# Patient Record
Sex: MALE | Race: WHITE | NOT HISPANIC OR LATINO | Employment: FULL TIME | URBAN - METROPOLITAN AREA
[De-identification: names, ages, dates, MRNs, and addresses within clinical notes are randomized per-mention and may not be internally consistent; named-entity substitution may affect disease eponyms.]

---

## 2017-01-12 RX ORDER — SIMVASTATIN 40 MG/1
TABLET, FILM COATED ORAL
Qty: 30 TABLET | Refills: 11 | Status: ON HOLD | OUTPATIENT
Start: 2017-01-12 | End: 2017-07-31 | Stop reason: HOSPADM

## 2017-01-24 RX ORDER — INSULIN ASPART 100 [IU]/ML
INJECTION, SOLUTION INTRAVENOUS; SUBCUTANEOUS
Qty: 10 VIAL | Refills: 3 | Status: SHIPPED | OUTPATIENT
Start: 2017-01-24 | End: 2017-06-05 | Stop reason: SDUPTHER

## 2017-04-06 ENCOUNTER — LAB VISIT (OUTPATIENT)
Dept: LAB | Facility: HOSPITAL | Age: 57
End: 2017-04-06
Attending: INTERNAL MEDICINE
Payer: COMMERCIAL

## 2017-04-06 ENCOUNTER — OFFICE VISIT (OUTPATIENT)
Dept: ENDOCRINOLOGY | Facility: CLINIC | Age: 57
End: 2017-04-06
Payer: COMMERCIAL

## 2017-04-06 VITALS
DIASTOLIC BLOOD PRESSURE: 70 MMHG | BODY MASS INDEX: 30.08 KG/M2 | SYSTOLIC BLOOD PRESSURE: 126 MMHG | HEIGHT: 70 IN | WEIGHT: 210.13 LBS | HEART RATE: 66 BPM

## 2017-04-06 DIAGNOSIS — E11.59 HYPERTENSION ASSOCIATED WITH DIABETES: ICD-10-CM

## 2017-04-06 DIAGNOSIS — E11.649 HYPOGLYCEMIA ASSOCIATED WITH DIABETES: ICD-10-CM

## 2017-04-06 DIAGNOSIS — H35.00 RETINOPATHY: ICD-10-CM

## 2017-04-06 DIAGNOSIS — E03.9 HYPOTHYROIDISM, UNSPECIFIED TYPE: ICD-10-CM

## 2017-04-06 DIAGNOSIS — E78.5 DYSLIPIDEMIA: ICD-10-CM

## 2017-04-06 DIAGNOSIS — I15.2 HYPERTENSION ASSOCIATED WITH DIABETES: ICD-10-CM

## 2017-04-06 LAB
ALBUMIN SERPL BCP-MCNC: 3.8 G/DL
ALP SERPL-CCNC: 108 U/L
ALT SERPL W/O P-5'-P-CCNC: 17 U/L
ANION GAP SERPL CALC-SCNC: 7 MMOL/L
AST SERPL-CCNC: 18 U/L
BILIRUB SERPL-MCNC: 1.4 MG/DL
BUN SERPL-MCNC: 19 MG/DL
CALCIUM SERPL-MCNC: 9 MG/DL
CHLORIDE SERPL-SCNC: 104 MMOL/L
CO2 SERPL-SCNC: 28 MMOL/L
CREAT SERPL-MCNC: 1 MG/DL
EST. GFR  (AFRICAN AMERICAN): >60 ML/MIN/1.73 M^2
EST. GFR  (NON AFRICAN AMERICAN): >60 ML/MIN/1.73 M^2
ESTIMATED AVG GLUCOSE: 169 MG/DL
GLUCOSE SERPL-MCNC: 67 MG/DL
HBA1C MFR BLD HPLC: 7.5 %
POTASSIUM SERPL-SCNC: 4.4 MMOL/L
PROT SERPL-MCNC: 6.9 G/DL
SODIUM SERPL-SCNC: 139 MMOL/L
T4 FREE SERPL-MCNC: 1 NG/DL
TSH SERPL DL<=0.005 MIU/L-ACNC: 11.98 UIU/ML

## 2017-04-06 PROCEDURE — 99999 PR PBB SHADOW E&M-EST. PATIENT-LVL III: CPT | Mod: PBBFAC,,, | Performed by: NURSE PRACTITIONER

## 2017-04-06 PROCEDURE — 80053 COMPREHEN METABOLIC PANEL: CPT

## 2017-04-06 PROCEDURE — 3045F PR MOST RECENT HEMOGLOBIN A1C LEVEL 7.0-9.0%: CPT | Mod: S$GLB,,, | Performed by: NURSE PRACTITIONER

## 2017-04-06 PROCEDURE — 84439 ASSAY OF FREE THYROXINE: CPT

## 2017-04-06 PROCEDURE — 36415 COLL VENOUS BLD VENIPUNCTURE: CPT

## 2017-04-06 PROCEDURE — 4010F ACE/ARB THERAPY RXD/TAKEN: CPT | Mod: S$GLB,,, | Performed by: NURSE PRACTITIONER

## 2017-04-06 PROCEDURE — 99215 OFFICE O/P EST HI 40 MIN: CPT | Mod: S$GLB,,, | Performed by: NURSE PRACTITIONER

## 2017-04-06 PROCEDURE — 3074F SYST BP LT 130 MM HG: CPT | Mod: S$GLB,,, | Performed by: NURSE PRACTITIONER

## 2017-04-06 PROCEDURE — 84443 ASSAY THYROID STIM HORMONE: CPT

## 2017-04-06 PROCEDURE — 1160F RVW MEDS BY RX/DR IN RCRD: CPT | Mod: S$GLB,,, | Performed by: NURSE PRACTITIONER

## 2017-04-06 PROCEDURE — 83036 HEMOGLOBIN GLYCOSYLATED A1C: CPT

## 2017-04-06 PROCEDURE — 3078F DIAST BP <80 MM HG: CPT | Mod: S$GLB,,, | Performed by: NURSE PRACTITIONER

## 2017-04-06 NOTE — PROGRESS NOTES
"CC: Patient is here for management of Type 1 diabetes and review of medical conditions as listed in visit diagnosis.      HPI: Mr. Owen Radford is a 56 y.o. White male who was diagnosed with Type 1  DM x 25 years ago, last seen by Dr. Sheffield in December 2016.   Pt is being seen by me for the first time.  Has medtronic pump, has used pump x 10 years. Also, uses sensor.  Recently made adjustments to basal settings r/t hypoglycemia in the afternoon.    CURRENT DIABETIC MEDS: novolog w/ insulin pump     Last Podiatry Exam: > 1 year    REVIEW OF SYSTEMS  General: no weakness, fatigue, or weight changes.   Eyes: no double or blurred vision, eye pain, or redness; last eye exam= 2016.   Cardiovascular: no chest pain, palpitations, edema, or murmurs.   Respiratory: no cough or dyspnea.   GI: no heartburn, nausea, or changes in bowel patterns; good appetite.   Skin: no rashes, dryness, itching, or reactions at insulin injection sites.   Neuro: no numbness, tingling, tremors, or vertigo.   Endocrine: no polyuria, polydipsia, polyphagia, heat or cold intolerance.     Vital Signs  /70 (BP Location: Left arm, Patient Position: Sitting, BP Method: Manual)  Pulse 66  Ht 5' 10" (1.778 m)  Wt 95.3 kg (210 lb 1.6 oz)  BMI 30.15 kg/m2    Hemoglobin A1C   Date Value Ref Range Status   11/29/2016 7.8 (H) 4.5 - 6.2 % Final     Comment:     According to ADA guidelines, hemoglobin A1C <7.0% represents  optimal control in non-pregnant diabetic patients.  Different  metrics may apply to specific populations.   Standards of Medical Care in Diabetes - 2016.  For the purpose of screening for the presence of diabetes:  <5.7%     Consistent with the absence of diabetes  5.7-6.4%  Consistent with increasing risk for diabetes   (prediabetes)  >or=6.5%  Consistent with diabetes  Currently no consensus exists for use of hemoglobin A1C  for diagnosis of diabetes for children.     07/26/2016 7.6 (H) 4.5 - 6.2 % Final     Comment:     " According to ADA guidelines, hemoglobin A1C <7.0% represents  optimal control in non-pregnant diabetic patients.  Different  metrics may apply to specific populations.   Standards of Medical Care in Diabetes - 2016.  For the purpose of screening for the presence of diabetes:  <5.7%     Consistent with the absence of diabetes  5.7-6.4%  Consistent with increasing risk for diabetes   (prediabetes)  >or=6.5%  Consistent with diabetes  Currently no consensus exists for use of hemoglobin A1C  for diagnosis of diabetes for children.     05/15/2007 7.6 (H) 4.5 - 6.2 % Final      Lab Results   Component Value Date    CREATININE 1.0 11/29/2016      Lab Results   Component Value Date    TSH 3.120 11/29/2016      Lab Results   Component Value Date    LDLCALC 99.4 07/26/2016        PHYSICAL EXAMINATION  Constitutional: Appears well, no distress  Neck: Supple, trachea midline.   Respiratory: CTA without wheezes, even and unlabored.  Cardiovascular: RRR; no carotid bruits or murmurs.   Lymph: DP pulses  2+ bilaterally; no edema.   Skin: warm and dry; no injection site reactions, no acanthosis nigracans observed.  Neuro:patient alert and cooperative, normal affect.    Diabetes Foot Exam:   defer    Assessment/Plan    1. DM (diabetes mellitus), type 1, uncontrolled with complications -  DM uncontrolled  Awaiting a1c-in process  Reviewed tdd and download.  Continue basal: mn-0630 1 u/hr, 0630 2.45 u/hr, 1300 1.7 u/hr, 1900 1.4 u/hr  Change icr 1:9,  Continue isf 50  Target   iob 4 hours  Bg ac/hs  Counseling >35 mins/interpretation     2. Hypertension associated with diabetes - stable, continue med(s)   3. Hypoglycemia associated with diabetes - reviewed, basal settings recently adjusted (lows during the afternoon)-less now   4. Hypothyroidism, unspecified type -continue levothyroxine 200 mcg -repeat, tsh next time, may not take on empty stomach with water every morning.  Lab Results   Component Value Date    TSH 11.983 (H)  04/06/2017        5. Retinopathy -f/u with ophthalmology/retinal specialist   6. Dyslipidemia -  Lab Results   Component Value Date    LDLCALC 99.4 07/26/2016     At goal. Continue statin        FOLLOW UP  Return in about 3 months (around 7/6/2017).     Orders Placed This Encounter   Procedures    Hemoglobin A1c     Standing Status:   Future     Standing Expiration Date:   6/5/2018

## 2017-04-06 NOTE — PATIENT INSTRUCTIONS
Snacks can be an important part of a balanced, healthy meal plan. They allow you to eat more frequently, feeling full and satisfied throughout the day. Also, they allow you to spread carbohydrates evenly, which may stabilize blood sugars.  Plus, snacks are enjoyable!     The amount of carbohydrate needed at snacks varies. Generally, about 15-30 grams of carbohydrate per snack is recommended.  Below you will find some tasty treats.       0-5 gm carb   Crystal Light   Vitamin Water Zero   Herbal tea, unsweetened   2 tsp peanut butter on celery   1./2 cup sugar-free jell-o   1 sugar-free popsicle   ¼ cup blueberries   8oz Blue Swati unsweetened almond milk   5 baby carrots & celery sticks, cucumbers, bell peppers dipped in ¼ cup salsa, 2Tbsp light ranch dressing or 2Tbsp plain Greek yogurt   10 Goldfish crackers   ½ oz low-fat cheese or string cheese   1 closed handful of nuts, unsalted   1 Tbsp of sunflower seeds, unsalted   1 cup Smart Pop popcorn   1 whole grain brown rice cake        15 gm carb   1 small piece of fruit or ½ banana or 1/2 cup lite canned fruit   3 melodie cracker squares   3 cups Smart Pop popcorn, top spray butter, Rudd lite salt or cinnamon and Truvia   5 Vanilla Wafers   ½ cup low fat, no added sugar ice cream or frozen yogurt (Blue bell, Blue Bunny, Weight Watchers, Skinny Cow)   ½ turkey, ham, or chicken sandwich   ½ c fruit with ½ c Cottage cheese   4-6 unsalted wheat crackers with 1 oz low fat cheese or 1 tbsp peanut butter    30-45 goldfish crackers (depending on flavor)    7-8 Scientologist mini brown rice cakes (caramel, apple cinnamon, chocolate)    12 Scientologist mini brown rice cakes (cheddar, bbq, ranch)    1/3 cup hummus dip with raw veg   1/2 whole wheat divya, 1Tbsp hummus   Mini Pizza (1/2 whole wheat English muffin, low-fat  cheese, tomato sauce)   100 calorie snack pack (Oreo, Chips Ahoy, Ritz Mix, Baked Cheetos)   4-6 oz. light or Greek Style yogurt  (Rafael Solomon, [place], [facility])   ½ cup sugar-free pudding     6 in. wheat tortilla or divya oven toasted chips (topped with spray butter flavoring, cinnamon, Truvia OR spray butter, garlic powder, chili powder)    18 BBQ Popchips (available at Target, Whole Foods, Fresh Market)                   Diabetes Support Group Meetings    Date Topics   February 9 Health Promotion/Nutrition   March 9 Taking Care of Your Kidneys   April 13 Taking Care of Your Feet   May 11 Ease Your Mind with Diabetes   June 8 Hurricane and Emergency Preparedness   July 13 Family & Caregiver Support for Diabetes   *August 17  Taking Care of Your Eyes   September 14 Devices & Technology   October 12 Recipes & Treats   November 9 Getting Pumped Up for Diabetes   December 14 Year-End Close Out            Meetings are held in the [facility] (A) of the [facility] located at [address] [place] [address]. Please call [phone] for additional information.    Free service, offered every 2nd Thursday of every month, except in August! Family members and/or friends are welcome as well!  Support group is for patients with type 1 or type 2 diabetes.    From 3:30p to 4:30p

## 2017-05-05 ENCOUNTER — PATIENT MESSAGE (OUTPATIENT)
Dept: ENDOCRINOLOGY | Facility: CLINIC | Age: 57
End: 2017-05-05

## 2017-05-07 DIAGNOSIS — Z12.11 ENCOUNTER FOR SCREENING COLONOSCOPY: Primary | ICD-10-CM

## 2017-05-15 ENCOUNTER — PATIENT MESSAGE (OUTPATIENT)
Dept: ENDOCRINOLOGY | Facility: CLINIC | Age: 57
End: 2017-05-15

## 2017-06-05 RX ORDER — INSULIN ASPART 100 [IU]/ML
INJECTION, SOLUTION INTRAVENOUS; SUBCUTANEOUS
Qty: 3 VIAL | Refills: 6 | Status: SHIPPED | OUTPATIENT
Start: 2017-06-05 | End: 2018-05-08 | Stop reason: SDUPTHER

## 2017-06-22 ENCOUNTER — OFFICE VISIT (OUTPATIENT)
Dept: GASTROENTEROLOGY | Facility: CLINIC | Age: 57
End: 2017-06-22
Payer: COMMERCIAL

## 2017-06-22 ENCOUNTER — TELEPHONE (OUTPATIENT)
Dept: ENDOSCOPY | Facility: HOSPITAL | Age: 57
End: 2017-06-22

## 2017-06-22 VITALS
HEART RATE: 67 BPM | WEIGHT: 205.94 LBS | DIASTOLIC BLOOD PRESSURE: 70 MMHG | BODY MASS INDEX: 29.48 KG/M2 | HEIGHT: 70 IN | SYSTOLIC BLOOD PRESSURE: 121 MMHG

## 2017-06-22 DIAGNOSIS — Z12.11 COLON CANCER SCREENING: Primary | ICD-10-CM

## 2017-06-22 DIAGNOSIS — Z12.11 SPECIAL SCREENING FOR MALIGNANT NEOPLASMS, COLON: Primary | ICD-10-CM

## 2017-06-22 PROCEDURE — 99999 PR PBB SHADOW E&M-EST. PATIENT-LVL III: CPT | Mod: PBBFAC,,, | Performed by: PHYSICIAN ASSISTANT

## 2017-06-22 PROCEDURE — 99499 UNLISTED E&M SERVICE: CPT | Mod: S$GLB,,, | Performed by: PHYSICIAN ASSISTANT

## 2017-06-22 RX ORDER — POLYETHYLENE GLYCOL 3350, SODIUM SULFATE ANHYDROUS, SODIUM BICARBONATE, SODIUM CHLORIDE, POTASSIUM CHLORIDE 236; 22.74; 6.74; 5.86; 2.97 G/4L; G/4L; G/4L; G/4L; G/4L
4 POWDER, FOR SOLUTION ORAL ONCE
Qty: 4000 ML | Refills: 0 | Status: SHIPPED | OUTPATIENT
Start: 2017-06-22 | End: 2017-06-22

## 2017-06-22 NOTE — PROGRESS NOTES
[facility] Consultation Note    Reason for Consult:  The encounter diagnosis was Colon cancer screening.    PCP:   Ronald Segura   [address] / [place] [address]    Referring MD:  Nikki Sheffield Md  [address] [place] [address]    HPI:  This is a 56 y.o. male here to schedule a screening colonoscopy    Today he has no complaints. He denies abdominal pain, nausea, vomiting, GERD, dysphagia, constipation, diarrhea, BRBPR, or melena.    No prior colonoscopy    ROS:  Constitutional: No fevers, chills, No weight loss  ENT: No allergies  CV: No chest pain  Pulm: No cough, No shortness of breath  Ophtho: No vision changes  GI: see HPI  Derm: No rash  Heme: No lymphadenopathy, No bruising  MSK: No arthritis  : No dysuria, No hematuria  Endo: No hot or cold intolerance  Neuro: No syncope, No seizure  Psych: No anxiety, No depression    Medical History:  has a past medical history of Diabetes mellitus; Fatigue; and Sleep apnea.    Surgical History:  has a past surgical history that includes Knee arthroscopy w/ meniscal repair.    Family History: family history includes Cancer in his father..     Social History:  reports that he quit smoking about 8 years ago. He does not have any smokeless tobacco history on file. He reports that he drinks alcohol. He reports that he does not use drugs.    Review of patient's allergies indicates:  No Known Allergies    Current Outpatient Prescriptions on File Prior to Visit   Medication Sig Dispense Refill    atorvastatin (LIPITOR) 20 MG tablet Take 20 mg by mouth once daily.        insulin aspart (NOVOLOG) 100 unit/mL injection SUBCUTANEOUS 90u q day per insulin pump 3 vial 6    levothyroxine (SYNTHROID) 200 MCG tablet Take 200 mcg by mouth once daily.      lisinopril 10 MG tablet take 1 tablet by mouth once daily 30 tablet 11    simvastatin (ZOCOR) 40 MG tablet take 1 tablet by mouth once daily 30 tablet 11     No current  "facility-administered medications on file prior to visit.          Objective Findings:    Vital Signs:  /70   Pulse 67   Ht 5' 10" (1.778 m)   Wt 93.4 kg (205 lb 14.6 oz)   BMI 29.54 kg/m²   Body mass index is 29.54 kg/m².    Physical Exam:  General Appearance: Well appearing in no acute distress  Head:   Normocephalic, without obvious abnormality  Eyes:    No scleral icterus  ENT: Neck supple, Lips, mucosa, and tongue normal  Lungs: CTA bilaterally in anterior and posterior fields, no wheezes, no crackles.  Heart:  Regular rate and rhythm, S1, S2 normal, no murmurs heard  Abdomen: Soft, non tender, non distended with positive bowel sounds in all four quadrants.   Extremities: no edema  Skin: No rash  Neurologic: AAO x 3      Labs:  Lab Results   Component Value Date    WBC 10.40 08/24/2011    HGB 14.9 08/24/2011    HCT 43.6 08/24/2011     08/24/2011    CHOL 159 07/26/2016    TRIG 68 07/26/2016    HDL 46 07/26/2016    ALT 17 04/06/2017    AST 18 04/06/2017     04/06/2017    K 4.4 04/06/2017     04/06/2017    CREATININE 1.0 04/06/2017    BUN 19 04/06/2017    CO2 28 04/06/2017    TSH 11.983 (H) 04/06/2017    HGBA1C 7.5 (H) 04/06/2017       Imaging:    Endoscopy:    No prior colonoscopy  Assessment:  1. Colon cancer screening      Recommendations:  1. Schedule colonoscopy to rule out malignancies      No Follow-up on file.      Order summary:  Orders Placed This Encounter    Case request GI: COLONOSCOPY         Thank you so much for allowing me to participate in the care of Owen Bradly Prabhakar Bullock PA-C        "

## 2017-06-22 NOTE — PATIENT INSTRUCTIONS
HIGH FIBER KEY POINTS:  1. Goal of 20-25 grams of fiber each day for women, 30-35 grams each day for men. Slowly build up to this goal as you may experience gas and bloating at first.  2. Take a fiber supplement to help reach this goal: Metamucil, Citrucel, Fibercon, Konsyl, or Psyllium  3. For Constipation: Finely ground psyllium husk (with or without flavoring or                                              Additives)   - To start: 1 teaspoon once a day in the morning with 8 oz of liquid    followed by an additional 8 ounce glass of water   - After a week: add a second teaspoon in the middle of the day   - After two weeks: add a third teaspoon at bedtime   - Follow each dose with another glass of water. Can add a splash of juice   or lemonade for taste.  3. Drink 6-8 glasses of water a day.  4. Try to do plant fiber (fruits and vegetables) over processed fibers (cereals and breads)     HIGH FIBER DIET  Fiber is present in all fruits, vegetables, cereals and grains. Fiber passes through the body undigested. A high fiber diet helps food move through the intestinal tract. The added bulk is helpful in preventing constipation. In persons with diverticulosis it serves to clean out the pouches along the colon wall while preventing new ones from forming. A high fiber diet may reduce the risk of colon cancer, decreases blood cholesterol and prevents high blood sugar in diabetics.    The foods listed below are high in fiber and should be included in your diet. If you are not used to high fiber foods, start with 1 or 2 foods from this list. Every 3-4 days add a new one to your diet until you are eating 4 high fiber foods per day. This should give you 20-35 Gm of fiber/day. It is also important to drink a lot of water when you are on this diet (6-8 glasses a day). Water causes the fiber to swell and increases the benefit.  Add Fiber to Your Diet   Adding fiber to your diet can help relieve constipation by making stools softer  and easier to pass. To increase your fiber intake, your doctor may recommend a bulking agent, such as psyllium. This is a high-fiber supplement available at most grocery and drugstores. Eating more fiber-rich foods will also help. There are two types of fiber:   Insoluble fiber is the main ingredient in bulking agents. Its also found in foods such as wheat bran, whole-grain breads, fresh fruits, and vegetables.   Soluble fiber is found in foods such as oat bran. Although soluble fiber is good for you, it may not ease constipation as much as foods high in insoluble fiber.    FOODS HIGH IN DIETARY FIBER:  BREADS: Made with 100% whole wheat flour; Tristen, wheat or rye crackers; tortillas, bran muffins. Whole grains, such as wheat bran, corn bran, and brown rice.  CEREALS: Whole grain cereal with bran (Chex, Raisin Bran, Corn Bran), oatmeal, rolled oats, granola, wheat flakes, brown rice  NUTS: Any nuts  FRUITS: All fresh fruits along with edible skins, (bananas, citrus fruit, mangoes, pears, prunes, raisins, apples, pineapple, apricot, melon, jams and marmalades), fruit juices (especially prune juice)  VEGETABLES: All types, preferably raw or lightly cooked: especially, celery, eggplant, potatoes,spinach, broccoli, brussel sprouts, winter squash, carrots, cauliflower, soybeans, lentils, fresh and dried beans of all kinds  OTHER: Popcorn, any spices.   Nuts and legumes, especially peanuts, lentils, and kidney beans.  Easy Ways to Add Fiber   The tips below offer some simple ways to add more high-fiber foods to your meals.   Start your day with a high-fiber breakfast. Eat a wheat bran cereal along with a sliced banana. Or, try peanut butter on whole-wheat toast.   Eat carrot sticks for snacks. Theyre easy to prepare, taste great, and are low in calories.   Use whole-grain breads instead of white bread for sandwiches.   Eat fruits for treats. Try an apple and some raisins instead of a candy bar.  Vegetables  Artichoke,  cooked 10.3  Asparagus - 2.8  Beans - 2  Broccoli, boiled 1 cup - 5.1  Gordo sprouts, cooked 1 cup - 4.1  Carrots - boiled 2.5, raw 4  Celery - 1  Corn - 4  Corn on the Cob - 5.9  Lettuce - 1  Peas (canned) - 4  Peas (dried) - 7.9  Green peas (cooked) - 8.8  Potato, with skin, baked - 3.0  Spinach - 4  Sweet potato - 3.4  Turnip greens, boiled 1 cup - 5.0  Sweet corn, cooked  1 cup  4.0  Tomato paste -1/4 cup -2.7  Yam - 2.7  Legumes, Nuts, and Seeds  Split peas, cooked  1 cup  16.3  Lentils, cooked 1 cup 15.6  Black beans, cooked 1 cup 15.0  Black eyed peas (1/2 cup) 4.2  Chick peas (1/2 cup) 5  Lima beans, cooked 1 cup  13.2  Baked beans, vegetarian, canned, cooked 1 cup 10.4  Sunflower seed kernels 1/4 cup 3.9  Almonds 1 ounce (23 nuts)  3.5  Pistachio nuts 1 ounce (49 nuts) 2.9  Pecans 1 ounce (19 halves) 2.7  Beans (lima,kidney,baked) - 10 (1/2 cup)  Refried beans -12 (1cup)  Lentils - 8  Soybeans (1/2 cup) 5  Fruit  Raspberries  1 cup  8.0  Pear, with skin - 5.5  Apple, with skin 4.4 (Juice = 0)  Banana - 3.1  Orange - 3.1  Strawberries (halves) 1 cup - 3.0  Figs, dried 2 medium - 1.6  Raisins 1 ounce (60 raisins) -1.0  Grapefruit - 1.4  Peach - 2  Kiwi - 5  Aron - 3.7  Pineapple - 2  Cereal, Grains, and Pasta  Spaghetti, whole-wheat, cooked 1 cup 6.3  Barley, pearled, cooked 1 cup 6.0  Bran flakes 3/4 cup 5.3  Oat bran muffin 1 medium 5.2  Oatmeal, instant, cooked 1 cup 4.0  Popcorn, air-popped 3 cups 3.5  Brown rice, cooked  1 cup  3.5  Bread, rye 1 slice 1.9, pumpernickel - 2.1  Bagel - 1.6  Bread, whole-wheat or multigrain  1 slice 1.9  Fiber One - 14  100% Bran - 13  Raisin Bran - 3.5  All Bran Extra Fiber - 13

## 2017-06-22 NOTE — LETTER
June 22, 2017      Nikki Sheffield MD  [address]  [place] [address]           [facility]  [address]  [place] [address]  Phone: [phone]  Fax: [phone]          Patient: Owen Radford   MR Number: [id number]   YOB: 1960   Date of Visit: 6/22/2017       Dear Dr. Nikki Sheffield:    Thank you for referring Owen Radford to me for evaluation. Attached you will find relevant portions of my assessment and plan of care.    If you have questions, please do not hesitate to call me. I look forward to following Owen Radford along with you.    Sincerely,    Weston Bullock PA-C    Enclosure  CC:  No Recipients    If you would like to receive this communication electronically, please contact [email or web address] or [phone] to request more information on [facility] [employer] access.    For providers and/or their staff who would like to refer a patient to [facility], please contact us through our one-stop-shop provider referral line, [facility], at [phone].    If you feel you have received this communication in error or would no longer like to receive these types of communications, please e-mail [email or web address]

## 2017-07-31 ENCOUNTER — HOSPITAL ENCOUNTER (OUTPATIENT)
Facility: HOSPITAL | Age: 57
Discharge: HOME | End: 2017-07-31
Attending: INTERNAL MEDICINE | Admitting: INTERNAL MEDICINE
Payer: COMMERCIAL

## 2017-07-31 ENCOUNTER — SURGERY (OUTPATIENT)
Age: 57
End: 2017-07-31

## 2017-07-31 ENCOUNTER — ANESTHESIA (OUTPATIENT)
Dept: ENDOSCOPY | Facility: HOSPITAL | Age: 57
End: 2017-07-31
Payer: COMMERCIAL

## 2017-07-31 ENCOUNTER — ANESTHESIA EVENT (OUTPATIENT)
Dept: ENDOSCOPY | Facility: HOSPITAL | Age: 57
End: 2017-07-31
Payer: COMMERCIAL

## 2017-07-31 VITALS
SYSTOLIC BLOOD PRESSURE: 145 MMHG | BODY MASS INDEX: 27.92 KG/M2 | HEIGHT: 70 IN | DIASTOLIC BLOOD PRESSURE: 85 MMHG | RESPIRATION RATE: 18 BRPM | WEIGHT: 195 LBS | TEMPERATURE: 98 F | HEART RATE: 63 BPM | OXYGEN SATURATION: 95 %

## 2017-07-31 DIAGNOSIS — E11.649 HYPOGLYCEMIA ASSOCIATED WITH DIABETES: Primary | ICD-10-CM

## 2017-07-31 DIAGNOSIS — Z12.11 SPECIAL SCREENING FOR MALIGNANT NEOPLASMS, COLON: ICD-10-CM

## 2017-07-31 LAB — POCT GLUCOSE: 169 MG/DL (ref 70–110)

## 2017-07-31 PROCEDURE — 63600175 PHARM REV CODE 636 W HCPCS: Performed by: NURSE ANESTHETIST, CERTIFIED REGISTERED

## 2017-07-31 PROCEDURE — G0121 COLON CA SCRN NOT HI RSK IND: HCPCS | Performed by: INTERNAL MEDICINE

## 2017-07-31 PROCEDURE — D9220A PRA ANESTHESIA: Mod: 33,,, | Performed by: ANESTHESIOLOGY

## 2017-07-31 PROCEDURE — 82962 GLUCOSE BLOOD TEST: CPT | Performed by: INTERNAL MEDICINE

## 2017-07-31 PROCEDURE — 25000003 PHARM REV CODE 250: Performed by: INTERNAL MEDICINE

## 2017-07-31 PROCEDURE — G0121 COLON CA SCRN NOT HI RSK IND: HCPCS | Mod: ,,, | Performed by: INTERNAL MEDICINE

## 2017-07-31 PROCEDURE — 37000008 HC ANESTHESIA 1ST 15 MINUTES: Performed by: INTERNAL MEDICINE

## 2017-07-31 PROCEDURE — 37000009 HC ANESTHESIA EA ADD 15 MINS: Performed by: INTERNAL MEDICINE

## 2017-07-31 RX ORDER — PROPOFOL 10 MG/ML
VIAL (ML) INTRAVENOUS
Status: DISCONTINUED | OUTPATIENT
Start: 2017-07-31 | End: 2017-07-31

## 2017-07-31 RX ORDER — PROPOFOL 10 MG/ML
VIAL (ML) INTRAVENOUS CONTINUOUS PRN
Status: DISCONTINUED | OUTPATIENT
Start: 2017-07-31 | End: 2017-07-31

## 2017-07-31 RX ORDER — LIDOCAINE HCL/PF 100 MG/5ML
SYRINGE (ML) INTRAVENOUS
Status: DISCONTINUED | OUTPATIENT
Start: 2017-07-31 | End: 2017-07-31

## 2017-07-31 RX ORDER — SODIUM CHLORIDE 9 MG/ML
INJECTION, SOLUTION INTRAVENOUS CONTINUOUS
Status: DISCONTINUED | OUTPATIENT
Start: 2017-07-31 | End: 2017-07-31 | Stop reason: HOSPADM

## 2017-07-31 RX ADMIN — PROPOFOL 80 MG: 10 INJECTION, EMULSION INTRAVENOUS at 07:07

## 2017-07-31 RX ADMIN — PROPOFOL 200 MCG/KG/MIN: 10 INJECTION, EMULSION INTRAVENOUS at 07:07

## 2017-07-31 RX ADMIN — LIDOCAINE HYDROCHLORIDE 100 MG: 20 INJECTION, SOLUTION INTRAVENOUS at 07:07

## 2017-07-31 RX ADMIN — SODIUM CHLORIDE: 0.9 INJECTION, SOLUTION INTRAVENOUS at 07:07

## 2017-07-31 NOTE — PATIENT INSTRUCTIONS
Discharge Summary/Instructions after an Endoscopic Procedure  Patient Name: Owen Radford  Patient MRN: [id number]  Patient YOB: 1960 Monday, July 31, 2017  Bacilio Thakkar MD  RESTRICTIONS ON ACTIVITY:  - DO NOT drive a car, operate machinery, make legal/financial decisions, or   drink alcohol until the day after the procedure.    - The following day: return to full activity including work, except no heavy   lifting, straining or running for 3 days if polyps were removed.  - Diet: Eat and drink normally unless instructed otherwise.  TREATMENT FOR COMMON SIDE EFFECTS:  - Mild abdominal pain, bloating or excessive gas: rest, eat lightly and use   a heating pad.  - Sore Throat - treat with throat lozenges. Gargle with warm salt water.  SYMPTOMS TO WATCH FOR AND REPORT TO YOUR PHYSICIAN:  1. Severe abdominal pain or bloating.  2. Pain in chest.  3. Chills or fever occurring within 24 hours after a procedure.  4. A large amount of rectal bleeding, which would show as bright red,   maroon, or black stools. (A small amount of blood from the rectum is not   serious, especially if hemorrhoids are present.)  5. Because air was used during the procedure, expelling large amounts of air   from your rectum or belching is normal.  6. If a bowel prep was taken, you may not have a bowel movement for 1-3   days.  This is normal.  7. Go directly to the emergency room if you notice any of the following:   Chills and/or fever over 101 F   Persistent vomiting   Severe abdominal pain, other than gas cramps   Severe chest pain   Black, tarry stools   Any bleeding - exceeding one tablespoon  Your doctor recommends these additional instructions:  If any biopsies were performed, my office will call you in 5 to 6 business   days with any results.  You have a contact number available for emergencies.  The signs and symptoms   of potential delayed complications were discussed with you.  You may return   to normal activities tomorrow.   Written discharge instructions were   provided to you.   You are being discharged to home.   Your physician has recommended a repeat colonoscopy in 10 years for   screening purposes.   The findings and recommendations were discussed with your designated   responsible adult.  For questions, problems or results please call your physician - Bacilio Thakkar MD at Work:  [phone].  [facility], EMERGENCY ROOM PHONE NUMBER: [phone]  IF A COMPLICATION OR EMERGENCY SITUATION ARISES AND YOU ARE UNABLE TO REACH   YOUR PHYSICIAN - GO TO THE EMERGENCY ROOM.  Bacilio Thakkar MD  7/31/2017 7:47:17 AM  This report has been verified and signed electronically.

## 2017-07-31 NOTE — H&P
Short Stay Endoscopy History and Physical    PCP - Ronald Segura MD    Procedure - Colonoscopy  ASA - per anesthesia  Mallampati - per anesthesia  History of Anesthesia problems - no  Family history Anesthesia problems - no   Plan of anesthesia - General    HPI:  This is a 57 y.o. male here for evaluation of : asymptomatic screening exam      ROS:  Constitutional: No fevers, chills, No weight loss  CV: No chest pain  Pulm: No cough, No shortness of breath  GI: see HPI  Derm: No rash    Medical History:  has a past medical history of Diabetes mellitus; Fatigue; Hypertension; and Sleep apnea.    Surgical History:  has a past surgical history that includes Knee arthroscopy w/ meniscal repair.    Family History: family history includes Cancer in his father.. Otherwise no colon cancer, inflammatory bowel disease, or GI malignancies.    Social History:  reports that he quit smoking about 8 years ago. He has never used smokeless tobacco. He reports that he drinks alcohol. He reports that he does not use drugs.    Review of patient's allergies indicates:  No Known Allergies    Medications:   Prescriptions Prior to Admission   Medication Sig Dispense Refill Last Dose    atorvastatin (LIPITOR) 20 MG tablet Take 20 mg by mouth once daily.     7/30/2017 at Unknown time    insulin aspart (NOVOLOG) 100 unit/mL injection SUBCUTANEOUS 90u q day per insulin pump 3 vial 6 7/31/2017 at Unknown time    levothyroxine (SYNTHROID) 200 MCG tablet Take 200 mcg by mouth once daily.   7/30/2017 at Unknown time    lisinopril 10 MG tablet take 1 tablet by mouth once daily 30 tablet 11 7/30/2017 at Unknown time    simvastatin (ZOCOR) 40 MG tablet take 1 tablet by mouth once daily 30 tablet 11 Taking         Physical Exam:    Vital Signs:   Vitals:    07/31/17 0718   BP: (!) 141/73   Pulse: (!) 58   Resp: 18   Temp: 97.9 °F (36.6 °C)       General Appearance: Well appearing in no acute distress  Eyes:    No scleral icterus  ENT: Neck  supple, Lips, mucosa, and tongue normal; teeth and gums normal  Lungs: CTA bilaterally  Heart:  S1, S2 normal, no murmurs heard  Abdomen: Soft, non tender, non distended with positive bowel sounds. No hepatosplenomegaly, ascites, or mass.  Extremities: 2+ pulses, no clubbing, cyanosis or edema  Skin: No rash      Labs:  Lab Results   Component Value Date    WBC 10.40 08/24/2011    HGB 14.9 08/24/2011    HCT 43.6 08/24/2011     08/24/2011    CHOL 159 07/26/2016    TRIG 68 07/26/2016    HDL 46 07/26/2016    ALT 17 04/06/2017    AST 18 04/06/2017     04/06/2017    K 4.4 04/06/2017     04/06/2017    CREATININE 1.0 04/06/2017    BUN 19 04/06/2017    CO2 28 04/06/2017    TSH 11.983 (H) 04/06/2017    HGBA1C 7.5 (H) 04/06/2017       I have explained the risks and benefits of endoscopy procedures to the patient including but not limited to bleeding, perforation, infection, and death.      Bacilio Thakkar MD

## 2017-07-31 NOTE — ANESTHESIA POSTPROCEDURE EVALUATION
"Anesthesia Post Evaluation    Patient: Owen Radford    Procedure(s) Performed: Procedure(s) (LRB):  COLONOSCOPY (N/A)    Final Anesthesia Type: general  Patient location during evaluation: GI PACU  Patient participation: Yes- Able to Participate  Level of consciousness: awake and alert, oriented and awake  Post-procedure vital signs: reviewed and stable  Pain management: adequate  Airway patency: patent  PONV status at discharge: No PONV  Anesthetic complications: no      Cardiovascular status: blood pressure returned to baseline and hemodynamically stable  Respiratory status: unassisted, spontaneous ventilation and room air  Hydration status: euvolemic  Follow-up not needed.        Visit Vitals  BP (!) 145/85 (BP Location: Left arm, Patient Position: Lying, BP Method: Automatic)   Pulse 63   Temp 36.7 °C (98 °F) (Oral)   Resp 18   Ht 5' 10" (1.778 m)   Wt 88.5 kg (195 lb)   SpO2 95%   BMI 27.98 kg/m²       Pain/Alejandra Score: Pain Assessment Performed: Yes (7/31/2017  8:09 AM)  Presence of Pain: denies (7/31/2017  8:09 AM)  Alejandra Score: 10 (7/31/2017  7:52 AM)      "

## 2017-07-31 NOTE — TRANSFER OF CARE
"Anesthesia Transfer of Care Note    Patient: Owen Radford    Procedure(s) Performed: Procedure(s) (LRB):  COLONOSCOPY (N/A)    Patient location: PACU    Anesthesia Type: general    Transport from OR: Transported from OR on room air with adequate spontaneous ventilation    Post pain: adequate analgesia    Post assessment: no apparent anesthetic complications    Post vital signs: stable    Level of consciousness: sedated    Nausea/Vomiting: no nausea/vomiting    Complications: none    Transfer of care protocol was followed      Last vitals:   Visit Vitals  BP (!) 108/57 (BP Location: Left arm, Patient Position: Lying, BP Method: Automatic)   Pulse 70   Temp 36.7 °C (98 °F) (Oral)   Resp 16   Ht 5' 10" (1.778 m)   Wt 88.5 kg (195 lb)   SpO2 (!) 94%   BMI 27.98 kg/m²     "

## 2017-07-31 NOTE — ANESTHESIA PREPROCEDURE EVALUATION
07/31/2017  Owen Radford is a 57 y.o., male.    Anesthesia Evaluation    I have reviewed the Patient Summary Reports.    I have reviewed the Nursing Notes.      Review of Systems  Anesthesia Hx:  No problems with previous Anesthesia    Cardiovascular:   Hypertension    Pulmonary:   Sleep Apnea    Endocrine:   Diabetes Hypothyroidism        Physical Exam  General:  Well nourished    Airway/Jaw/Neck:  Airway Findings: Mouth Opening: Normal Tongue: Normal  Mallampati: II  TM Distance: Normal, at least 6 cm  Jaw/Neck Findings:  Neck ROM: Normal ROM     Eyes/Ears/Nose:  Eyes/Ears/Nose Findings:    Dental:  Dental Findings:   Chest/Lungs:  Chest/Lungs Findings: Normal Respiratory Rate     Heart/Vascular:  Heart Findings: Rate: Normal  Rhythm: Regular Rhythm        Mental Status:  Mental Status Findings:  Cooperative, Alert and Oriented         Anesthesia Plan  Type of Anesthesia, risks & benefits discussed:  Anesthesia Type:  general  Patient's Preference: general  Intra-op Monitoring Plan: standard ASA monitors  Intra-op Monitoring Plan Comments:   Post Op Pain Control Plan:   Post Op Pain Control Plan Comments:   Induction:   IV  Beta Blocker:  Patient is not currently on a Beta-Blocker (No further documentation required).       Informed Consent: Patient understands risks and agrees with Anesthesia plan.  Questions answered. Anesthesia consent signed with patient.  ASA Score: 2     Day of Surgery Review of History & Physical:    H&P update referred to the surgeon.         Ready For Surgery From Anesthesia Perspective.

## 2017-08-02 ENCOUNTER — TELEPHONE (OUTPATIENT)
Dept: ENDOCRINOLOGY | Facility: CLINIC | Age: 57
End: 2017-08-02

## 2017-08-02 NOTE — TELEPHONE ENCOUNTER
----- Message from Ann Solomon sent at 8/2/2017 12:46 PM CDT -----  Contact: Bringg   [phone]    Dipp   -   Fax was sent on July 13 in regards to  Medical necessity for insulin pump , requesting a call back for updates   Fax number [phone]  Thanks,

## 2017-08-03 ENCOUNTER — TELEPHONE (OUTPATIENT)
Dept: DIABETES | Facility: CLINIC | Age: 57
End: 2017-08-03

## 2017-08-07 ENCOUNTER — TELEPHONE (OUTPATIENT)
Dept: ENDOSCOPY | Facility: HOSPITAL | Age: 57
End: 2017-08-07

## 2017-08-10 ENCOUNTER — TELEPHONE (OUTPATIENT)
Dept: DIABETES | Facility: CLINIC | Age: 57
End: 2017-08-10

## 2017-08-15 ENCOUNTER — TELEPHONE (OUTPATIENT)
Dept: ENDOCRINOLOGY | Facility: CLINIC | Age: 57
End: 2017-08-15

## 2017-08-15 NOTE — TELEPHONE ENCOUNTER
Called [employer] and spoke with indira.  Indira explains that, all they are waiting for is a signed and dated form from JOHN Singh NP for Insulin pump supplies that patient needs.  Original fax was sent July 13 th, but they never received anything back.  She is re faxing a new form with all information prefilled and, all that is needed is JOHN Singh NP's signature and date on it and faxed back to them- so the patient can be taken cared of.

## 2017-08-17 ENCOUNTER — TELEPHONE (OUTPATIENT)
Dept: ENDOCRINOLOGY | Facility: CLINIC | Age: 57
End: 2017-08-17

## 2017-08-17 NOTE — TELEPHONE ENCOUNTER
----- Message from Radha Dinero sent at 8/17/2017 11:09 AM CDT -----  Contact: Cassandra rizo/ [employer] [phone]  or fax no: [phone]  Needs you to fax the recent office notes to :   [phone]. For this pt.. As per caller.

## 2017-11-09 ENCOUNTER — CLINICAL SUPPORT (OUTPATIENT)
Dept: DIABETES | Facility: CLINIC | Age: 57
End: 2017-11-09
Payer: COMMERCIAL

## 2017-11-09 PROCEDURE — G0108 DIAB MANAGE TRN  PER INDIV: HCPCS | Mod: S$GLB,,, | Performed by: DIETITIAN, REGISTERED

## 2017-11-09 NOTE — PROGRESS NOTES
Diabetes Education  Author: Vicenta Aguilar RD  Date: 11/9/2017    Diabetes Education Visit  Diabetes Education Record Assessment/Progress: Comprehensive/Group    Diabetes Type  Diabetes Type : Type I         Nutrition  Meal Planning: 3 meals per day    Monitoring   Self Monitoring : wears Enlite sensor and calibrating 2-3 times daily  Blood Glucose Logs: Yes (see pump download)         Current Diabetes Treatment   Current Treatment: Insulin pump (Self-upgraded from 530G to 630G pump about 1 week ago. See download for current pump settings. He also reports self-adjusting basal settings. Has not been using bolus wizard - noted all manual boluses and often has back-to-back boluses (risk of stacking))    Social History  Preferred Learning Method: Face to Face, Reading Materials, Hands On  Primary Support: Self    PHQ-2 Total Score: 0       DDS-2 Score  ( > 3 = SIGNIFICANT DISTRESS): 1                   Barriers to Change  Barriers to Change: None  Learning Challenges : None    Readiness to Learn   Readiness to Learn : Acceptance    Cultural Influences  Cultural Influences: No    Diabetes Education Assessment/Progress    Nutrition (Incorporating nutritional management into one's lifestyle): Discussion, Individual Session, Needs Review, Instructed, Comprehends Key Points (Not counting carbohydrates - shows basic understanding of sources of CHO and label reading but does not know portion size of foods for ~15g CHO without label. Provided review of portion sizes for 15g CHO and estimating portions with hand size. )    Medications (states correct name, dose, onset, peak, duration, side effects & timing of meds): Discussion, Demonstration, Return Demonstration, Needs Review, Individual Session, Instructed, Written Materials Provided, Comprehends Key Points (He arrived with new 630G pump already set up and with infusion set connected to right side. Downloaded both 530G pump and 630G pump and compared download. All settings  in 630G pump have been entered properly. However, he has self-adjusted basal settings including a large jump from 1 u/hr mn-6:30 up to 2.4 u/hr at 6:30am-1:30pm. Download reveals pump is suspending on low most days around 12:30pm. Advised to decrease 6:30-13:30 basal by at least 10% to 2.1u/hr to prevent. Reviewed download with Ninfa Singh NP in person and she agreed with change.     He is also not CHO counting or using bolus wizard. He is manually bolusing, seems to be guessing at dosages, and often bolusing back to back to get BG down after meals. Risk for stacking insulin. Discussed with pt benefits of using bolus wizard, especially to have active insulin on board tracked by pump. Advised to return to CHO counting, entering carbs and using bolus wizard before each meal. Explained if accurately CHO counting and BG remaining uncontrolled after meals, adjustment to ICR would be needed.     Pt appears to go 6 months between endocrine visits and self-adjusting settings often. Encouraged setting up personal [employer] account at home and sending his username and password to link our clinic account - explained this will enable NP to review his download and rec'd adjustments if between visits.)    Monitoring (monitoring blood glucose/other parameters & using results): Discussion, Needs Review, Individual Session, Instructed, Written Materials Provided, Comprehends Key Points (Reviewed must calibrate at least Q12 hrs but recommended 3-4 times daily for improved accuracy with Enlite sensor. Reviewed proper calibration timing, avoid times when directional arrows are present, icons on pump screen, and sensor placement. He does no)    Acute Complications (preventing, detecting, and treating acute complications): Discussion, Individual Session, Instructed, Demonstrates Understanding/Competency (verbalizes/demonstrates) (Reviewed troubleshooting hypoglycemia and hyperglycemia with insulin pump therapy. Reports sometimes drops  low with increased activity - advised to set temp basals 30min-1hr prior to activity to help prevent hypos. )    Goals  Patient has selected/evaluated goals during today's session: Yes, selected  Medications: Set (Will use bolus wizard on insulin pump, enter grams CHO and BG before eating each meal. )  Start Date: 11/09/17  Target Date: 02/09/18         Diabetes Care Plan/Intervention  Education Plan/Intervention: Endocrine Provider Visit Set Up (Declined additional DE follow-up at this time. Provided contact info. Last endocrine NP visit in April 2017 - set up endo NP appt for Dec. )         Education Units of Time   Time Spent: 90 min      Health Maintenance Topics with due status: Not Due       Topic Last Completion Date    Foot Exam 04/06/2017    Colonoscopy 07/31/2017     Health Maintenance Due   Topic Date Due    Hepatitis C Screening  1960    TETANUS VACCINE  07/06/1977    Pneumococcal PPSV23 (Medium Risk) (1) 07/06/1977    Eye Exam  03/01/2017    Lipid Panel  07/26/2017    Influenza Vaccine  08/01/2017    Hemoglobin A1c  10/06/2017

## 2017-11-30 ENCOUNTER — LAB VISIT (OUTPATIENT)
Dept: LAB | Facility: HOSPITAL | Age: 57
End: 2017-11-30
Attending: INTERNAL MEDICINE
Payer: COMMERCIAL

## 2017-11-30 DIAGNOSIS — E03.9 HYPOTHYROIDISM, UNSPECIFIED TYPE: ICD-10-CM

## 2017-11-30 LAB
ALBUMIN SERPL BCP-MCNC: 3.6 G/DL
ALP SERPL-CCNC: 94 U/L
ALT SERPL W/O P-5'-P-CCNC: 17 U/L
ANION GAP SERPL CALC-SCNC: 7 MMOL/L
AST SERPL-CCNC: 16 U/L
BILIRUB SERPL-MCNC: 0.9 MG/DL
BUN SERPL-MCNC: 16 MG/DL
CALCIUM SERPL-MCNC: 8.9 MG/DL
CHLORIDE SERPL-SCNC: 102 MMOL/L
CHOLEST SERPL-MCNC: 165 MG/DL
CHOLEST/HDLC SERPL: 4.2 {RATIO}
CO2 SERPL-SCNC: 28 MMOL/L
CREAT SERPL-MCNC: 1.1 MG/DL
EST. GFR  (AFRICAN AMERICAN): >60 ML/MIN/1.73 M^2
EST. GFR  (NON AFRICAN AMERICAN): >60 ML/MIN/1.73 M^2
ESTIMATED AVG GLUCOSE: 160 MG/DL
GLUCOSE SERPL-MCNC: 221 MG/DL
HBA1C MFR BLD HPLC: 7.2 %
HDLC SERPL-MCNC: 39 MG/DL
HDLC SERPL: 23.6 %
LDLC SERPL CALC-MCNC: 105 MG/DL
NONHDLC SERPL-MCNC: 126 MG/DL
POTASSIUM SERPL-SCNC: 4.3 MMOL/L
PROT SERPL-MCNC: 6.6 G/DL
SODIUM SERPL-SCNC: 137 MMOL/L
T4 FREE SERPL-MCNC: 1.15 NG/DL
TRIGL SERPL-MCNC: 105 MG/DL
TSH SERPL DL<=0.005 MIU/L-ACNC: 8.33 UIU/ML

## 2017-11-30 PROCEDURE — 84443 ASSAY THYROID STIM HORMONE: CPT

## 2017-11-30 PROCEDURE — 80061 LIPID PANEL: CPT

## 2017-11-30 PROCEDURE — 80053 COMPREHEN METABOLIC PANEL: CPT

## 2017-11-30 PROCEDURE — 36415 COLL VENOUS BLD VENIPUNCTURE: CPT | Mod: PO

## 2017-11-30 PROCEDURE — 84439 ASSAY OF FREE THYROXINE: CPT

## 2017-11-30 PROCEDURE — 83036 HEMOGLOBIN GLYCOSYLATED A1C: CPT

## 2017-12-06 ENCOUNTER — OFFICE VISIT (OUTPATIENT)
Dept: ENDOCRINOLOGY | Facility: CLINIC | Age: 57
End: 2017-12-06
Payer: COMMERCIAL

## 2017-12-06 VITALS
DIASTOLIC BLOOD PRESSURE: 71 MMHG | WEIGHT: 207.63 LBS | SYSTOLIC BLOOD PRESSURE: 120 MMHG | HEIGHT: 70 IN | HEART RATE: 69 BPM | BODY MASS INDEX: 29.72 KG/M2

## 2017-12-06 DIAGNOSIS — E78.5 DYSLIPIDEMIA: ICD-10-CM

## 2017-12-06 DIAGNOSIS — E11.59 HYPERTENSION ASSOCIATED WITH DIABETES: ICD-10-CM

## 2017-12-06 DIAGNOSIS — E11.649 HYPOGLYCEMIA ASSOCIATED WITH DIABETES: ICD-10-CM

## 2017-12-06 DIAGNOSIS — G47.33 OBSTRUCTIVE SLEEP APNEA: ICD-10-CM

## 2017-12-06 DIAGNOSIS — H35.00 RETINOPATHY: ICD-10-CM

## 2017-12-06 DIAGNOSIS — I15.2 HYPERTENSION ASSOCIATED WITH DIABETES: ICD-10-CM

## 2017-12-06 DIAGNOSIS — E03.9 HYPOTHYROIDISM, UNSPECIFIED TYPE: ICD-10-CM

## 2017-12-06 PROCEDURE — 99999 PR PBB SHADOW E&M-EST. PATIENT-LVL III: CPT | Mod: PBBFAC,,, | Performed by: NURSE PRACTITIONER

## 2017-12-06 PROCEDURE — 99215 OFFICE O/P EST HI 40 MIN: CPT | Mod: S$GLB,,, | Performed by: NURSE PRACTITIONER

## 2017-12-06 RX ORDER — LEVOTHYROXINE SODIUM 25 UG/1
25 TABLET ORAL DAILY
Qty: 30 TABLET | Refills: 2 | Status: SHIPPED | OUTPATIENT
Start: 2017-12-06 | End: 2018-04-18 | Stop reason: SDUPTHER

## 2017-12-06 NOTE — PATIENT INSTRUCTIONS
shaun    Diabetes Support Group Meetings    Date Topics   February 9 Health Promotion/Nutrition   March 9 Taking Care of Your Kidneys   April 13 Taking Care of Your Feet   May 11 Ease Your Mind with Diabetes   June 8 Hurricane and Emergency Preparedness   July 13 Family & Caregiver Support for Diabetes   *August 17  Taking Care of Your Eyes   September 14 Devices & Technology   October 12 Recipes & Treats   November 9 Getting Pumped Up for Diabetes   December 14 Year-End Close Out            Meetings are held in the [facility] (A) of the [facility] located at [address] [place] [address]. Please call [phone] for additional information.    Free service, offered every 2nd Thursday of every month, except in August! Family members and/or friends are welcome as well!  Support group is for patients with type 1 or type 2 diabetes.    From 3:30p to 4:30p

## 2018-01-06 RX ORDER — LISINOPRIL 10 MG/1
TABLET ORAL
Qty: 30 TABLET | Refills: 11 | Status: SHIPPED | OUTPATIENT
Start: 2018-01-06 | End: 2019-04-11 | Stop reason: SDUPTHER

## 2018-02-04 RX ORDER — SIMVASTATIN 40 MG/1
TABLET, FILM COATED ORAL
Qty: 30 TABLET | Refills: 11 | Status: SHIPPED | OUTPATIENT
Start: 2018-02-04 | End: 2018-08-15

## 2018-04-18 RX ORDER — LEVOTHYROXINE SODIUM 25 UG/1
TABLET ORAL
Qty: 30 TABLET | Refills: 3 | Status: SHIPPED | OUTPATIENT
Start: 2018-04-18 | End: 2018-09-03 | Stop reason: SDUPTHER

## 2018-05-08 RX ORDER — INSULIN ASPART 100 [IU]/ML
INJECTION, SOLUTION INTRAVENOUS; SUBCUTANEOUS
Qty: 30 ML | Refills: 6 | Status: SHIPPED | OUTPATIENT
Start: 2018-05-08 | End: 2019-04-29 | Stop reason: SDUPTHER

## 2018-06-14 ENCOUNTER — PATIENT MESSAGE (OUTPATIENT)
Dept: ENDOCRINOLOGY | Facility: CLINIC | Age: 58
End: 2018-06-14

## 2018-07-02 ENCOUNTER — PATIENT MESSAGE (OUTPATIENT)
Dept: ENDOCRINOLOGY | Facility: CLINIC | Age: 58
End: 2018-07-02

## 2018-08-08 ENCOUNTER — LAB VISIT (OUTPATIENT)
Dept: LAB | Facility: HOSPITAL | Age: 58
End: 2018-08-08
Attending: INTERNAL MEDICINE
Payer: COMMERCIAL

## 2018-08-08 DIAGNOSIS — E03.9 HYPOTHYROIDISM, UNSPECIFIED TYPE: ICD-10-CM

## 2018-08-08 LAB
ESTIMATED AVG GLUCOSE: 148 MG/DL
HBA1C MFR BLD HPLC: 6.8 %
T4 FREE SERPL-MCNC: 1.01 NG/DL
TSH SERPL DL<=0.005 MIU/L-ACNC: 7.28 UIU/ML

## 2018-08-08 PROCEDURE — 84439 ASSAY OF FREE THYROXINE: CPT

## 2018-08-08 PROCEDURE — 84443 ASSAY THYROID STIM HORMONE: CPT

## 2018-08-08 PROCEDURE — 83036 HEMOGLOBIN GLYCOSYLATED A1C: CPT

## 2018-08-08 PROCEDURE — 36415 COLL VENOUS BLD VENIPUNCTURE: CPT | Mod: PO

## 2018-08-14 PROBLEM — E10.65 TYPE 1 DIABETES MELLITUS WITH HYPERGLYCEMIA: Status: ACTIVE | Noted: 2018-08-14

## 2018-08-14 NOTE — PROGRESS NOTES
"CC: Patient is here for management of Type 1 diabetes and review of medical conditions as listed in visit diagnosis.      HPI: Mr. Owen Radford is a 58 y.o. White male who was diagnosed with Type 1  DM x 25 years ago, seen by Dr. Sheffield in 2016.  Seen by me in Dec 2017 and is now being seen by me again today.  Pt switched to 630 G medtronic insulin pump in 2017, currently interested in upgrading to 670 g.  a1c has improved; however, a lot of variability noted on download.  Pt reports using -basal day off feature, overriding > 16% of the time, states that he feels that his #s have stayed elevated-recently adjusted basal and changed iob to 2 hours.    Lab Results   Component Value Date    HGBA1C 6.8 (H) 08/08/2018     CURRENT DIABETIC MEDS: novolog w/ insulin pump     Last Podiatry Exam: > 1 year    REVIEW OF SYSTEMS  General: no weakness, fatigue, +weight changes #6-7 gain.   Eyes: no double or blurred vision, eye pain, or redness; last eye exam= 2016. Wears glasses-overdue   Cardiovascular: no chest pain, palpitations, edema, or murmurs.   Respiratory: no cough or dyspnea.   GI: no heartburn, nausea, or changes in bowel patterns; good appetite.   Skin: no rashes, dryness, itching, or reactions at insulin injection sites.   Neuro: no numbness, tingling, tremors, or vertigo.   Endocrine: no polyuria, polydipsia, polyphagia, heat or cold intolerance.     Vital Signs  /73 (BP Location: Right arm, Patient Position: Sitting, BP Method: Medium (Manual))   Pulse 80   Ht 5' 10" (1.778 m)   Wt 97 kg (213 lb 13.5 oz)   BMI 30.68 kg/m²     Hemoglobin A1C   Date Value Ref Range Status   08/08/2018 6.8 (H) 4.0 - 5.6 % Final     Comment:     ADA Screening Guidelines:  5.7-6.4%  Consistent with prediabetes  >or=6.5%  Consistent with diabetes  High levels of fetal hemoglobin interfere with the HbA1C  assay. Heterozygous hemoglobin variants (HbS, HgC, etc)do  not significantly interfere with this assay.   However, " presence of multiple variants may affect accuracy.     11/30/2017 7.2 (H) 4.0 - 5.6 % Final     Comment:     According to ADA guidelines, hemoglobin A1c <7.0% represents  optimal control in non-pregnant diabetic patients. Different  metrics may apply to specific patient populations.   Standards of Medical Care in Diabetes-2016.  For the purpose of screening for the presence of diabetes:  <5.7%     Consistent with the absence of diabetes  5.7-6.4%  Consistent with increasing risk for diabetes   (prediabetes)  >or=6.5%  Consistent with diabetes  Currently, no consensus exists for use of hemoglobin A1c  for diagnosis of diabetes for children.  This Hemoglobin A1c assay has significant interference with fetal   hemoglobin   (HbF). The results are invalid for patients with abnormal amounts of   HbF,   including those with known Hereditary Persistence   of Fetal Hemoglobin. Heterozygous hemoglobin variants (HbAS, HbAC,   HbAD, HbAE, HbA2) do not significantly interfere with this assay;   however, presence of multiple variants in a sample may impact the %   interference.     04/06/2017 7.5 (H) 4.5 - 6.2 % Final     Comment:     According to ADA guidelines, hemoglobin A1C <7.0% represents  optimal control in non-pregnant diabetic patients.  Different  metrics may apply to specific populations.   Standards of Medical Care in Diabetes - 2016.  For the purpose of screening for the presence of diabetes:  <5.7%     Consistent with the absence of diabetes  5.7-6.4%  Consistent with increasing risk for diabetes   (prediabetes)  >or=6.5%  Consistent with diabetes  Currently no consensus exists for use of hemoglobin A1C  for diagnosis of diabetes for children.        Lab Results   Component Value Date    CREATININE 1.1 11/30/2017      Lab Results   Component Value Date    TSH 7.284 (H) 08/08/2018      Lab Results   Component Value Date    LDLCALC 105.0 11/30/2017        PHYSICAL EXAMINATION  Constitutional: Appears well, no  distress  Neck: Supple, trachea midline.   Respiratory: No wheezes, even and unlabored.  Cardiovascular: RRR; no carotid bruits or murmurs.   Lymph: DP pulses  2+ bilaterally; no edema.   Skin: warm and dry; no injection site reactions, no acanthosis nigracans observed.  Neuro:patient alert and cooperative, normal affect.    Diabetes Foot Exam:   Protective Sensation (w/ 10 gram monofilament):  Right: Intact  Left: Intact    Visual Inspection:  Dry Skin -  Bilateral    Pedal Pulses:   Right: Present  Left: Present    Posterior tibialis:   Right:Present  Left: Present      Assessment/Plan  1. Type 1 diabetes mellitus with hyperglycemia  Hemoglobin A1c next time  F/u in 3 mos  a1c goal less than 7%  With less variability, more even distribution of basal/bolus  Discussed extensively about input of carbs, bolus feature-change icr to 1:8, change iob to 2.5 hours, change isf from 40 to 30.  No changes with basal at this time  Reviewed tdd ~55-56  Pt may convert to 670 g soon- discussed features of pump, benefits.    Reviewed download with pt.    Counseling > 35 mins       Ambulatory referral to Podiatry > 1 year   2. Hypothyroidism, unspecified type  TSH in 4 weeks    TSH in 3 mos  Increase to 200 mcg, 50 mcg  Empty stomach, water only, 1 hour before food or other medications.   3. Hypoglycemia associated with diabetes  See above   4. Retinopathy  F/u with ophthalmology    5. Hypertension associated with diabetes  Controlled, continue acei   6. Dyslipidemia  Lab Results   Component Value Date    LDLCALC 105.0 11/30/2017     Near goal,continue statin   7. Obstructive sleep apnea  If uncontrolled, may increase insulin resistance.         FOLLOW UP  Follow-up in about 3 months (around 11/15/2018).     Orders Placed This Encounter   Procedures    Hemoglobin A1c     Standing Status:   Future     Standing Expiration Date:   10/13/2019    TSH     Standing Status:   Future     Standing Expiration Date:   10/14/2019    TSH      Standing Status:   Future     Standing Expiration Date:   10/14/2019    Ambulatory referral to Podiatry     Referral Priority:   Routine     Referral Type:   Consultation     Referral Reason:   Specialty Services Required     Requested Specialty:   Podiatry     Number of Visits Requested:   1

## 2018-08-15 ENCOUNTER — OFFICE VISIT (OUTPATIENT)
Dept: ENDOCRINOLOGY | Facility: CLINIC | Age: 58
End: 2018-08-15
Payer: COMMERCIAL

## 2018-08-15 VITALS
HEIGHT: 70 IN | HEART RATE: 80 BPM | BODY MASS INDEX: 30.62 KG/M2 | WEIGHT: 213.88 LBS | DIASTOLIC BLOOD PRESSURE: 73 MMHG | SYSTOLIC BLOOD PRESSURE: 110 MMHG

## 2018-08-15 DIAGNOSIS — E11.649 HYPOGLYCEMIA ASSOCIATED WITH DIABETES: ICD-10-CM

## 2018-08-15 DIAGNOSIS — E10.65 TYPE 1 DIABETES MELLITUS WITH HYPERGLYCEMIA: Primary | ICD-10-CM

## 2018-08-15 DIAGNOSIS — H35.00 RETINOPATHY: ICD-10-CM

## 2018-08-15 DIAGNOSIS — G47.33 OBSTRUCTIVE SLEEP APNEA: ICD-10-CM

## 2018-08-15 DIAGNOSIS — E78.5 DYSLIPIDEMIA: ICD-10-CM

## 2018-08-15 DIAGNOSIS — E03.9 HYPOTHYROIDISM, UNSPECIFIED TYPE: ICD-10-CM

## 2018-08-15 DIAGNOSIS — E11.59 HYPERTENSION ASSOCIATED WITH DIABETES: ICD-10-CM

## 2018-08-15 DIAGNOSIS — I15.2 HYPERTENSION ASSOCIATED WITH DIABETES: ICD-10-CM

## 2018-08-15 PROCEDURE — 99215 OFFICE O/P EST HI 40 MIN: CPT | Mod: S$GLB,,, | Performed by: NURSE PRACTITIONER

## 2018-08-15 PROCEDURE — 99999 PR PBB SHADOW E&M-EST. PATIENT-LVL IV: CPT | Mod: PBBFAC,,, | Performed by: NURSE PRACTITIONER

## 2018-08-15 RX ORDER — LEVOTHYROXINE SODIUM 50 UG/1
50 TABLET ORAL
Qty: 30 TABLET | Refills: 2 | Status: SHIPPED | OUTPATIENT
Start: 2018-08-15 | End: 2018-11-13 | Stop reason: SDUPTHER

## 2018-08-15 NOTE — PATIENT INSTRUCTIONS
Snacks can be an important part of a balanced, healthy meal plan. They allow you to eat more frequently, feeling full and satisfied throughout the day. Also, they allow you to spread carbohydrates evenly, which may stabilize blood sugars.  Plus, snacks are enjoyable!     The amount of carbohydrate needed at snacks varies. Generally, about 15-30 grams of carbohydrate per snack is recommended.  Below you will find some tasty treats.       0-5 gm carb   Crystal Light   Vitamin Water Zero   Herbal tea, unsweetened   2 tsp peanut butter on celery   1./2 cup sugar-free jell-o   1 sugar-free popsicle   ¼ cup blueberries   8oz Blue Swati unsweetened almond milk   5 baby carrots & celery sticks, cucumbers, bell peppers dipped in ¼ cup salsa, 2Tbsp light ranch dressing or 2Tbsp plain Greek yogurt   10 Goldfish crackers   ½ oz low-fat cheese or string cheese   1 closed handful of nuts, unsalted   1 Tbsp of sunflower seeds, unsalted   1 cup Smart Pop popcorn   1 whole grain brown rice cake        15 gm carb   1 small piece of fruit or ½ banana or 1/2 cup lite canned fruit   3 melodie cracker squares   3 cups Smart Pop popcorn, top spray butter, Rudd lite salt or cinnamon and Truvia   5 Vanilla Wafers   ½ cup low fat, no added sugar ice cream or frozen yogurt (Blue bell, Blue Bunny, Weight Watchers, Skinny Cow)   ½ turkey, ham, or chicken sandwich   ½ c fruit with ½ c Cottage cheese   4-6 unsalted wheat crackers with 1 oz low fat cheese or 1 tbsp peanut butter    30-45 goldfish crackers (depending on flavor)    7-8 Sikh mini brown rice cakes (caramel, apple cinnamon, chocolate)    12 Sikh mini brown rice cakes (cheddar, bbq, ranch)    1/3 cup hummus dip with raw veg   1/2 whole wheat divya, 1Tbsp hummus   Mini Pizza (1/2 whole wheat English muffin, low-fat  cheese, tomato sauce)   100 calorie snack pack (Oreo, Chips Ahoy, Ritz Mix, Baked Cheetos)   4-6 oz. light or Greek Style yogurt  (Rafael Solomon, [place], [facility])   ½ cup sugar-free pudding     6 in. wheat tortilla or divya oven toasted chips (topped with spray butter flavoring, cinnamon, Truvia OR spray butter, garlic powder, chili powder)    18 BBQ Popchips (available at [employer], [employer], Fresh Market)                   Diabetes Support Group Meetings         Date: Topic:   February 8 Health Promotion/Cooking Demo   March 8 Taking Care of Your Kidneys   April 12 Taking Care of Your Feet   May 10 Ease Your Mind with Diabetes   Mikala 14 Summer Treats/Cooking Demo   July 12 Super Market Sweep   August 9 Taking Care of Your Eyes   Sept 13 Technology/ADA updates   October 11 Recipes & Treats/Cooking Demo   November 8 Heart Health/Pump it up!   December 13 Year-End Close Out        Meetings are held in the [facility] (A) of the [facility] located at [address] [place] [address]. Please call [phone] for additional information.    Free service, offered every 2nd Thursday of every month! Family members and/or friends are welcome as well!  Support group is for patients with type 1 or type 2 diabetes.    From 3:30p to 4:30p

## 2018-09-03 RX ORDER — LEVOTHYROXINE SODIUM 25 UG/1
TABLET ORAL
Qty: 30 TABLET | Refills: 3 | Status: SHIPPED | OUTPATIENT
Start: 2018-09-03 | End: 2019-03-12 | Stop reason: SDUPTHER

## 2018-09-13 ENCOUNTER — LAB VISIT (OUTPATIENT)
Dept: LAB | Facility: HOSPITAL | Age: 58
End: 2018-09-13
Attending: NURSE PRACTITIONER
Payer: COMMERCIAL

## 2018-09-13 DIAGNOSIS — E03.9 HYPOTHYROIDISM, UNSPECIFIED TYPE: ICD-10-CM

## 2018-09-13 LAB
T4 FREE SERPL-MCNC: 1.3 NG/DL
TSH SERPL DL<=0.005 MIU/L-ACNC: 0.11 UIU/ML

## 2018-09-13 PROCEDURE — 84439 ASSAY OF FREE THYROXINE: CPT

## 2018-09-13 PROCEDURE — 36415 COLL VENOUS BLD VENIPUNCTURE: CPT | Mod: PO

## 2018-09-13 PROCEDURE — 84443 ASSAY THYROID STIM HORMONE: CPT

## 2018-09-14 ENCOUNTER — TELEPHONE (OUTPATIENT)
Dept: ENDOCRINOLOGY | Facility: CLINIC | Age: 58
End: 2018-09-14

## 2018-09-14 DIAGNOSIS — E03.9 HYPOTHYROIDISM, UNSPECIFIED TYPE: Primary | ICD-10-CM

## 2018-09-14 NOTE — TELEPHONE ENCOUNTER
Called and left a detail message to patient about his changes of his medication. Also schedule patient lab in 4 weeks

## 2018-09-14 NOTE — TELEPHONE ENCOUNTER
----- Message from MAGDA Bradley, YOUSIF sent at 9/14/2018  1:15 PM CDT -----  Change synthroid to 225 mcg daily vs 250 mcg daily-  tsh went too low.  Will repeat tsh in 4 weeks  Please relay message  Order is in.   Thanks  Ninfa

## 2018-09-17 ENCOUNTER — CLINICAL SUPPORT (OUTPATIENT)
Dept: DIABETES | Facility: CLINIC | Age: 58
End: 2018-09-17
Payer: COMMERCIAL

## 2018-09-17 DIAGNOSIS — E10.65 TYPE 1 DIABETES MELLITUS WITH HYPERGLYCEMIA: ICD-10-CM

## 2018-09-17 PROCEDURE — G0108 DIAB MANAGE TRN  PER INDIV: HCPCS | Mod: S$GLB,,, | Performed by: DIETITIAN, REGISTERED

## 2018-09-17 NOTE — Clinical Note
After he saw you he changed IOB from 2.5 to 2 hours. We discussed today all the things he was doing wrong and then changed IOB back to 3 hours for safety. He was agreeable nd will be seen back in 1-2 weeks for automode training. We reviewed many basic principles of insulin pump therapy. He was using same reservoir of insulin for 6 days, incorrectly filling canula, using same infusion sites without proper rotation (noticeable scar tissue on rt buttocks). He is not accurately counting carbs and often missing meal/snack boluses and/or bolusing after eating.

## 2018-09-17 NOTE — PROGRESS NOTES
Diabetes Education    Patient was seen in clinic today for insulin pump upgrade training and will be starting a Medtronic 670G Minimed Insulin pump. Pump training was provided per Medtronic protocol using Getting Started Guide.  Details of pump therapy were covered with the patient to include basic features of pump programming, filling of reservoir and priming infusion set. These features were reviewed in detail. Patient arrived with infusion set already inserted to left upper buttocks. Instructed patient on use of new pump features and set change when appropriate. Reviewed site selection, rotation, storage of insulin, treatment of hypoglycemia, hyperglycemia and troubleshooting of pump. Patient instructed to change reservoir and all tubing every three days. All of the old info from the old pump was copied into the new pump. Discussed new terminology - manual mode, suspend before low, auto mode, and safe basal. Explained week 1 pump and CGM to remain in manual mode and importance of this data to optimize accuracy of algorithm.     Reviewing pump download from 9/4-9/17 patient avg  mg/dl, avg  mg/dl.   TDD is 61 units/day, 76% basal, 24% bolus. avg carb intake 27 gm/day.     We reviewed many basic principles of insulin pump therapy. He was using same reservoir of insulin for 6 days, incorrectly filling canula, using same infusion sites without proper rotation (noticeable scar tissue on rt buttocks). He is not accurately counting carbs and often missing meal/snack boluses and/or bolusing after eating. He will return in 1-2 weeks for download and automode training.     Pump Settings:   Basal rate:   12:00-6:30 1.0 u/hr  6:30-12:30 2.5  12: 2.75  17-12 am 2.40    CR 1:9, ISF 40  Target 120-120  Active insulin 3 hours    Auto off off  Low [place] 30 units  Max Bolus 10 units  Max Basal 3 u/hr     Also linked Guardian sensor with 670G pump system. Reviewed features of Guardian sensor - improved  accuracy, 7 day wear, calibration every 12 hours required, recommended calibration 3-4 times daily. Encouraged pt to avoid calibrating when BG is changing rapidly or directional arrows are present. Details of connecting the CGMS and pump were covered. Pt successfully connected transmitter to pump, placed sensor, and started CGM in office today. Following sensor settings were entered:     Low limit: 80mg/dl  Suspend before low: on  Alert on resume: off  High limit: 200 mg/dl  Alert before high: off  Alert on high: on     Written materials provided. Patient verbalized understanding of all instructions given.   Encouraged pt to call me for questions.

## 2018-09-28 ENCOUNTER — CLINICAL SUPPORT (OUTPATIENT)
Dept: DIABETES | Facility: CLINIC | Age: 58
End: 2018-09-28
Payer: COMMERCIAL

## 2018-09-28 DIAGNOSIS — E10.65 TYPE 1 DIABETES MELLITUS WITH HYPERGLYCEMIA: ICD-10-CM

## 2018-09-28 PROCEDURE — G0108 DIAB MANAGE TRN  PER INDIV: HCPCS | Mod: S$GLB,,, | Performed by: DIETITIAN, REGISTERED

## 2018-09-28 NOTE — PROGRESS NOTES
Patient was seen in clinic today for insulin pump upgrade training and start of Medtronic 670G AUTOMODE. Pump training was provided per Medtronic protocol using Getting Started Guide.    Pump downloaded and CGM reviewed with provider. Per provider, changed:  ICR 1:10  ISF 1:35  IOB 2.5 hr  Target -110  Max bolus 15 units  Max basal 4.0 un/hr    Basal  MN-630a 1.5 un/h  630a-1230p 2.5 un/hr  1230p-500p 2.6 un/hr  500p-MN 2.4 un/hr    CGM and changes reviewed with pt. Pt successfully entered changes into bolus settings. Pt then entered Smart Guard menu and turned Auto Mode feature to on.     Reviewed new choices available in menu: checking Auto Mode Readiness status screen any time grey shield appears reflecting in safe basal mode, will remain in safe basal up to 90 min or until required steps are addressed, and always check for blue shield reflecting auto mode before bed.     In office today, readiness status screen shows BG calibration is required to start auto mode. BG reading in office was 76. Pt successfully enter calibration and left clinic with pump in auto mode.       Time spent with pt: 60 minutes

## 2018-09-28 NOTE — Clinical Note
He started automode today!I increased ICR cause he drops really fast after eating; few other minor changes. I definitely thing he will benefit from automode!I instructed him to set up [facility] at home so he could download in a month for review.

## 2018-11-02 ENCOUNTER — PATIENT OUTREACH (OUTPATIENT)
Dept: DIABETES | Facility: CLINIC | Age: 58
End: 2018-11-02

## 2018-11-02 NOTE — PROGRESS NOTES
Returned patient's call regarding pump/automode concerns.   Pt was started on Medtronic 670G on 9/17; started automode on 9/28.   Reports all working well since starting automode - never has low BG's, but BG's hover around 180-200 most of the day. Pt reports larger increases in BG after eating. Discussed possible changes to try to prevent spiking; recommended lowering carb ratio by 1g to see if this will make a difference in overall BG.   Instructed pt to upload pump to [employer] and send me username and password for review and insulin setting change recommendations from NP.

## 2018-11-13 RX ORDER — LEVOTHYROXINE SODIUM 50 UG/1
50 TABLET ORAL
Qty: 30 TABLET | Refills: 2 | Status: SHIPPED | OUTPATIENT
Start: 2018-11-13 | End: 2019-09-23

## 2019-01-22 ENCOUNTER — OFFICE VISIT (OUTPATIENT)
Dept: SLEEP MEDICINE | Facility: CLINIC | Age: 59
End: 2019-01-22
Payer: COMMERCIAL

## 2019-01-22 VITALS
SYSTOLIC BLOOD PRESSURE: 102 MMHG | BODY MASS INDEX: 29.95 KG/M2 | HEART RATE: 76 BPM | WEIGHT: 209.19 LBS | DIASTOLIC BLOOD PRESSURE: 66 MMHG | HEIGHT: 70 IN

## 2019-01-22 DIAGNOSIS — G47.33 OBSTRUCTIVE SLEEP APNEA: Primary | ICD-10-CM

## 2019-01-22 PROCEDURE — 99213 PR OFFICE/OUTPT VISIT, EST, LEVL III, 20-29 MIN: ICD-10-PCS | Mod: S$GLB,,, | Performed by: NURSE PRACTITIONER

## 2019-01-22 PROCEDURE — 99999 PR PBB SHADOW E&M-EST. PATIENT-LVL III: ICD-10-PCS | Mod: PBBFAC,,, | Performed by: NURSE PRACTITIONER

## 2019-01-22 PROCEDURE — 99213 OFFICE O/P EST LOW 20 MIN: CPT | Mod: S$GLB,,, | Performed by: NURSE PRACTITIONER

## 2019-01-22 PROCEDURE — 99999 PR PBB SHADOW E&M-EST. PATIENT-LVL III: CPT | Mod: PBBFAC,,, | Performed by: NURSE PRACTITIONER

## 2019-01-22 NOTE — PROGRESS NOTES
"Owen Abdullahi Prabhakar a 56-year-old man returns for the management of NELIA. Last seen 2016    After last seen rec'd compliance report 30d fall 2016: avg 4:06h/n. ahi 15.5, cpap 14cm inc'd to CPAP 15cm. Continues to sleep well, occasional disrupted. Doing "Well". Needs new supplies, using nose mask with forehead piece and no chin strap. Occasional oraldrying.   Interrogation- avg 7:06h/night. AHI 15-20, 0% leak, 1-3% periodic    HgBA1c 6. 8 (8/18)      HISTORY  3/19/15:   He last saw Dr. London in 2012, this is my initial visit with him. He has been using CPAP since diagnosed in 2012. Never had adherence monitoring. Denies pressure intolerance. Denies mouth drying. Denies nasal drying. Has some mask air leaks, causing him to remove his mask at times. Does not use chin strap. His machine has stopped working 2-mos ago. Now having return of excessive daytime sleepiness, snoring. Denies sinus congestion. Using nasal mask. Using therapy, less sleep disruptions, no snoring or air gasping. Daytime sleepiness overall improved, may still put head back and sleep if prolonged sedentary time, this is with using therapy. He needs new supplies.     Interrogation: no machine  ESS= 13 w/CPAP (17 w/o the past 2mos) (18)      11/10/16: He continues to use cpap nightly. Got reburbished machine after last seen for $100 from [employer]. No machine today. Did not receive appt reminder call yesterday. He bring machine smartcard but is using [employer]. He is not happy with them. Family member recently downloaded the card at her work and she told him pressure needed to be adjusted or something fixed. He has gained wgt since study. Does not feel sleep is as refreshing. ESS still >10. No snoring or air gasping using mask. Could use new supplies too. May begin using chin strap more often. ESS=11          Split-PSG 2012:  AHI of 109.9/hour and SaO2 kong of 89%. Effective control of respiratory events was reached at 14 cm H2O CPAP. " "(194#)        REVIEW OF SYSTEMS: REVIEW OF SYSTEMS: Sleep related symptoms as per HPI; 4# gain, denies interval medical change. Otherwise, a balance of 10 systems reviewed is negative.    PHYSICAL EXAM:  /66   Pulse 76   Ht 5' 10" (1.778 m)   Wt 94.9 kg (209 lb 3.5 oz)   BMI 30.02 kg/m²   GENERAL: Normal development, well groomed      ASSESSMENT:    1. Obstructive sleep apnea (NELIA), severe. Remains cpap adherent with overall improvement of symptoms, except residual elevated ESS and needs new supplies and possibly pressure adjustment (recent outside review of data card may reveal elevated ahi?) 1/22/19: Remains adherent with emperic cpap 15cm but residual elevated AHI 15-20.  Eligible for new machine      PLAN:  CHANGE to autoPAP mode 15-20cm. Notify office in 3-7d of AHI/90% tile and if any pressure intolerance, will then order either new apap machine or bipap titration study then new machine.  [facility]    Education: During our discussion today, we talked about the inadequate/suboptimal control of his NELIA as well as the potential ramifications of untreated sleep apnea, which could include daytime sleepiness, hypertension, heart disease and/or stroke.     See PCP re: ANUJA mgt  "

## 2019-01-31 ENCOUNTER — PATIENT MESSAGE (OUTPATIENT)
Dept: SLEEP MEDICINE | Facility: CLINIC | Age: 59
End: 2019-01-31

## 2019-01-31 DIAGNOSIS — G47.33 OBSTRUCTIVE SLEEP APNEA: Primary | ICD-10-CM

## 2019-02-07 ENCOUNTER — TELEPHONE (OUTPATIENT)
Dept: SLEEP MEDICINE | Facility: OTHER | Age: 59
End: 2019-02-07

## 2019-02-08 ENCOUNTER — TELEPHONE (OUTPATIENT)
Dept: SLEEP MEDICINE | Facility: OTHER | Age: 59
End: 2019-02-08

## 2019-02-13 DIAGNOSIS — E10.8 TYPE 1 DIABETES MELLITUS WITH COMPLICATION: Primary | ICD-10-CM

## 2019-02-14 ENCOUNTER — HOSPITAL ENCOUNTER (OUTPATIENT)
Dept: SLEEP MEDICINE | Facility: OTHER | Age: 59
Discharge: HOME | End: 2019-02-14
Attending: NURSE PRACTITIONER
Payer: COMMERCIAL

## 2019-02-14 ENCOUNTER — TELEPHONE (OUTPATIENT)
Dept: SLEEP MEDICINE | Facility: OTHER | Age: 59
End: 2019-02-14

## 2019-02-14 DIAGNOSIS — G47.33 OBSTRUCTIVE SLEEP APNEA: ICD-10-CM

## 2019-02-14 PROCEDURE — 95811 PR POLYSOMNOGRAPHY W/CPAP: ICD-10-PCS | Mod: 26,,, | Performed by: PSYCHIATRY & NEUROLOGY

## 2019-02-14 PROCEDURE — 95811 POLYSOM 6/>YRS CPAP 4/> PARM: CPT | Mod: 26,,, | Performed by: PSYCHIATRY & NEUROLOGY

## 2019-02-14 PROCEDURE — 95811 POLYSOM 6/>YRS CPAP 4/> PARM: CPT

## 2019-02-15 NOTE — PROGRESS NOTES
"Owen Radford to [facility] for an overnight sleep study on 02/14/2019.  Pt. education, setup and Bipap explanation given prior to study.      Difficult titration.  Study started with heated humidity, biflex, chinstrap and medium Eson nasal mask. Frequent arousal observed with leg activity.  Mouthleak observed following arousals.1207 Mask changed to medium Simplus full mask.0305 to small Simplus to reduce leak.      Bipap pressures explored from 12/8cm to 21/17cm H2O.  Central type events observed . Back up rate added  12/min then to 14/ min in effort to maintain sleep.0430 Back up rate off for observation.  HOB and knees raised slightly for pt comfort. ? optimal setting.      In AM- Pt reports: "I don't usually feel my legs that much"   "I could wear that last mask if I needed to"(small Simplus) Pt denies difficulty with mask or pressure.      Post study information given in am.        "

## 2019-02-22 DIAGNOSIS — G47.33 OBSTRUCTIVE SLEEP APNEA: Primary | ICD-10-CM

## 2019-02-22 NOTE — PROCEDURES
"Dear Referring Provider,    The sleep study that you ordered is complete. You have ordered sleep LAB services to perform the sleep study for Owen Radford.     Please find Sleep Study result in "Chart Review" under the "Media tab."     As the ordering provider, you are responsible for reviewing the results and implementing a treatment plan with your patient. If you need a Sleep Medicine provider to explain the sleep study findings and arrange treatment for the patient, please refer patient for consultation to our Sleep Clinic via [facility] with Ambulatory Consult Sleep.    To do that please place an order for an "Ambulatory Consult Sleep" - it will go to our clinic work queue for our Medical Assistant to contact the patient for an appointment.     For any questions, please contact our clinic staff at [phone] to talk to clinical staff.      "

## 2019-03-08 ENCOUNTER — PATIENT MESSAGE (OUTPATIENT)
Dept: ENDOCRINOLOGY | Facility: CLINIC | Age: 59
End: 2019-03-08

## 2019-03-12 RX ORDER — LEVOTHYROXINE SODIUM 25 UG/1
TABLET ORAL
Qty: 30 TABLET | Refills: 3 | Status: SHIPPED | OUTPATIENT
Start: 2019-03-12 | End: 2019-07-03 | Stop reason: SDUPTHER

## 2019-03-13 ENCOUNTER — OFFICE VISIT (OUTPATIENT)
Dept: ENDOCRINOLOGY | Facility: CLINIC | Age: 59
End: 2019-03-13
Payer: COMMERCIAL

## 2019-03-13 ENCOUNTER — LAB VISIT (OUTPATIENT)
Dept: LAB | Facility: HOSPITAL | Age: 59
End: 2019-03-13
Payer: COMMERCIAL

## 2019-03-13 VITALS
RESPIRATION RATE: 18 BRPM | HEART RATE: 76 BPM | HEIGHT: 70 IN | DIASTOLIC BLOOD PRESSURE: 70 MMHG | BODY MASS INDEX: 29.12 KG/M2 | WEIGHT: 203.38 LBS | SYSTOLIC BLOOD PRESSURE: 134 MMHG

## 2019-03-13 DIAGNOSIS — E10.65 TYPE 1 DIABETES MELLITUS WITH HYPERGLYCEMIA: ICD-10-CM

## 2019-03-13 DIAGNOSIS — I15.2 HYPERTENSION ASSOCIATED WITH DIABETES: ICD-10-CM

## 2019-03-13 DIAGNOSIS — E03.9 HYPOTHYROIDISM, UNSPECIFIED TYPE: ICD-10-CM

## 2019-03-13 DIAGNOSIS — E10.65 TYPE 1 DIABETES MELLITUS WITH HYPERGLYCEMIA: Primary | ICD-10-CM

## 2019-03-13 DIAGNOSIS — E78.5 DYSLIPIDEMIA: ICD-10-CM

## 2019-03-13 DIAGNOSIS — E11.59 HYPERTENSION ASSOCIATED WITH DIABETES: ICD-10-CM

## 2019-03-13 LAB
ALBUMIN/CREAT UR: 5.6 UG/MG
ANION GAP SERPL CALC-SCNC: 5 MMOL/L
BUN SERPL-MCNC: 11 MG/DL
CALCIUM SERPL-MCNC: 9.1 MG/DL
CHLORIDE SERPL-SCNC: 103 MMOL/L
CHOLEST SERPL-MCNC: 147 MG/DL
CHOLEST/HDLC SERPL: 3.9 {RATIO}
CO2 SERPL-SCNC: 27 MMOL/L
CREAT SERPL-MCNC: 0.9 MG/DL
CREAT UR-MCNC: 108 MG/DL
EST. GFR  (AFRICAN AMERICAN): >60 ML/MIN/1.73 M^2
EST. GFR  (NON AFRICAN AMERICAN): >60 ML/MIN/1.73 M^2
ESTIMATED AVG GLUCOSE: 157 MG/DL
GLUCOSE SERPL-MCNC: 178 MG/DL
HBA1C MFR BLD HPLC: 7.1 %
HDLC SERPL-MCNC: 38 MG/DL
HDLC SERPL: 25.9 %
LDLC SERPL CALC-MCNC: 87.8 MG/DL
MICROALBUMIN UR DL<=1MG/L-MCNC: 6 UG/ML
NONHDLC SERPL-MCNC: 109 MG/DL
POTASSIUM SERPL-SCNC: 4.1 MMOL/L
SODIUM SERPL-SCNC: 135 MMOL/L
T4 FREE SERPL-MCNC: 1.11 NG/DL
TRIGL SERPL-MCNC: 106 MG/DL
TSH SERPL DL<=0.005 MIU/L-ACNC: 1.63 UIU/ML

## 2019-03-13 PROCEDURE — 95251 PR GLUCOSE MONITOR, 72 HOUR, PHYS INTERP: ICD-10-PCS | Mod: S$GLB,,, | Performed by: INTERNAL MEDICINE

## 2019-03-13 PROCEDURE — 84443 ASSAY THYROID STIM HORMONE: CPT

## 2019-03-13 PROCEDURE — 99999 PR PBB SHADOW E&M-EST. PATIENT-LVL IV: ICD-10-PCS | Mod: PBBFAC,,,

## 2019-03-13 PROCEDURE — 80048 BASIC METABOLIC PNL TOTAL CA: CPT

## 2019-03-13 PROCEDURE — 99214 PR OFFICE/OUTPT VISIT, EST, LEVL IV, 30-39 MIN: ICD-10-PCS | Mod: 25,S$GLB,, | Performed by: INTERNAL MEDICINE

## 2019-03-13 PROCEDURE — 84439 ASSAY OF FREE THYROXINE: CPT

## 2019-03-13 PROCEDURE — 80061 LIPID PANEL: CPT

## 2019-03-13 PROCEDURE — 82043 UR ALBUMIN QUANTITATIVE: CPT

## 2019-03-13 PROCEDURE — 99214 OFFICE O/P EST MOD 30 MIN: CPT | Mod: 25,S$GLB,, | Performed by: INTERNAL MEDICINE

## 2019-03-13 PROCEDURE — 99999 PR PBB SHADOW E&M-EST. PATIENT-LVL IV: CPT | Mod: PBBFAC,,,

## 2019-03-13 PROCEDURE — 36415 COLL VENOUS BLD VENIPUNCTURE: CPT

## 2019-03-13 PROCEDURE — 83036 HEMOGLOBIN GLYCOSYLATED A1C: CPT

## 2019-03-13 PROCEDURE — 95251 CONT GLUC MNTR ANALYSIS I&R: CPT | Mod: S$GLB,,, | Performed by: INTERNAL MEDICINE

## 2019-03-13 NOTE — ASSESSMENT & PLAN NOTE
Pt is clinically euthyroid.   Check TSH & FT4 today.   Will adjust dose of levothyroxine based on the TFT results.

## 2019-03-13 NOTE — ASSESSMENT & PLAN NOTE
Pt has well controlled type 1 DM.     -Based on the CGM results, pt has elevated BG levels during the day. Most likely will require higher dose of bolus with meals.   -Change ICR to 1:4 with breakfast, 1:5 with lunch and dinner.  -Change ISF to 1:25  -Recommend pt to give bolus when consuming coffee.  -Check HbA1c, urine microalbumin, BMP for renal function, and lipid panel today.  -Follow up in 6 months.

## 2019-03-13 NOTE — PROGRESS NOTES
CHIEF COMPLAINT: Type 1 Diabetes     HPI: Owen Radford is a 58 y.o. male who was diagnosed with type 1 DM about 26 years ago.    Current diabetes regimen:  Pump: Medtronic 670G    Pump Settings  ICR: 1:6  ISF: 1:20    Target: 110  IAT: 2.15 hours    TDD: 55 units  Basal 60%; Bolus 40%  Sensor wear 78% of the time. Reports that his sensor fell off one day prior to the switch, and thus he changed from auto mode to manual mode.    Hemoglobin A1C   Date Value Ref Range Status   08/08/2018 6.8 (H) 4.0 - 5.6 % Final     Comment:     ADA Screening Guidelines:  5.7-6.4%  Consistent with prediabetes  >or=6.5%  Consistent with diabetes  High levels of fetal hemoglobin interfere with the HbA1C  assay. Heterozygous hemoglobin variants (HbS, HgC, etc)do  not significantly interfere with this assay.   However, presence of multiple variants may affect accuracy.     11/30/2017 7.2 (H) 4.0 - 5.6 % Final     Comment:     According to ADA guidelines, hemoglobin A1c <7.0% represents  optimal control in non-pregnant diabetic patients. Different  metrics may apply to specific patient populations.   Standards of Medical Care in Diabetes-2016.  For the purpose of screening for the presence of diabetes:  <5.7%     Consistent with the absence of diabetes  5.7-6.4%  Consistent with increasing risk for diabetes   (prediabetes)  >or=6.5%  Consistent with diabetes  Currently, no consensus exists for use of hemoglobin A1c  for diagnosis of diabetes for children.  This Hemoglobin A1c assay has significant interference with fetal   hemoglobin   (HbF). The results are invalid for patients with abnormal amounts of   HbF,   including those with known Hereditary Persistence   of Fetal Hemoglobin. Heterozygous hemoglobin variants (HbAS, HbAC,   HbAD, HbAE, HbA2) do not significantly interfere with this assay;   however, presence of multiple variants in a sample may impact the %   interference.     04/06/2017 7.5 (H) 4.5 - 6.2 % Final     Comment:      According to ADA guidelines, hemoglobin A1C <7.0% represents  optimal control in non-pregnant diabetic patients.  Different  metrics may apply to specific populations.   Standards of Medical Care in Diabetes - 2016.  For the purpose of screening for the presence of diabetes:  <5.7%     Consistent with the absence of diabetes  5.7-6.4%  Consistent with increasing risk for diabetes   (prediabetes)  >or=6.5%  Consistent with diabetes  Currently no consensus exists for use of hemoglobin A1C  for diagnosis of diabetes for children.          Glucose Monitoring:  Meter/CGM: Guardian  Sensor and pump report downloaded and reviewed, see report in media tab    Pt is monitoring blood glucose readings 4 times a day.  Needs >100 strips per month related to fluctuations with blood glucose reading, A1c trends, and activity level.    Hypoglycemic Episodes:   Since last visit no severe hypoglycemic episodes requiring observer intervention    Screening / DM Complications:  Last Podiatry Exam: More than a couple of years ago  Neuropathy: No  Last eye exam: Nov, 2018   CVD/MI: No    Lab Results   Component Value Date    TSH 0.113 (L) 09/13/2018     No results found for: TTGIGA    Diet/Exercise:  Eats a low carb diet.   Usually eats a banana for breakfast.   Lunch: subway sandwich/ red beans  Dinner: Chicken with peas and brown rice      REVIEW OF SYSTEMS  General: no weakness, fatigue, or weight changes.   Eyes: no double or blurred vision, eye pain, or redness  Cardiovascular: no chest pain, palpitations, edema, or murmurs.   Respiratory: no cough or dyspnea.   GI: no heartburn, nausea, or changes in bowel patterns; good appetite.   Skin: no rashes, dryness, itching, or reactions at insulin injection sites.  Neuro: no numbness, tingling, tremors, or vertigo.   Endocrine: no polyuria, polydipsia, polyphagia, heat or cold intolerance.       PHYSICAL EXAMINATION  Constitutional: Appears well, no distress  Neck: Supple, trachea  midline.   Respiratory: CTA without wheezes, even and unlabored.  Cardiovascular: RRR; no carotid bruits or murmurs; (+) DP pulses; no edema.   Lymph: no lymphadenopathy palpated  Skin: warm and dry; no injection site reactions, no acanthosis nigracans observed.  Neuro:patient alert and cooperative, normal affect; steady gait.    Diabetes Foot Exam:   Protective Sensation (w/ 10 gram monofilament):  Right: Intact  Left: Intact    Visual Inspection:  Normal -  Bilateral    Pedal Pulses:   Right: Present  Left: Present          Chemistry        Component Value Date/Time     11/30/2017 1202    K 4.3 11/30/2017 1202     11/30/2017 1202    CO2 28 11/30/2017 1202    BUN 16 11/30/2017 1202    CREATININE 1.1 11/30/2017 1202     (H) 11/30/2017 1202        Component Value Date/Time    CALCIUM 8.9 11/30/2017 1202    ALKPHOS 94 11/30/2017 1202    AST 16 11/30/2017 1202    ALT 17 11/30/2017 1202    BILITOT 0.9 11/30/2017 1202    ESTGFRAFRICA >60.0 11/30/2017 1202    EGFRNONAA >60.0 11/30/2017 1202           Lab Results   Component Value Date    CHOL 165 11/30/2017    CHOL 159 07/26/2016    CHOL 177 03/07/2006     Lab Results   Component Value Date    HDL 39 (L) 11/30/2017    HDL 46 07/26/2016    HDL 48.0 03/07/2006     Lab Results   Component Value Date    LDLCALC 105.0 11/30/2017    LDLCALC 99.4 07/26/2016    LDLCALC 101.4 03/07/2006     Lab Results   Component Value Date    TRIG 105 11/30/2017    TRIG 68 07/26/2016    TRIG 138 03/07/2006     Lab Results   Component Value Date    CHOLHDL 23.6 11/30/2017    CHOLHDL 28.9 07/26/2016    CHOLHDL 27.1 03/07/2006         For hypothyroidism, he is currently taking Levothyroxine 225 mcg, daily.  Reports taking it in the morning on an empty stomach, and waits about 30 min prior to eating.    Assessment/Plan    Hypothyroidism  Pt is clinically euthyroid.   Check TSH & FT4 today.   Will adjust dose of levothyroxine based on the TFT results.     Type 1 diabetes mellitus  with hyperglycemia  Pt has well controlled type 1 DM.     -Based on the CGM results, pt has elevated BG levels during the day. Most likely will require higher dose of bolus with meals.   -Change ICR to 1:4 with breakfast, 1:5 with lunch and dinner.  -Change ISF to 1:25  -Recommend pt to give bolus when consuming coffee.  -Check HbA1c, urine microalbumin, BMP for renal function, and lipid panel today.  -Follow up in 6 months.      Dyslipidemia  On atorvastatin    Hypertension associated with diabetes  On lisinopril          Pump backup plan  If the insulin pump is non functional and discontinued for anticipated more than 20 hours, please give daily injections of:  Long acting insulin 22 units daily  Short acting insulin 10 for meals according to carb ratios and sensitivity factor in the pump.    When the insulin pump is restarted, do not restart basal rates until at least 22 hours after the last long acting insulin injection. You can set a 0% temporary basal setting that will last until this time and use your pump to bolus for meals and correction.    For any technical insulin pump issues, please contact the insulin pump company; the toll free number is printed on the label on the back of the insulin pump.

## 2019-03-13 NOTE — PATIENT INSTRUCTIONS
Change ICR to 1:4 with breakfast, 1:5 with lunch and dinner.  Change ISF to 1:25  Would recommend giving bolus when consuming coffee.

## 2019-04-11 RX ORDER — LISINOPRIL 10 MG/1
TABLET ORAL
Qty: 90 TABLET | Refills: 3 | Status: SHIPPED | OUTPATIENT
Start: 2019-04-11 | End: 2020-05-25

## 2019-04-11 RX ORDER — SIMVASTATIN 40 MG/1
TABLET, FILM COATED ORAL
Qty: 90 TABLET | Refills: 3 | Status: SHIPPED | OUTPATIENT
Start: 2019-04-11 | End: 2020-05-25

## 2019-04-29 RX ORDER — INSULIN ASPART 100 [IU]/ML
INJECTION, SOLUTION INTRAVENOUS; SUBCUTANEOUS
Qty: 20 ML | Refills: 6 | Status: SHIPPED | OUTPATIENT
Start: 2019-04-29 | End: 2019-10-03 | Stop reason: SDUPTHER

## 2019-04-29 NOTE — TELEPHONE ENCOUNTER
----- Message from Galina Bernal sent at 4/29/2019  3:41 PM CDT -----  Contact: Self [phone]  .Refill request.  NOVOLOG U-100 INSULIN ASPART 100 unit/mL injection    ..  [facility]/pharmacy #[id number] - [place] - [address]  [place] [address]  Phone: [phone] Fax: [phone]

## 2019-06-13 ENCOUNTER — TELEPHONE (OUTPATIENT)
Dept: SLEEP MEDICINE | Facility: CLINIC | Age: 59
End: 2019-06-13

## 2019-07-03 RX ORDER — LEVOTHYROXINE SODIUM 25 UG/1
TABLET ORAL
Qty: 90 TABLET | Refills: 1 | Status: SHIPPED | OUTPATIENT
Start: 2019-07-03 | End: 2020-03-24

## 2019-07-08 ENCOUNTER — TELEPHONE (OUTPATIENT)
Dept: ENDOCRINOLOGY | Facility: CLINIC | Age: 59
End: 2019-07-08

## 2019-07-17 ENCOUNTER — OFFICE VISIT (OUTPATIENT)
Dept: NEUROLOGY | Facility: CLINIC | Age: 59
End: 2019-07-17
Payer: COMMERCIAL

## 2019-07-17 VITALS
WEIGHT: 203.94 LBS | HEART RATE: 71 BPM | HEIGHT: 70 IN | DIASTOLIC BLOOD PRESSURE: 64 MMHG | BODY MASS INDEX: 29.2 KG/M2 | SYSTOLIC BLOOD PRESSURE: 105 MMHG

## 2019-07-17 DIAGNOSIS — G25.81 RLS (RESTLESS LEGS SYNDROME): ICD-10-CM

## 2019-07-17 DIAGNOSIS — G47.33 OBSTRUCTIVE SLEEP APNEA: Primary | ICD-10-CM

## 2019-07-17 PROCEDURE — 99213 PR OFFICE/OUTPT VISIT, EST, LEVL III, 20-29 MIN: ICD-10-PCS | Mod: S$GLB,,, | Performed by: NURSE PRACTITIONER

## 2019-07-17 PROCEDURE — 99999 PR PBB SHADOW E&M-EST. PATIENT-LVL III: CPT | Mod: PBBFAC,,, | Performed by: NURSE PRACTITIONER

## 2019-07-17 PROCEDURE — 99213 OFFICE O/P EST LOW 20 MIN: CPT | Mod: S$GLB,,, | Performed by: NURSE PRACTITIONER

## 2019-07-17 PROCEDURE — 99999 PR PBB SHADOW E&M-EST. PATIENT-LVL III: ICD-10-PCS | Mod: PBBFAC,,, | Performed by: NURSE PRACTITIONER

## 2019-07-17 RX ORDER — ROPINIROLE 0.5 MG/1
.5-1 TABLET, FILM COATED ORAL NIGHTLY
Qty: 60 TABLET | Refills: 2 | Status: SHIPPED | OUTPATIENT
Start: 2019-07-17 | End: 2019-09-23

## 2019-07-17 RX ORDER — ROPINIROLE 0.5 MG/1
.5-1 TABLET, FILM COATED ORAL NIGHTLY
Qty: 60 TABLET | Refills: 2 | Status: SHIPPED | OUTPATIENT
Start: 2019-07-17 | End: 2019-07-17 | Stop reason: SDUPTHER

## 2019-07-17 NOTE — PROGRESS NOTES
CC:  Demikaylyn Costa is here today for Medicare Wellness Visit (subsequent)     Medications: medications verified, no change  Refills needed today?  NO  denies Latex allergy or sensitivity  Patient would like communication of their results via:      Cell Phone:   Telephone Information:   Mobile [phone]     Okay to leave a message containing results? Yes  Tobacco history: verified  Patient's current [facility] status: Active.    Pharmacy Verified?  Yes    Health Maintenance Due   Topic Date Due   • Shingles Vaccine (1 of 2) 01/19/1994   • Colorectal Cancer Screening-Colonoscopy  05/08/2019   • Medicare Wellness 65+  06/21/2019   • Depression Screening  06/21/2019       Patient is due for the topics as listed above and wishes to proceed with them.    Patient has upcoming colonoscopy scheduled.      Unaddressed Risk Adjusted HCC Categories and Diagnoses  HCC 96 - Heart Arrhythmias   Unaddressed Dx:Atrial Fibrillation, Unspecified Type (Cms/Hcc)       Owen Abdullahi Prabhakar a 56-year-old man returns for the management of NELIA. Last seen 1/2019    He underwent bipap titration study and got new machine 19/15cm. Doing well. Not sure if still moving legs/limbs all night long as he was before, now using more effective therapy. Denies breakthrough snoring. Using FFM  Encore:  Date Range: 5/18/2019 - 7/16/2019     Hide 1.5% leak     Compliance Summary  Apnea Indices    Days with Device Usage:  56 days  Average AHI:  9.0    Percentage of Days >=4 Hours:  81.7%     Average Usage (Days Used):  6 hrs. 55 mins. 40 secs.     Average Usage (All Days):  6 hrs. 27 mins. 58 secs.           HISTORY  3/19/15:   He last saw Dr. London in 2012, this is my initial visit with him. He has been using CPAP since diagnosed in 2012. Never had adherence monitoring. Denies pressure intolerance. Denies mouth drying. Denies nasal drying. Has some mask air leaks, causing him to remove his mask at times. Does not use chin strap. His machine has stopped working 2-mos ago. Now having return of excessive daytime sleepiness, snoring. Denies sinus congestion. Using nasal mask. Using therapy, less sleep disruptions, no snoring or air gasping. Daytime sleepiness overall improved, may still put head back and sleep if prolonged sedentary time, this is with using therapy. He needs new supplies.     Interrogation: no machine  ESS= 13 w/CPAP (17 w/o the past 2mos) (18)      11/10/16: He continues to use cpap nightly. Got reburbished machine after last seen for $100 from [employer]. No machine today. Did not receive appt reminder call yesterday. He bring machine smartcard but is using [employer]. He is not happy with them. Family member recently downloaded the card at her work and she told him pressure needed to be adjusted or something fixed. He has gained wgt since study. Does not feel sleep is as refreshing. ESS still >10. No snoring or air gasping using mask. Could use new supplies too. May begin using chin  "strap more often. ESS=11      1/2019:  After last seen rec'd compliance report 30d fall 2016: avg 4:06h/n. ahi 15.5, cpap 14cm inc'd to CPAP 15cm. Continues to sleep well, occasional disrupted. Doing "Well". Needs new supplies, using nose mask with forehead piece and no chin strap. Occasional oraldrying.   Interrogation- avg 7:06h/night. AHI 15-20, 0% leak, 1-3% periodic    HgBA1c 6. 8 (8/18)  Split-PSG 2012:  AHI of 109.9/hour and SaO2 kong of 89%. Effective control of respiratory events was reached at 14 cm H2O CPAP. (194#)        REVIEW OF SYSTEMS: REVIEW OF SYSTEMS: Sleep related symptoms as per HPI; 6# loss. Otherwise, a balance of 10 systems reviewed is negative.    PHYSICAL EXAM:  /64   Pulse 71   Ht 5' 10" (1.778 m)   Wt 92.5 kg (203 lb 14.8 oz)   BMI 29.26 kg/m²   GENERAL: Normal development, well groomed      ASSESSMENT:    1. Obstructive sleep apnea (NELIA), severe. Remains cpap adherent with overall improvement of symptoms, except residual elevated ESS and needs new supplies and possibly pressure adjustment (recent outside review of data card may reveal elevated ahi?) 1/22/19: Remains adherent with emperic cpap 15cm but residual elevated AHI 15-20.  Eligible for new machine 7/17/19: Adherent with bipap, AHI<10, benefiting from therapy      PLAN:  Continue bipap 19/15cm. [facility] prnsupplies    See PCP re: DM mgt (insulin pump)  "

## 2019-07-23 NOTE — PROGRESS NOTES
"CC: Patient is here for management of Type 1 diabetes and review of medical conditions as listed in visit diagnosis.      HPI: Mr. Owen Radford is a 57 y.o. White male who was diagnosed with Type 1  DM x 25 years ago, seen by Dr. Sheffield in 2016.  Seen by me in April 2017 and is now being seen by me again today.  Pt recently switched to 630 G medtronic insulin pump.  a1c has improved   Lab Results   Component Value Date    HGBA1C 7.2 (H) 11/30/2017     Override per download ~40%, no severe lows noted.  CURRENT DIABETIC MEDS: novolog w/ insulin pump     Last Podiatry Exam: > 1 year    REVIEW OF SYSTEMS  General: no weakness, fatigue, or weight changes #12 gain.   Eyes: no double or blurred vision, eye pain, or redness; last eye exam= 2016. Wears glasses  Cardiovascular: no chest pain, palpitations, edema, or murmurs.   Respiratory: no cough or dyspnea.   GI: no heartburn, nausea, or changes in bowel patterns; good appetite.   Skin: no rashes, dryness, itching, or reactions at insulin injection sites.   Neuro: no numbness, tingling, tremors, or vertigo.   Endocrine: no polyuria, polydipsia, polyphagia, heat or cold intolerance.     Vital Signs  /71 (BP Location: Right arm, Patient Position: Sitting)   Pulse 69   Ht 5' 10" (1.778 m)   Wt 94.2 kg (207 lb 9.6 oz)   BMI 29.79 kg/m²     Hemoglobin A1C   Date Value Ref Range Status   11/30/2017 7.2 (H) 4.0 - 5.6 % Final     Comment:     According to ADA guidelines, hemoglobin A1c <7.0% represents  optimal control in non-pregnant diabetic patients. Different  metrics may apply to specific patient populations.   Standards of Medical Care in Diabetes-2016.  For the purpose of screening for the presence of diabetes:  <5.7%     Consistent with the absence of diabetes  5.7-6.4%  Consistent with increasing risk for diabetes   (prediabetes)  >or=6.5%  Consistent with diabetes  Currently, no consensus exists for use of hemoglobin A1c  for diagnosis of diabetes for " children.  This Hemoglobin A1c assay has significant interference with fetal   hemoglobin   (HbF). The results are invalid for patients with abnormal amounts of   HbF,   including those with known Hereditary Persistence   of Fetal Hemoglobin. Heterozygous hemoglobin variants (HbAS, HbAC,   HbAD, HbAE, HbA2) do not significantly interfere with this assay;   however, presence of multiple variants in a sample may impact the %   interference.     04/06/2017 7.5 (H) 4.5 - 6.2 % Final     Comment:     According to ADA guidelines, hemoglobin A1C <7.0% represents  optimal control in non-pregnant diabetic patients.  Different  metrics may apply to specific populations.   Standards of Medical Care in Diabetes - 2016.  For the purpose of screening for the presence of diabetes:  <5.7%     Consistent with the absence of diabetes  5.7-6.4%  Consistent with increasing risk for diabetes   (prediabetes)  >or=6.5%  Consistent with diabetes  Currently no consensus exists for use of hemoglobin A1C  for diagnosis of diabetes for children.     11/29/2016 7.8 (H) 4.5 - 6.2 % Final     Comment:     According to ADA guidelines, hemoglobin A1C <7.0% represents  optimal control in non-pregnant diabetic patients.  Different  metrics may apply to specific populations.   Standards of Medical Care in Diabetes - 2016.  For the purpose of screening for the presence of diabetes:  <5.7%     Consistent with the absence of diabetes  5.7-6.4%  Consistent with increasing risk for diabetes   (prediabetes)  >or=6.5%  Consistent with diabetes  Currently no consensus exists for use of hemoglobin A1C  for diagnosis of diabetes for children.        Lab Results   Component Value Date    CREATININE 1.1 11/30/2017      Lab Results   Component Value Date    TSH 8.332 (H) 11/30/2017      Lab Results   Component Value Date    LDLCALC 105.0 11/30/2017        PHYSICAL EXAMINATION  Constitutional: Appears well, no distress  Neck: Supple, trachea midline.   Respiratory:  No wheezes, even and unlabored.  Cardiovascular: RRR; no carotid bruits or murmurs.   Lymph: DP pulses  2+ bilaterally; no edema.   Skin: warm and dry; no injection site reactions, no acanthosis nigracans observed.  Neuro:patient alert and cooperative, normal affect.    Diabetes Foot Exam:   defer    Assessment/Plan    1. DM (diabetes mellitus), type 1, uncontrolled with complications -  DM near goal  Goal less than 7% w/ less variability.    Reviewed tdd and download.  Continue basal: mn-0630 1 u/hr, 0630 1.9  u/hr, 1330 1.8 u/hr, 1900 1.6 u/hr  continue icr 1:9,  Change isf to 40  Target   Change iob 3 hours  Bg ac/hs  Counseling >35 mins/interpretation  F/u in 3 mos  a1c next time  Counseling >35 mins       2. Hypertension associated with diabetes - stable, continue med(s)   3. Hypoglycemia associated with diabetes - reviewed, basal settings recently adjusted (lows during the afternoon)-less now   4. Hypothyroidism, unspecified type -continue levothyroxine 200 mcg add 25 mcg-send rx -repeat tsh in 8 weeks, may not take on empty stomach with water only every morning.  Lab Results   Component Value Date    TSH 8.332 (H) 11/30/2017        5. Retinopathy -f/u with ophthalmology/retinal specialist   6. Dyslipidemia -  Lab Results   Component Value Date    LDLCALC 105.0 11/30/2017     At goal. Continue statin        FOLLOW UP  Return in about 3 months (around 3/6/2018).     Orders Placed This Encounter   Procedures    Hemoglobin A1c     Standing Status:   Future     Standing Expiration Date:   2/4/2019    TSH     Standing Status:   Future     Standing Expiration Date:   2/4/2019          detailed exam

## 2019-09-03 ENCOUNTER — PATIENT MESSAGE (OUTPATIENT)
Dept: INTERNAL MEDICINE | Facility: CLINIC | Age: 59
End: 2019-09-03

## 2019-09-03 ENCOUNTER — TELEPHONE (OUTPATIENT)
Dept: INTERNAL MEDICINE | Facility: CLINIC | Age: 59
End: 2019-09-03

## 2019-09-03 DIAGNOSIS — E03.9 HYPOTHYROIDISM, UNSPECIFIED TYPE: ICD-10-CM

## 2019-09-03 DIAGNOSIS — E10.65 TYPE 1 DIABETES MELLITUS WITH HYPERGLYCEMIA: Primary | ICD-10-CM

## 2019-09-23 ENCOUNTER — OFFICE VISIT (OUTPATIENT)
Dept: INTERNAL MEDICINE | Facility: CLINIC | Age: 59
End: 2019-09-23
Payer: COMMERCIAL

## 2019-09-23 VITALS
DIASTOLIC BLOOD PRESSURE: 70 MMHG | WEIGHT: 209 LBS | SYSTOLIC BLOOD PRESSURE: 122 MMHG | HEIGHT: 70 IN | BODY MASS INDEX: 29.92 KG/M2

## 2019-09-23 DIAGNOSIS — E10.65 TYPE 1 DIABETES MELLITUS WITH HYPERGLYCEMIA: Primary | ICD-10-CM

## 2019-09-23 DIAGNOSIS — G47.33 OBSTRUCTIVE SLEEP APNEA: ICD-10-CM

## 2019-09-23 DIAGNOSIS — E78.5 DYSLIPIDEMIA: ICD-10-CM

## 2019-09-23 DIAGNOSIS — I15.2 HYPERTENSION ASSOCIATED WITH DIABETES: ICD-10-CM

## 2019-09-23 DIAGNOSIS — E11.59 HYPERTENSION ASSOCIATED WITH DIABETES: ICD-10-CM

## 2019-09-23 DIAGNOSIS — H35.00 RETINOPATHY: ICD-10-CM

## 2019-09-23 DIAGNOSIS — E11.649 HYPOGLYCEMIA ASSOCIATED WITH DIABETES: ICD-10-CM

## 2019-09-23 DIAGNOSIS — E03.9 HYPOTHYROIDISM, UNSPECIFIED TYPE: ICD-10-CM

## 2019-09-23 PROCEDURE — 99999 PR PBB SHADOW E&M-EST. PATIENT-LVL III: CPT | Mod: PBBFAC,,, | Performed by: NURSE PRACTITIONER

## 2019-09-23 PROCEDURE — 99999 PR PBB SHADOW E&M-EST. PATIENT-LVL III: ICD-10-PCS | Mod: PBBFAC,,, | Performed by: NURSE PRACTITIONER

## 2019-09-23 PROCEDURE — 99214 PR OFFICE/OUTPT VISIT, EST, LEVL IV, 30-39 MIN: ICD-10-PCS | Mod: S$GLB,,, | Performed by: NURSE PRACTITIONER

## 2019-09-23 PROCEDURE — 99214 OFFICE O/P EST MOD 30 MIN: CPT | Mod: S$GLB,,, | Performed by: NURSE PRACTITIONER

## 2019-09-23 RX ORDER — ROPINIROLE 0.5 MG/1
TABLET, FILM COATED ORAL
Refills: 2 | COMMUNITY
Start: 2019-08-18 | End: 2019-11-12 | Stop reason: SDUPTHER

## 2019-09-23 NOTE — PATIENT INSTRUCTIONS
Snacks can be an important part of a balanced, healthy meal plan. They allow you to eat more frequently, feeling full and satisfied throughout the day. Also, they allow you to spread carbohydrates evenly, which may stabilize blood sugars.  Plus, snacks are enjoyable!     The amount of carbohydrate needed at snacks varies. Generally, about 15-30 grams of carbohydrate per snack is recommended.  Below you will find some tasty treats.       0-5 gm carb   Crystal Light   Vitamin Water Zero   Herbal tea, unsweetened   2 tsp peanut butter on celery   1./2 cup sugar-free jell-o   1 sugar-free popsicle   ¼ cup blueberries   8oz Blue Swati unsweetened almond milk   5 baby carrots & celery sticks, cucumbers, bell peppers dipped in ¼ cup salsa, 2Tbsp light ranch dressing or 2Tbsp plain Greek yogurt   10 Goldfish crackers   ½ oz low-fat cheese or string cheese   1 closed handful of nuts, unsalted   1 Tbsp of sunflower seeds, unsalted   1 cup Smart Pop popcorn   1 whole grain brown rice cake        15 gm carb   1 small piece of fruit or ½ banana or 1/2 cup lite canned fruit   3 melodie cracker squares   3 cups Smart Pop popcorn, top spray butter, Rudd lite salt or cinnamon and Truvia   5 Vanilla Wafers   ½ cup low fat, no added sugar ice cream or frozen yogurt (Blue bell, Blue Bunny, Weight Watchers, Skinny Cow)   ½ turkey, ham, or chicken sandwich   ½ c fruit with ½ c Cottage cheese   4-6 unsalted wheat crackers with 1 oz low fat cheese or 1 tbsp peanut butter    30-45 goldfish crackers (depending on flavor)    7-8 Confucianist mini brown rice cakes (caramel, apple cinnamon, chocolate)    12 Confucianist mini brown rice cakes (cheddar, bbq, ranch)    1/3 cup hummus dip with raw veg   1/2 whole wheat divya, 1Tbsp hummus   Mini Pizza (1/2 whole wheat English muffin, low-fat  cheese, tomato sauce)   100 calorie snack pack (Oreo, Chips Ahoy, Ritz Mix, Baked Cheetos)   4-6 oz. light or Greek Style yogurt  (Juanito, Rafael, Concha, [facility])   ½ cup sugar-free pudding     6 in. wheat tortilla or divya oven toasted chips (topped with spray butter flavoring, cinnamon, Truvia OR spray butter, garlic powder, chili powder)    18 BBQ Popchips (available at Target, Whole Foods, Fresh Market)                   Diabetes Support Group Meetings         Date: Topic:   February 14 Eat Fit PAM/Health Promotion   March 14 Taking Care of Your Smile   April 11 Spring into Healthy Eating/Cooking Demo   May 9 Ease Your Mind with Diabetes   Mikala 13 Summer Treats/Cooking Demo   July 11 Eat Fit PAM/Super Market Sweep   August 8 Taking Care of Your Eyes and Feet   Sept 12 Technology/ADA updates   October 10 Recipes & Treats/Cooking Demo   November 14 Heart Health/Pump it up!   December 12 Year-End Close Out        Meetings are held in the [facility] (A) of the [facility] located at [address] [place] [address]. Please call [phone] for additional information.    Free service, offered every 2nd Thursday of every month! Family members and/or friends are welcome as well!  Support group is for patients with type 1 or type 2 diabetes.    From 3:30p to 4:30p

## 2019-09-23 NOTE — PROGRESS NOTES
CC: Patient is here for management of Type 1 diabetes and review of medical conditions as listed in visit diagnosis.      HPI: Mr. Owen Radford is a 59 y.o. White male who was diagnosed with SUSAN/Type 1  DM x 25 years ago, seen by Dr. Sheffield in 2016.    Last seen by me in August 2018 and is now being seen by me again today.  In the past on 630 g (medtronic) 2017--interested in 670g.     Lab Results   Component Value Date    HGBA1C 7.1 (H) 03/13/2019       Has h/o severe hypoglycemia, 40 -50 mg/dl  H/o nocturnal hypoglycemia  Has hypoglycemia unawareness     Has h/o hypothyroidism, last tsh, t4 free  Lab Results   Component Value Date    TSH 1.627 03/13/2019   needs repeat, on lt4 225 mcg daily       CURRENT DIABETIC MEDS: novolog w/ insulin pump     Diabetes Management Status    Statin: Taking  ACE/ARB: Taking    Screening or Prevention Patient's value Goal Complete/Controlled?   HgA1C Testing and Control   Lab Results   Component Value Date    HGBA1C 7.1 (H) 03/13/2019      Annually/Less than 8% Yes   Lipid profile : 03/13/2019 Annually Yes   LDL control Lab Results   Component Value Date    LDLCALC 87.8 03/13/2019    Annually/Less than 100 mg/dl  Yes   Nephropathy screening Lab Results   Component Value Date    LABMICR 6.0 03/13/2019     No results found for: PROTEINUA Annually Yes   Blood pressure BP Readings from Last 1 Encounters:   09/23/19 122/70    Less than 140/90 Yes   Dilated retinal exam : 03/01/2016 Annually No   Foot exam   : 03/13/2019 Annually Yes   DR-annually April 2019  [facility]     REVIEW OF SYSTEMS  General: no weakness, fatigue, +weight changes #4 (loss)  Eyes: no double or blurred vision, eye pain, or redness   Cardiovascular: no chest pain, palpitations, edema, or murmurs.   Respiratory: no cough or dyspnea.   GI: no heartburn, nausea, or changes in bowel patterns; good appetite.   Skin: no rashes, dryness, itching, or reactions at insulin injection sites.   Neuro: no  "numbness, tingling, tremors, or vertigo.   Endocrine: no polyuria, polydipsia, polyphagia, heat or cold intolerance.     Vital Signs  /70   Ht 5' 10" (1.778 m)   Wt 94.8 kg (209 lb)   BMI 29.99 kg/m²     Hemoglobin A1C   Date Value Ref Range Status   03/13/2019 7.1 (H) 4.0 - 5.6 % Final     Comment:     ADA Screening Guidelines:  5.7-6.4%  Consistent with prediabetes  >or=6.5%  Consistent with diabetes  High levels of fetal hemoglobin interfere with the HbA1C  assay. Heterozygous hemoglobin variants (HbS, HgC, etc)do  not significantly interfere with this assay.   However, presence of multiple variants may affect accuracy.     08/08/2018 6.8 (H) 4.0 - 5.6 % Final     Comment:     ADA Screening Guidelines:  5.7-6.4%  Consistent with prediabetes  >or=6.5%  Consistent with diabetes  High levels of fetal hemoglobin interfere with the HbA1C  assay. Heterozygous hemoglobin variants (HbS, HgC, etc)do  not significantly interfere with this assay.   However, presence of multiple variants may affect accuracy.     11/30/2017 7.2 (H) 4.0 - 5.6 % Final     Comment:     According to ADA guidelines, hemoglobin A1c <7.0% represents  optimal control in non-pregnant diabetic patients. Different  metrics may apply to specific patient populations.   Standards of Medical Care in Diabetes-2016.  For the purpose of screening for the presence of diabetes:  <5.7%     Consistent with the absence of diabetes  5.7-6.4%  Consistent with increasing risk for diabetes   (prediabetes)  >or=6.5%  Consistent with diabetes  Currently, no consensus exists for use of hemoglobin A1c  for diagnosis of diabetes for children.  This Hemoglobin A1c assay has significant interference with fetal   hemoglobin   (HbF). The results are invalid for patients with abnormal amounts of   HbF,   including those with known Hereditary Persistence   of Fetal Hemoglobin. Heterozygous hemoglobin variants (HbAS, HbAC,   HbAD, HbAE, HbA2) do not significantly " interfere with this assay;   however, presence of multiple variants in a sample may impact the %   interference.        Lab Results   Component Value Date    CREATININE 0.9 03/13/2019      Lab Results   Component Value Date    TSH 1.627 03/13/2019      Lab Results   Component Value Date    LDLCALC 87.8 03/13/2019        PHYSICAL EXAMINATION  Constitutional: Appears well, no distress  Neck: Supple, trachea midline.   Respiratory: No wheezes, even and unlabored.  Cardiovascular: RRR  Lymph: DP pulses, no edema.   Skin: warm and dry; no injection site reactions, no acanthosis nigracans observed.  Neuro:patient alert and cooperative, normal affect.    Diabetes Foot Exam:   deferred      Assessment/Plan  1. Type 1 diabetes mellitus with hyperglycemia  Hemoglobin A1c    Glucose, fasting    C-PEPTIDE    Comprehensive metabolic panel   2. Retinopathy     3. Obstructive sleep apnea     4. Hypothyroidism, unspecified type  TSH    T4, free   5. Hypoglycemia associated with diabetes     6. Hypertension associated with diabetes  Comprehensive metabolic panel   7. Dyslipidemia       1. F/u in 3-4 mos w/ me  New contact info given  On 670 g ([employer]) uses guardian sensor    Basal  mn-0630 1 u/hr  0630-10a 1.95 u/hr  10a-1p 1.6 u/hr  1p-7p 1.95 u/hr  7p-mn 1.6 u/hr  See media     Bolus  ICR 1:8.5 from 45  isf 40 from 50  Change target to  120 previously had   iob 2:45 (from 2:15)   Discussed calibrations at least 12 hours apart, avg 2-3 times a day      a1c goal less than 7% with minimal hypoglycemia  Time in range >70%-goal  Sensor wear >85% -goal  Less variability-reviewed SD    2. F/u with retinal specialist  q     3. Has done sleep studies  Cpap?  4.   Lab Results   Component Value Date    TSH 1.627 03/13/2019     Needs updated thyroid panel  Orders are in  On lt4 225 mcg daily  5. Has sensor (guardian) -medtronic  Auto mode > 80% -goal  AI ----basal/ISF/correction factor  Calibrations------q2-4 times a day?  6.  Controlled, continue med(s)-acei  7.   Lab Results   Component Value Date    LDLCALC 87.8 03/13/2019     At goal  On simvastatin 40 mg qhs     FOLLOW UP  Follow up in about 3 months (around 12/23/2019).     Orders Placed This Encounter   Procedures    Hemoglobin A1c     Standing Status:   Future     Standing Expiration Date:   11/21/2020    Glucose, fasting     Standing Status:   Future     Standing Expiration Date:   11/21/2020    C-PEPTIDE     Standing Status:   Future     Standing Expiration Date:   11/21/2020    TSH     Standing Status:   Future     Standing Expiration Date:   11/21/2020    T4, free     Standing Status:   Future     Standing Expiration Date:   11/21/2020    Comprehensive metabolic panel     Standing Status:   Future     Standing Expiration Date:   11/21/2020

## 2019-09-25 ENCOUNTER — LAB VISIT (OUTPATIENT)
Dept: LAB | Facility: HOSPITAL | Age: 59
End: 2019-09-25
Attending: NURSE PRACTITIONER
Payer: COMMERCIAL

## 2019-09-25 DIAGNOSIS — E10.65 TYPE 1 DIABETES MELLITUS WITH HYPERGLYCEMIA: ICD-10-CM

## 2019-09-25 DIAGNOSIS — E03.9 HYPOTHYROIDISM, UNSPECIFIED TYPE: ICD-10-CM

## 2019-09-25 LAB
C PEPTIDE SERPL-MCNC: <0.08 NG/ML (ref 0.78–5.19)
ESTIMATED AVG GLUCOSE: 151 MG/DL (ref 68–131)
GLUCOSE SERPL-MCNC: 158 MG/DL (ref 70–110)
HBA1C MFR BLD HPLC: 6.9 % (ref 4–5.6)
T4 FREE SERPL-MCNC: 1.16 NG/DL (ref 0.71–1.51)
TSH SERPL DL<=0.005 MIU/L-ACNC: 0.38 UIU/ML (ref 0.4–4)
TSH SERPL DL<=0.005 MIU/L-ACNC: 0.38 UIU/ML (ref 0.4–4)

## 2019-09-25 PROCEDURE — 83036 HEMOGLOBIN GLYCOSYLATED A1C: CPT

## 2019-09-25 PROCEDURE — 84439 ASSAY OF FREE THYROXINE: CPT

## 2019-09-25 PROCEDURE — 36415 COLL VENOUS BLD VENIPUNCTURE: CPT | Mod: PN

## 2019-09-25 PROCEDURE — 84681 ASSAY OF C-PEPTIDE: CPT

## 2019-09-25 PROCEDURE — 82947 ASSAY GLUCOSE BLOOD QUANT: CPT

## 2019-09-25 PROCEDURE — 84443 ASSAY THYROID STIM HORMONE: CPT

## 2019-09-26 ENCOUNTER — TELEPHONE (OUTPATIENT)
Dept: INTERNAL MEDICINE | Facility: CLINIC | Age: 59
End: 2019-09-26

## 2019-09-26 NOTE — TELEPHONE ENCOUNTER
----- Message from MAGDA Bradley, GERALDP sent at 9/26/2019  9:03 AM CDT -----  Tell pt to cut 25 mcg pill in 1/2-levothyroxine (synthroid)  Total 212.5 mcg daily  Level is still on lower end  Thanks  Ninfa    Notified pt. (Per Ninfa above) Pt verbalized understanding.

## 2019-10-03 RX ORDER — INSULIN ASPART 100 [IU]/ML
INJECTION, SOLUTION INTRAVENOUS; SUBCUTANEOUS
Qty: 20 ML | Refills: 11 | Status: SHIPPED | OUTPATIENT
Start: 2019-10-03 | End: 2020-09-28 | Stop reason: SDUPTHER

## 2019-11-12 RX ORDER — ROPINIROLE 0.5 MG/1
TABLET, FILM COATED ORAL
Qty: 180 TABLET | Refills: 0 | Status: SHIPPED | OUTPATIENT
Start: 2019-11-12 | End: 2020-01-27 | Stop reason: SDUPTHER

## 2020-01-27 ENCOUNTER — PATIENT MESSAGE (OUTPATIENT)
Dept: NEUROLOGY | Facility: CLINIC | Age: 60
End: 2020-01-27

## 2020-01-28 DIAGNOSIS — G25.81 RLS (RESTLESS LEGS SYNDROME): Primary | ICD-10-CM

## 2020-01-28 RX ORDER — ROPINIROLE 0.5 MG/1
TABLET, FILM COATED ORAL
Qty: 180 TABLET | Refills: 0 | Status: SHIPPED | OUTPATIENT
Start: 2020-01-28 | End: 2020-01-28 | Stop reason: SDUPTHER

## 2020-01-28 RX ORDER — ROPINIROLE 0.5 MG/1
TABLET, FILM COATED ORAL
Qty: 180 TABLET | Refills: 0 | Status: SHIPPED | OUTPATIENT
Start: 2020-01-28 | End: 2020-04-13 | Stop reason: SDUPTHER

## 2020-03-16 ENCOUNTER — OFFICE VISIT (OUTPATIENT)
Dept: URGENT CARE | Facility: CLINIC | Age: 60
End: 2020-03-16
Payer: COMMERCIAL

## 2020-03-16 VITALS
TEMPERATURE: 99 F | SYSTOLIC BLOOD PRESSURE: 115 MMHG | OXYGEN SATURATION: 96 % | DIASTOLIC BLOOD PRESSURE: 67 MMHG | HEIGHT: 70 IN | RESPIRATION RATE: 17 BRPM | HEART RATE: 74 BPM | BODY MASS INDEX: 29.92 KG/M2 | WEIGHT: 209 LBS

## 2020-03-16 DIAGNOSIS — J06.9 VIRAL URI WITH COUGH: Primary | ICD-10-CM

## 2020-03-16 PROCEDURE — 99214 OFFICE O/P EST MOD 30 MIN: CPT | Mod: S$GLB,,, | Performed by: FAMILY MEDICINE

## 2020-03-16 PROCEDURE — 99214 PR OFFICE/OUTPT VISIT, EST, LEVL IV, 30-39 MIN: ICD-10-PCS | Mod: S$GLB,,, | Performed by: FAMILY MEDICINE

## 2020-03-16 RX ORDER — ATORVASTATIN CALCIUM 10 MG/1
TABLET, FILM COATED ORAL
COMMUNITY
Start: 2014-06-16 | End: 2020-06-25

## 2020-03-16 RX ORDER — BENZONATATE 200 MG/1
200 CAPSULE ORAL 3 TIMES DAILY PRN
Qty: 30 CAPSULE | Refills: 0 | Status: SHIPPED | OUTPATIENT
Start: 2020-03-16 | End: 2022-02-09

## 2020-03-16 NOTE — PROGRESS NOTES
"Subjective:       Patient ID: Owen Radford is a 59 y.o. male.    Vitals:  height is 5' 10" (1.778 m) and weight is 94.8 kg (209 lb). His temperature is 98.5 °F (36.9 °C). His blood pressure is 115/67 and his pulse is 74. His respiration is 17 and oxygen saturation is 96%.     Chief Complaint: Sinus Problem    Pt states sinus congestion and pressure with runny nose and cough since yesterday.  Has been taking NyQuil and cough drops.  No fever chills body aches shortness of breath wheezing. No recent travel -contact with anyone who has traveled- or contact with anyone positive for covid 19.  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 The current episode started yesterday. The problem is unchanged. There has been no fever. Associated symptoms include coughing, sinus pressure and a sore throat (Mild). Pertinent negatives include no chills, congestion, headaches or shortness of breath. Treatments tried: nyquil, cough drops.       Constitution: Negative for chills, fatigue and fever.   HENT: Positive for sinus pressure and sore throat (Mild). Negative for congestion.    Neck: Negative for painful lymph nodes.   Cardiovascular: Negative for chest pain and leg swelling.   Eyes: Negative for double vision and blurred vision.   Respiratory: Positive for cough and sputum production. Negative for shortness of breath.    Gastrointestinal: Negative for nausea, vomiting and diarrhea.   Genitourinary: Negative for dysuria, frequency and urgency.   Musculoskeletal: Negative for joint pain, joint swelling, muscle cramps and muscle ache.   Skin: Negative for color change, pale and rash.   Allergic/Immunologic: Negative for seasonal allergies.   Neurological: Negative for dizziness, history of vertigo, light-headedness, passing out and headaches.   Hematologic/Lymphatic: Negative for swollen lymph nodes, easy bruising/bleeding and history of blood clots. Does not bruise/bleed easily. "   Psychiatric/Behavioral: Negative for nervous/anxious, sleep disturbance and depression. The patient is not nervous/anxious.        Objective:      Physical Exam   Constitutional: He is oriented to person, place, and time. He appears well-developed and well-nourished. He is cooperative.  Non-toxic appearance. He does not have a sickly appearance. He does not appear ill. No distress.   HENT:   Head: Normocephalic and atraumatic.   Right Ear: Hearing, external ear and ear canal normal. A middle ear effusion (Clear) is present.   Left Ear: Hearing, external ear and ear canal normal. A middle ear effusion ( Clear) is present.   Nose: Nose normal. No mucosal edema, rhinorrhea or nasal deformity. No epistaxis. Right sinus exhibits no maxillary sinus tenderness and no frontal sinus tenderness. Left sinus exhibits no maxillary sinus tenderness and no frontal sinus tenderness.   Mouth/Throat: Uvula is midline, oropharynx is clear and moist and mucous membranes are normal. No trismus in the jaw. Normal dentition. No uvula swelling. No oropharyngeal exudate, posterior oropharyngeal edema or posterior oropharyngeal erythema.   Pts Mask was not removed to decrease any transmission of viruses, he does not have a sore throat no muffled voice  Sore throat mild, no difficulty swallowing   Eyes: Conjunctivae and lids are normal. No scleral icterus.   Neck: Trachea normal, full passive range of motion without pain and phonation normal. Neck supple. No neck rigidity. No edema and no erythema present.   Cardiovascular: Normal rate, regular rhythm, normal heart sounds, intact distal pulses and normal pulses.   Pulmonary/Chest: Effort normal and breath sounds normal. No accessory muscle usage. No tachypnea. No respiratory distress. He has no decreased breath sounds. He has no wheezes. He has no rhonchi. He has no rales.   Abdominal: Normal appearance.   Musculoskeletal: Normal range of motion. He exhibits no edema or deformity.    Neurological: He is alert and oriented to person, place, and time. He exhibits normal muscle tone. Coordination normal.   Skin: Skin is warm, dry, intact, not diaphoretic and not pale.   Psychiatric: He has a normal mood and affect. His speech is normal and behavior is normal. Judgment and thought content normal. Cognition and memory are normal.   Nursing note and vitals reviewed.        Assessment:       1. Viral URI with cough        Plan:         Viral URI with cough  -     benzonatate (TESSALON) 200 MG capsule; Take 1 capsule (200 mg total) by mouth 3 (three) times daily as needed for Cough.  Dispense: 30 capsule; Refill: 0    advised f/u for fever, sob, worsening cough pt agreeable, may need covid testing at that time.   He is T1DM, cbg 120 today    Patient Instructions   PLEASE READ YOUR DISCHARGE INSTRUCTIONS ENTIRELY AS IT CONTAINS IMPORTANT INFORMATION.      Please drink plenty of fluids.    Please get plenty of rest.    Please return here or go to the Emergency Department for any concerns or worsening of condition.    Please take an over the counter antihistamine medication (allegra/Claritin/Zyrtec) of your choice as directed.    Mucinex    If not allergic, please take over the counter Tylenol (Acetaminophen) and/or Motrin (Ibuprofen) as directed for control of pain and/or fever.  Please follow up with your primary care doctor or specialist as needed.    Sore throat recommendations: Warm fluids, warm salt water gargles, throat lozenges, tea, honey, soup, rest, hydration.    Use over the counter flonase: one spray each nostril twice daily OR two sprays each nostril once daily.     Sinus rinses DO NOT USE TAP WATER, if you must, water must be a rolling boil for 1 minute, let it cool, then use.  May use distilled water, or over the counter nasal saline rinses.  Vics vapor rub in shower to help open nasal passages.  May use nasal gel to keep passages moisturized.  May use Nasal saline sprays during the day  for added relief of congestion.   For those who go to the gym, please do not use the sauna or steam room now to clear sinuses.    If you  smoke, please stop smoking.      Please return or see your primary care doctor if you develop new or worsening symptoms.     Please arrange follow up with your primary medical clinic as soon as possible. You must understand that you've received an Urgent Care treatment only and that you may be released before all of your medical problems are known or treated. You, the patient, will arrange for follow up as instructed. If your symptoms worsen or fail to improve you should go to the Emergency Room.    Viral Upper Respiratory Illness (Adult)  You have a viral upper respiratory illness (URI), which is another term for the common cold. This illness is contagious during the first few days. It is spread through the air by coughing and sneezing. It may also be spread by direct contact (touching the sick person and then touching your own eyes, nose, or mouth). Frequent handwashing will decrease risk of spread. Most viral illnesses go away within 7 to 10 days with rest and simple home remedies. Sometimes the illness may last for several weeks. Antibiotics will not kill a virus, and they are generally not prescribed for this condition.    Home care  · If symptoms are severe, rest at home for the first 2 to 3 days. When you resume activity, don't let yourself get too tired.  · Avoid being exposed to cigarette smoke (yours or others).  · You may use acetaminophen or ibuprofen to control pain and fever, unless another medicine was prescribed. (Note: If you have chronic liver or kidney disease, have ever had a stomach ulcer or gastrointestinal bleeding, or are taking blood-thinning medicines, talk with your healthcare provider before using these medicines.) Aspirin should never be given to anyone under 18 years of age who is ill with a viral infection or fever. It may cause severe liver or  brain damage.  · Your appetite may be poor, so a light diet is fine. Avoid dehydration by drinking 6 to 8 glasses of fluids per day (water, soft drinks, juices, tea, or soup). Extra fluids will help loosen secretions in the nose and lungs.  · Over-the-counter cold medicines will not shorten the length of time youre sick, but they may be helpful for the following symptoms: cough, sore throat, and nasal and sinus congestion. (Note: Do not use decongestants if you have high blood pressure.)  Follow-up care  Follow up with your healthcare provider, or as advised.  When to seek medical advice  Call your healthcare provider right away if any of these occur:  · Cough with lots of colored sputum (mucus)  · Severe headache; face, neck, or ear pain  · Difficulty swallowing due to throat pain  · Fever of 100.4°F (38°C)  Call 911, or get immediate medical care  Call emergency services right away if any of these occur:  · Chest pain, shortness of breath, wheezing, or difficulty breathing  · Coughing up blood  · Inability to swallow due to throat pain  Date Last Reviewed: 9/13/2015  © 4917-1692 [employer]. [address] [place] [address], [place] [address]. All rights reserved. This information is not intended as a substitute for professional medical care. Always follow your healthcare professional's instructions.

## 2020-03-24 RX ORDER — LEVOTHYROXINE SODIUM 25 UG/1
TABLET ORAL
Qty: 90 TABLET | Refills: 1 | Status: SHIPPED | OUTPATIENT
Start: 2020-03-24 | End: 2022-02-09

## 2020-04-13 ENCOUNTER — PATIENT MESSAGE (OUTPATIENT)
Dept: NEUROLOGY | Facility: CLINIC | Age: 60
End: 2020-04-13

## 2020-04-13 DIAGNOSIS — G25.81 RLS (RESTLESS LEGS SYNDROME): ICD-10-CM

## 2020-04-13 RX ORDER — ROPINIROLE 0.5 MG/1
TABLET, FILM COATED ORAL
Qty: 180 TABLET | Refills: 0 | Status: SHIPPED | OUTPATIENT
Start: 2020-04-13 | End: 2021-02-23

## 2020-04-13 RX ORDER — ROPINIROLE 0.5 MG/1
TABLET, FILM COATED ORAL
Qty: 180 TABLET | Refills: 0 | Status: SHIPPED | OUTPATIENT
Start: 2020-04-13 | End: 2020-04-13 | Stop reason: SDUPTHER

## 2020-04-14 ENCOUNTER — PATIENT MESSAGE (OUTPATIENT)
Dept: INTERNAL MEDICINE | Facility: CLINIC | Age: 60
End: 2020-04-14

## 2020-04-14 RX ORDER — LEVOTHYROXINE SODIUM 200 UG/1
200 TABLET ORAL
Qty: 90 TABLET | Refills: 2 | Status: SHIPPED | OUTPATIENT
Start: 2020-04-14 | End: 2020-11-24

## 2020-05-22 ENCOUNTER — PATIENT MESSAGE (OUTPATIENT)
Dept: INTERNAL MEDICINE | Facility: CLINIC | Age: 60
End: 2020-05-22

## 2020-05-22 ENCOUNTER — TELEPHONE (OUTPATIENT)
Dept: INTERNAL MEDICINE | Facility: CLINIC | Age: 60
End: 2020-05-22

## 2020-05-22 NOTE — TELEPHONE ENCOUNTER
Pt states he needs blood work prior to appointment. Can you order blood work and I will call to schedule both appointments.

## 2020-05-22 NOTE — TELEPHONE ENCOUNTER
"----- Message from Serena Tamayo sent at 5/22/2020  3:41 PM CDT -----  Contact: [employer] Patient Portal Request  Name of Caller: Patient Portal Owen Radford   Reason for Visit/Symptoms: 6 month follow up  Best Contact Number or Confirm if [employer] Preferred: [employer]  Preferred Date/Time of Appointment: Anytime 6/9/2020 - 6/11/2020   Interested in Virtual Visit (yes/no): no  Additional Information: 'I need to have my blood work as well as a full check "    "

## 2020-05-25 ENCOUNTER — TELEPHONE (OUTPATIENT)
Dept: INTERNAL MEDICINE | Facility: CLINIC | Age: 60
End: 2020-05-25

## 2020-05-25 DIAGNOSIS — E10.65 TYPE 1 DIABETES MELLITUS WITH HYPERGLYCEMIA: Primary | ICD-10-CM

## 2020-05-25 DIAGNOSIS — E03.9 HYPOTHYROIDISM, UNSPECIFIED TYPE: ICD-10-CM

## 2020-05-25 DIAGNOSIS — G47.33 OBSTRUCTIVE SLEEP APNEA: ICD-10-CM

## 2020-05-25 RX ORDER — SIMVASTATIN 40 MG/1
TABLET, FILM COATED ORAL
Qty: 90 TABLET | Refills: 3 | Status: SHIPPED | OUTPATIENT
Start: 2020-05-25 | End: 2020-05-25 | Stop reason: SDUPTHER

## 2020-05-25 RX ORDER — LISINOPRIL 10 MG/1
TABLET ORAL
Qty: 90 TABLET | Refills: 3 | Status: SHIPPED | OUTPATIENT
Start: 2020-05-25 | End: 2020-05-25 | Stop reason: SDUPTHER

## 2020-06-02 ENCOUNTER — LAB VISIT (OUTPATIENT)
Dept: LAB | Facility: HOSPITAL | Age: 60
End: 2020-06-02
Attending: NURSE PRACTITIONER
Payer: COMMERCIAL

## 2020-06-02 DIAGNOSIS — E10.65 TYPE 1 DIABETES MELLITUS WITH HYPERGLYCEMIA: ICD-10-CM

## 2020-06-02 DIAGNOSIS — E03.9 HYPOTHYROIDISM, UNSPECIFIED TYPE: ICD-10-CM

## 2020-06-02 LAB
ALBUMIN SERPL BCP-MCNC: 3.6 G/DL (ref 3.5–5.2)
ALP SERPL-CCNC: 93 U/L (ref 55–135)
ALT SERPL W/O P-5'-P-CCNC: 23 U/L (ref 10–44)
ANION GAP SERPL CALC-SCNC: 6 MMOL/L (ref 8–16)
AST SERPL-CCNC: 21 U/L (ref 10–40)
BILIRUB SERPL-MCNC: 1 MG/DL (ref 0.1–1)
BUN SERPL-MCNC: 14 MG/DL (ref 6–20)
CALCIUM SERPL-MCNC: 8.7 MG/DL (ref 8.7–10.5)
CHLORIDE SERPL-SCNC: 103 MMOL/L (ref 95–110)
CHOLEST SERPL-MCNC: 146 MG/DL (ref 120–199)
CHOLEST/HDLC SERPL: 3.7 {RATIO} (ref 2–5)
CO2 SERPL-SCNC: 26 MMOL/L (ref 23–29)
CREAT SERPL-MCNC: 1 MG/DL (ref 0.5–1.4)
EST. GFR  (AFRICAN AMERICAN): >60 ML/MIN/1.73 M^2
EST. GFR  (NON AFRICAN AMERICAN): >60 ML/MIN/1.73 M^2
ESTIMATED AVG GLUCOSE: 148 MG/DL (ref 68–131)
GLUCOSE SERPL-MCNC: 168 MG/DL (ref 70–110)
HBA1C MFR BLD HPLC: 6.8 % (ref 4–5.6)
HDLC SERPL-MCNC: 40 MG/DL (ref 40–75)
HDLC SERPL: 27.4 % (ref 20–50)
LDLC SERPL CALC-MCNC: 85.4 MG/DL (ref 63–159)
NONHDLC SERPL-MCNC: 106 MG/DL
POTASSIUM SERPL-SCNC: 4.1 MMOL/L (ref 3.5–5.1)
PROT SERPL-MCNC: 6.5 G/DL (ref 6–8.4)
SODIUM SERPL-SCNC: 135 MMOL/L (ref 136–145)
TRIGL SERPL-MCNC: 103 MG/DL (ref 30–150)
TSH SERPL DL<=0.005 MIU/L-ACNC: 1.06 UIU/ML (ref 0.4–4)

## 2020-06-02 PROCEDURE — 84443 ASSAY THYROID STIM HORMONE: CPT

## 2020-06-02 PROCEDURE — 36415 COLL VENOUS BLD VENIPUNCTURE: CPT | Mod: PN

## 2020-06-02 PROCEDURE — 80053 COMPREHEN METABOLIC PANEL: CPT

## 2020-06-02 PROCEDURE — 80061 LIPID PANEL: CPT

## 2020-06-02 PROCEDURE — 83036 HEMOGLOBIN GLYCOSYLATED A1C: CPT

## 2020-06-08 ENCOUNTER — OFFICE VISIT (OUTPATIENT)
Dept: INTERNAL MEDICINE | Facility: CLINIC | Age: 60
End: 2020-06-08
Payer: COMMERCIAL

## 2020-06-08 DIAGNOSIS — E11.649 HYPOGLYCEMIA ASSOCIATED WITH DIABETES: ICD-10-CM

## 2020-06-08 DIAGNOSIS — E10.65 TYPE 1 DIABETES MELLITUS WITH HYPERGLYCEMIA: Primary | ICD-10-CM

## 2020-06-08 DIAGNOSIS — E03.9 HYPOTHYROIDISM, UNSPECIFIED TYPE: ICD-10-CM

## 2020-06-08 DIAGNOSIS — G47.33 OBSTRUCTIVE SLEEP APNEA: ICD-10-CM

## 2020-06-08 DIAGNOSIS — E11.59 HYPERTENSION ASSOCIATED WITH DIABETES: ICD-10-CM

## 2020-06-08 DIAGNOSIS — N52.9 ERECTILE DYSFUNCTION, UNSPECIFIED ERECTILE DYSFUNCTION TYPE: ICD-10-CM

## 2020-06-08 DIAGNOSIS — E78.5 DYSLIPIDEMIA: ICD-10-CM

## 2020-06-08 DIAGNOSIS — I15.2 HYPERTENSION ASSOCIATED WITH DIABETES: ICD-10-CM

## 2020-06-08 PROCEDURE — 99442 PR PHYSICIAN TELEPHONE EVALUATION 11-20 MIN: ICD-10-PCS | Mod: 95,,, | Performed by: NURSE PRACTITIONER

## 2020-06-08 PROCEDURE — 99442 PR PHYSICIAN TELEPHONE EVALUATION 11-20 MIN: CPT | Mod: 95,,, | Performed by: NURSE PRACTITIONER

## 2020-06-08 RX ORDER — TADALAFIL 5 MG/1
5 TABLET ORAL DAILY PRN
Qty: 30 TABLET | Refills: 3 | Status: SHIPPED | OUTPATIENT
Start: 2020-06-08 | End: 2020-06-12

## 2020-06-08 NOTE — PATIENT INSTRUCTIONS
Tadalafil tablets (Cialis)  What is this medicine?  TADALAFIL (roland DA la elana) is used to treat erection problems in men. It is also used for enlargement of the prostate gland in men, a condition called benign prostatic hyperplasia or BPH. This medicine improves urine flow and reduces BPH symptoms. This medicine can also treat both erection problems and BPH when they occur together.  How should I use this medicine?  Take this medicine by mouth with a glass of water. Follow the directions on the prescription label. You may take this medicine with or without meals. When this medicine is used for erection problems, your doctor may prescribe it to be taken once daily or as needed. If you are taking the medicine as needed, you may be able to have sexual activity 30 minutes after taking it and for up to 36 hours after taking it. Whether you are taking the medicine as needed or once daily, you should not take more than one dose per day. If you are taking this medicine for symptoms of benign prostatic hyperplasia (BPH) or to treat both BPH and an erection problem, take the dose once daily at about the same time each day. Do not take your medicine more often than directed.  Talk to your pediatrician regarding the use of this medicine in children. Special care may be needed.  What side effects may I notice from receiving this medicine?  Side effects that you should report to your doctor or health care professional as soon as possible:  · allergic reactions like skin rash, itching or hives, swelling of the face, lips, or tongue  · breathing problems  · changes in hearing  · changes in vision  · chest pain  · fast, irregular heartbeat  · prolonged or painful erection  · seizures  Side effects that usually do not require medical attention (report to your doctor or health care professional if they continue or are bothersome):  · back pain  · dizziness  · flushing  · headache  · indigestion  · muscle aches  · nausea  · stuffy or  runny nose  What may interact with this medicine?  Do not take this medicine with any of the following medications:  · nitrates like amyl nitrite, isosorbide dinitrate, isosorbide mononitrate, nitroglycerin  · other medicines for erectile dysfunction like avanafil, sildenafil, vardenafil  · other tadalafil products (Adcirca)  · riociguat  This medicine may also interact with the following medications:  · certain drugs for high blood pressure  · certain drugs for the treatment of HIV infection or AIDS  · certain drugs used for fungal or yeast infections, like fluconazole, itraconazole, ketoconazole, and voriconazole  · certain drugs used for seizures like carbamazepine, phenytoin, and phenobarbital  · grapefruit juice  · macrolide antibiotics like clarithromycin, erythromycin, troleandomycin  · medicines for prostate problems  · rifabutin, rifampin or rifapentine  What if I miss a dose?  If you are taking this medicine as needed for erection problems, this does not apply. If you miss a dose while taking this medicine once daily for an erection problem, benign prostatic hyperplasia, or both, take it as soon as you remember, but do not take more than one dose per day.  Where should I keep my medicine?  Keep out of the reach of children.  Store at room temperature between 15 and 30 degrees C (59 and 86 degrees F). Throw away any unused medicine after the expiration date.  What should I tell my health care provider before I take this medicine?  They need to know if you have any of these conditions:  · bleeding disorders  · eye or vision problems, including a rare inherited eye disease called retinitis pigmentosa  · anatomical deformation of the penis, Peyronie's disease, or history of priapism (painful and prolonged erection)  · heart disease, angina, a history of heart attack, irregular heart beats, or other heart problems  · high or low blood pressure  · history of blood diseases, like sickle cell anemia or  leukemia  · history of stomach bleeding  · kidney disease  · liver disease  · stroke  · an unusual or allergic reaction to tadalafil, other medicines, foods, dyes, or preservatives  · pregnant or trying to get pregnant  · breast-feeding  What should I watch for while using this medicine?  If you notice any changes in your vision while taking this drug, call your doctor or health care professional as soon as possible. Stop using this medicine and call your health care provider right away if you have a loss of sight in one or both eyes.  Contact your doctor or health care professional right away if the erection lasts longer than 4 hours or if it becomes painful. This may be a sign of serious problem and must be treated right away to prevent permanent damage.  If you experience symptoms of nausea, dizziness, chest pain or arm pain upon initiation of sexual activity after taking this medicine, you should refrain from further activity and call your doctor or health care professional as soon as possible.  Do not drink alcohol to excess (examples, 5 glasses of wine or 5 shots of whiskey) when taking this medicine. When taken in excess, alcohol can increase your chances of getting a headache or getting dizzy, increasing your heart rate or lowering your blood pressure.  Using this medicine does not protect you or your partner against HIV infection (the virus that causes AIDS) or other sexually transmitted diseases.  NOTE:This sheet is a summary. It may not cover all possible information. If you have questions about this medicine, talk to your doctor, pharmacist, or health care provider. Copyright© 2017 [employer]        Hypoglycemia (Low Blood Sugar)     Fast-acting sugar includes a cup of nonfat milk.     Too little sugar (glucose) in your blood is called hypoglycemia or low blood sugar. Low blood sugar usually means anything lower than 70 mg/dL. Talk with your healthcare provider about your target range and what level  is too low for you. Diabetes itself doesnt cause low blood sugar. But some of the treatments for diabetes, such as pills or insulin, may raise your risk for it. Low blood sugar may cause you to pass out or have a seizure. So always treat low blood sugar right away, but don't overeat.  Special note: Always carry a source of fast-acting sugar and a snack in case of hypoglycemia.   What you may notice  If you have low blood sugar, you may have one or more of these symptoms:  · Shakiness or dizziness  · Cold, clammy skin or sweating  · Feelings of hunger  · Headache  · Nervousness  · A hard, fast heartbeat  · Weakness  · Confusion or irritability  · Blurred vision  · Having nightmares or waking up confused or sweating  · Numbness or tingling in the lips or tongue  What you should do  Here are tips to follow if you have hypoglycemia:   · First check your blood sugar. If it is too low (out of your target range), eat or drink 15 to 20 grams of fast-acting sugar. This may be 3 to 4 glucose tablets, 4 ounces (half a cup) of fruit juice or regular (nondiet) soda, 8 ounces (1 cup) of fat-free milk, or 1 tablespoon of honey. Dont take more than this, or your blood sugar may go too high.  · Wait 15 minutes. Then recheck your blood sugar if you can.  · If your blood sugar is still too low, repeat the steps above and check your blood sugar again. If your blood sugar still has not returned to your target range, contact your healthcare provider or seek emergency care.  · Once your blood sugar returns to target range, eat a snack or meal.  Preventing low blood sugar  Things you can do include the following:   · If your condition needs a strict treatment plan, eat your meals and snacks at the same times each day. Dont skip meals!  · If your treatment plan lets you change when you eat and what you eat, learn how to change the time and dose of your rapid-acting insulin to match this.   · Ask your healthcare provider if it is safe for  you to drink alcohol. Never drink on an empty stomach.  · Take your medicine at the prescribed times.  · Always carry a source of fast-acting sugar and a snack when youre away from home.  Other things to do  Additional tips include the following:  · Carry a medical ID card, a compact USB drive, or wear a medical alert bracelet or necklace. It should say that you have diabetes. It should also say what to do if you pass out or have a seizure.  · Make sure your family, friends, and coworkers know the signs of low blood sugar. Tell them what to do if your blood sugar falls very low and you cant treat yourself.  · Keep a glucagon emergency kit handy. Be sure your family, friends, and coworkers know how and when to use it. Check it regularly and replace the glucagon before it expires.  · Talk with your health care team about other things you can do to prevent low blood sugar.     If you have unexplained hypoglycemia or hypoglycemia several times, call your healthcare provider.   Date Last Reviewed: 5/1/2016 © 2000-2017 [employer]. [address] [place] [address], [place] [address]. All rights reserved. This information is not intended as a substitute for professional medical care. Always follow your healthcare professional's instructions.

## 2020-06-08 NOTE — PROGRESS NOTES
Established Patient - Audio Only Telehealth Visit     The patient location is: home   The chief complaint leading to consultation is: type 1 dm  Visit type: Virtual visit with audio only (telephone)  Total time spent with patient: 15 mins   Call wife's phone      Last visit--- 9/2019       The reason for the audio only service rather than synchronous audio and video virtual visit was related to technical difficulties or patient preference/necessity.     Each patient to whom I provide medical services by telemedicine is:  (1) informed of the relationship between the physician and patient and the respective role of any other health care provider with respect to management of the patient; and (2) notified that they may decline to receive medical services by telemedicine and may withdraw from such care at any time. Patient verbally consented to receive this service via voice-only telephone call.    Reviewed labs    Basal  mn-0800 1 u/hr  0800-3p 2.10 u/hr  3p-7p 1.95 u/hr  7p-mn 2 u/hr  Changes    Bolus  ICR 1:8.5  isf 40-45   target  120   iob 2:45        Lab Results   Component Value Date    TSH 1.059 06/02/2020     Lab Results   Component Value Date    HGBA1C 6.8 (H) 06/02/2020       HPI: Type 1 DM      Assessment and plan:      1. Type 1 diabetes mellitus with hyperglycemia  Hemoglobin A1C    Basic metabolic panel   2. Hypoglycemia associated with diabetes     3. Obstructive sleep apnea     4. Hypothyroidism, unspecified type  TSH   5. Hypertension associated with diabetes     6. Dyslipidemia     7. Erectile dysfunction, unspecified erectile dysfunction type        1.-2. F/u in 6 mos   a1c bmp tsh  Reviewed labs   Doing well!  Has all supplies   3. Stable  4.   Lab Results   Component Value Date    TSH 1.059 06/02/2020     Stable with lt4   5. Continue med(s)  6.   Lab Results   Component Value Date    LDLCALC 85.4 06/02/2020     At goal   7. cialis 5 mg daily prn   30#        This service was not  originating from a related E/M service provided within the previous 7 days nor will  to an E/M service or procedure within the next 24 hours or my soonest available appointment.  Prevailing standard of care was able to be met in this audio-only visit.

## 2020-06-12 RX ORDER — TADALAFIL 5 MG/1
5 TABLET ORAL DAILY
Qty: 30 TABLET | Refills: 6 | Status: SHIPPED | OUTPATIENT
Start: 2020-06-12 | End: 2020-10-22

## 2020-06-25 ENCOUNTER — OFFICE VISIT (OUTPATIENT)
Dept: CARDIOLOGY | Facility: CLINIC | Age: 60
End: 2020-06-25
Payer: COMMERCIAL

## 2020-06-25 VITALS
DIASTOLIC BLOOD PRESSURE: 60 MMHG | HEIGHT: 70 IN | BODY MASS INDEX: 29.73 KG/M2 | HEART RATE: 71 BPM | SYSTOLIC BLOOD PRESSURE: 140 MMHG | WEIGHT: 207.69 LBS | OXYGEN SATURATION: 96 %

## 2020-06-25 DIAGNOSIS — I10 ESSENTIAL HYPERTENSION: Primary | ICD-10-CM

## 2020-06-25 DIAGNOSIS — E78.2 MIXED HYPERLIPIDEMIA: ICD-10-CM

## 2020-06-25 DIAGNOSIS — E10.65 TYPE 1 DIABETES MELLITUS WITH HYPERGLYCEMIA: ICD-10-CM

## 2020-06-25 DIAGNOSIS — F17.200 SMOKER: ICD-10-CM

## 2020-06-25 DIAGNOSIS — G47.33 OSA (OBSTRUCTIVE SLEEP APNEA): ICD-10-CM

## 2020-06-25 DIAGNOSIS — G47.33 OBSTRUCTIVE SLEEP APNEA: ICD-10-CM

## 2020-06-25 PROCEDURE — 99204 OFFICE O/P NEW MOD 45 MIN: CPT | Mod: S$GLB,,, | Performed by: INTERNAL MEDICINE

## 2020-06-25 PROCEDURE — 93010 EKG 12-LEAD: ICD-10-PCS | Mod: S$GLB,,, | Performed by: INTERNAL MEDICINE

## 2020-06-25 PROCEDURE — 99999 PR PBB SHADOW E&M-EST. PATIENT-LVL V: CPT | Mod: PBBFAC,,, | Performed by: INTERNAL MEDICINE

## 2020-06-25 PROCEDURE — 93010 ELECTROCARDIOGRAM REPORT: CPT | Mod: S$GLB,,, | Performed by: INTERNAL MEDICINE

## 2020-06-25 PROCEDURE — 99999 PR PBB SHADOW E&M-EST. PATIENT-LVL V: ICD-10-PCS | Mod: PBBFAC,,, | Performed by: INTERNAL MEDICINE

## 2020-06-25 PROCEDURE — 93005 ELECTROCARDIOGRAM TRACING: CPT | Mod: S$GLB,,, | Performed by: INTERNAL MEDICINE

## 2020-06-25 PROCEDURE — 93005 EKG 12-LEAD: ICD-10-PCS | Mod: S$GLB,,, | Performed by: INTERNAL MEDICINE

## 2020-06-25 PROCEDURE — 99204 PR OFFICE/OUTPT VISIT, NEW, LEVL IV, 45-59 MIN: ICD-10-PCS | Mod: S$GLB,,, | Performed by: INTERNAL MEDICINE

## 2020-06-25 RX ORDER — ASPIRIN 81 MG/1
81 TABLET ORAL DAILY
Qty: 1 TABLET | Refills: 0 | Status: SHIPPED | OUTPATIENT
Start: 2020-06-25

## 2020-06-25 NOTE — LETTER
June 25, 2020      MAGDA Bradley, FNP  [address]  [facility] [address]           [facility] - Cardiology  [address] [facility] [address]  Phone: [phone]  Fax: [phone]          Patient: Owen Radford   MR Number: [id number]   YOB: 1960   Date of Visit: 6/25/2020       Dear Ninfa Shelby:    Thank you for referring Owen Radford to me for evaluation. Attached you will find relevant portions of my assessment and plan of care.    If you have questions, please do not hesitate to call me. I look forward to following Owen Radford along with you.    Sincerely,    Bradly Gutierres MD    Enclosure  CC:  No Recipients    If you would like to receive this communication electronically, please contact [email or web address] or [phone] to request more information on "[employer] Link access.    For providers and/or their staff who would like to refer a patient to [facility], please contact us through our one-stop-shop provider referral line, [facility], at [phone].    If you feel you have received this communication in error or would no longer like to receive these types of communications, please e-mail [email or web address]

## 2020-06-25 NOTE — PROGRESS NOTES
"Subjective:    Patient ID:  Owen Radford is a 59 y.o. male who presents for evaluation of Hypertension      HPI     The patient is a 59 year old male followed by Primary care with type 2 diabetes, NELIA, hypertension, hyperlipidemia and ED. He has been urged by family for for CV evaluation. He was evaluated 5 years ago at [facility] for a "blood clott" in his left neck . He apparently had a LHC at the time. Currently he denies exertional chest pain or SARAVIA. He is not exercising and smokes 1 cigar a day and social alcohol use.  Lab Results   Component Value Date     (L) 06/02/2020    K 4.1 06/02/2020     06/02/2020    CO2 26 06/02/2020    BUN 14 06/02/2020    CREATININE 1.0 06/02/2020     (H) 06/02/2020    HGBA1C 6.8 (H) 06/02/2020    MG 2.6 08/24/2011    AST 21 06/02/2020    ALT 23 06/02/2020    ALBUMIN 3.6 06/02/2020    PROT 6.5 06/02/2020    BILITOT 1.0 06/02/2020    WBC 10.40 08/24/2011    HGB 14.9 08/24/2011    HCT 43.6 08/24/2011    MCV 96.2 (H) 08/24/2011     08/24/2011    TSH 1.059 06/02/2020         Lab Results   Component Value Date    CHOL 146 06/02/2020    HDL 40 06/02/2020    TRIG 103 06/02/2020       Lab Results   Component Value Date    LDLCALC 85.4 06/02/2020       Past Medical History:   Diagnosis Date    Diabetes mellitus     DM (diabetes mellitus), type 1, uncontrolled with complications 7/26/2016    Fatigue     Hypertension     Hypothyroidism     Sleep apnea     Type 1 diabetes mellitus with hyperglycemia 8/14/2018       Current Outpatient Medications:     glucagon, human recombinant, (GLUCAGON EMERGENCY KIT, HUMAN,) 1 mg SolR, Inject 1 mg into the muscle as needed., Disp: 1 each, Rfl: 3    levothyroxine (SYNTHROID) 200 MCG tablet, Take 1 tablet (200 mcg total) by mouth before breakfast., Disp: 90 tablet, Rfl: 2    lisinopriL 10 MG tablet, Take 1 tablet (10 mg total) by mouth once daily., Disp: 90 tablet, Rfl: 3    NOVOLOG U-100 INSULIN ASPART 100 unit/mL " injection, INJECT 90 UNITS EVERY DAY VIA INSULIN PUMP, Disp: 20 mL, Rfl: 11    rOPINIRole (REQUIP) 0.5 MG tablet, TAKE 1 TO 2 TABLETS BY MOUTH DAILY IN THE EVENING, Disp: 180 tablet, Rfl: 0    simvastatin (ZOCOR) 40 MG tablet, Take 1 tablet (40 mg total) by mouth once daily., Disp: 90 tablet, Rfl: 3    tadalafiL (CIALIS) 5 MG tablet, Take 1 tablet (5 mg total) by mouth once daily., Disp: 30 tablet, Rfl: 6    AFLURIA QUAD 5321-6530, PF, 60 mcg (15 mcg x 4)/0.5 mL Syrg vaccine, TO BE ADMINISTERED BY PHARMACIST FOR IMMUNIZATION, Disp: , Rfl: 0    aspirin (ECOTRIN) 81 MG EC tablet, Take 1 tablet (81 mg total) by mouth once daily., Disp: 1 tablet, Rfl: 0    benzonatate (TESSALON) 200 MG capsule, Take 1 capsule (200 mg total) by mouth 3 (three) times daily as needed for Cough. (Patient not taking: Reported on 6/25/2020), Disp: 30 capsule, Rfl: 0    levothyroxine (SYNTHROID) 25 MCG tablet, TAKE 1 TABLET BY MOUTH EVERY DAY, Disp: 90 tablet, Rfl: 1          Review of Systems   Constitution: Negative for decreased appetite, diaphoresis, fever, malaise/fatigue, weight gain and weight loss.   HENT: Negative for congestion, ear discharge, ear pain and nosebleeds.    Eyes: Negative for blurred vision, double vision and visual disturbance.   Cardiovascular: Negative for chest pain, claudication, cyanosis, dyspnea on exertion, irregular heartbeat, leg swelling, near-syncope, orthopnea, palpitations, paroxysmal nocturnal dyspnea and syncope.   Respiratory: Negative for cough, hemoptysis, shortness of breath, sleep disturbances due to breathing, snoring, sputum production and wheezing.    Endocrine: Negative for polydipsia, polyphagia and polyuria.   Hematologic/Lymphatic: Negative for adenopathy and bleeding problem. Does not bruise/bleed easily.   Skin: Negative for color change, nail changes, poor wound healing and rash.   Musculoskeletal: Negative for muscle cramps and muscle weakness.   Gastrointestinal: Negative for  "abdominal pain, anorexia, change in bowel habit, hematochezia, nausea and vomiting.   Genitourinary: Negative for dysuria, frequency and hematuria.   Neurological: Negative for brief paralysis, difficulty with concentration, excessive daytime sleepiness, dizziness, focal weakness, headaches, light-headedness, seizures, vertigo and weakness.   Psychiatric/Behavioral: Negative for altered mental status and depression.   Allergic/Immunologic: Negative for persistent infections.        Objective:BP (!) 140/60   Pulse 71   Ht 5' 10" (1.778 m)   Wt 94.2 kg (207 lb 10.8 oz)   SpO2 96%   BMI 29.80 kg/m²             Physical Exam   Constitutional: He is oriented to person, place, and time. He appears well-developed and well-nourished.   HENT:   Head: Normocephalic.   Right Ear: External ear normal.   Left Ear: External ear normal.   Nose: Nose normal.   Inspection of lips, teeth and gums normal   Eyes: Pupils are equal, round, and reactive to light. EOM are normal. No scleral icterus.   Neck: Normal range of motion. Neck supple. No JVD present. No tracheal deviation present. No thyromegaly present.   Cardiovascular: Normal rate, regular rhythm and intact distal pulses. Exam reveals no gallop and no friction rub.   No murmur heard.  Pulses:       Carotid pulses are 2+ on the right side and 2+ on the left side.       Dorsalis pedis pulses are 2+ on the right side and 2+ on the left side.        Posterior tibial pulses are 2+ on the right side and 2+ on the left side.   Pulmonary/Chest: Effort normal and breath sounds normal.   Abdominal: Bowel sounds are normal. He exhibits no distension. There is no hepatosplenomegaly. There is no abdominal tenderness. There is no guarding.   Musculoskeletal: Normal range of motion.         General: No tenderness or edema.   Lymphadenopathy:   Palpation of neck and groin lymph nodes normal   Neurological: He is alert and oriented to person, place, and time. No cranial nerve deficit. He " exhibits normal muscle tone. Coordination normal.   Skin: Skin is dry.   Palpation of skin normal   Psychiatric: His behavior is normal. Judgment and thought content normal.         Assessment:       1. Essential hypertension    2. Type 1 diabetes mellitus with hyperglycemia    3. Obstructive sleep apnea    4. NELIA (obstructive sleep apnea)    5. Smoker    6. Mixed hyperlipidemia         Plan:       Owen was seen today for hypertension.    Diagnoses and all orders for this visit:    Essential hypertension  -     IN OFFICE EKG 12-LEAD (to Muse)    Type 1 diabetes mellitus with hyperglycemia  -     Ambulatory referral/consult to Cardiology    Obstructive sleep apnea  -     Ambulatory referral/consult to Cardiology    NELIA (obstructive sleep apnea)    Smoker    Mixed hyperlipidemia  -     Lipid Panel; Future; Expected date: 12/25/2020    Other orders  -     aspirin (ECOTRIN) 81 MG EC tablet; Take 1 tablet (81 mg total) by mouth once daily.

## 2020-06-30 ENCOUNTER — CLINICAL SUPPORT (OUTPATIENT)
Dept: URGENT CARE | Facility: CLINIC | Age: 60
End: 2020-06-30
Payer: COMMERCIAL

## 2020-06-30 VITALS — TEMPERATURE: 98 F

## 2020-06-30 DIAGNOSIS — Z11.59 ENCOUNTER FOR SCREENING FOR OTHER VIRAL DISEASES: Primary | ICD-10-CM

## 2020-06-30 PROCEDURE — U0003 INFECTIOUS AGENT DETECTION BY NUCLEIC ACID (DNA OR RNA); SEVERE ACUTE RESPIRATORY SYNDROME CORONAVIRUS 2 (SARS-COV-2) (CORONAVIRUS DISEASE [COVID-19]), AMPLIFIED PROBE TECHNIQUE, MAKING USE OF HIGH THROUGHPUT TECHNOLOGIES AS DESCRIBED BY CMS-2020-01-R: HCPCS

## 2020-06-30 PROCEDURE — 99211 PR OFFICE/OUTPT VISIT, EST, LEVL I: ICD-10-PCS | Mod: S$GLB,,, | Performed by: NURSE PRACTITIONER

## 2020-06-30 PROCEDURE — 99211 OFF/OP EST MAY X REQ PHY/QHP: CPT | Mod: S$GLB,,, | Performed by: NURSE PRACTITIONER

## 2020-07-05 LAB — SARS-COV-2 RNA RESP QL NAA+PROBE: NEGATIVE

## 2020-09-28 DIAGNOSIS — E10.65 TYPE 1 DIABETES MELLITUS WITH HYPERGLYCEMIA: Primary | ICD-10-CM

## 2020-09-28 RX ORDER — INSULIN ASPART 100 [IU]/ML
INJECTION, SOLUTION INTRAVENOUS; SUBCUTANEOUS
Qty: 20 ML | Refills: 11 | Status: SHIPPED | OUTPATIENT
Start: 2020-09-28 | End: 2021-02-01

## 2020-09-30 ENCOUNTER — PATIENT OUTREACH (OUTPATIENT)
Dept: ADMINISTRATIVE | Facility: HOSPITAL | Age: 60
End: 2020-09-30

## 2020-09-30 NOTE — PROGRESS NOTES
Sent patient a portal msg to see if he has a PCP.  Suzie Ha LPN  Lead Clinical Care Coordinator   [facility]

## 2020-11-11 ENCOUNTER — TELEPHONE (OUTPATIENT)
Dept: INTERNAL MEDICINE | Facility: CLINIC | Age: 60
End: 2020-11-11

## 2020-11-11 NOTE — TELEPHONE ENCOUNTER
----- Message from Hector Clancy sent at 11/10/2020  4:37 PM CST -----  Regarding: CMN  Mar with [employer] called requesting a call for pt CMN that was faxed over on 11-06-20       Mar [phone] ( option #2 )

## 2020-11-23 ENCOUNTER — OFFICE VISIT (OUTPATIENT)
Dept: URGENT CARE | Facility: CLINIC | Age: 60
End: 2020-11-23
Payer: COMMERCIAL

## 2020-11-23 VITALS
TEMPERATURE: 98 F | BODY MASS INDEX: 28.63 KG/M2 | OXYGEN SATURATION: 98 % | HEART RATE: 66 BPM | WEIGHT: 200 LBS | RESPIRATION RATE: 18 BRPM | SYSTOLIC BLOOD PRESSURE: 142 MMHG | DIASTOLIC BLOOD PRESSURE: 72 MMHG | HEIGHT: 70 IN

## 2020-11-23 DIAGNOSIS — L03.011 PARONYCHIA OF FINGER OF RIGHT HAND: Primary | ICD-10-CM

## 2020-11-23 PROCEDURE — 99214 PR OFFICE/OUTPT VISIT, EST, LEVL IV, 30-39 MIN: ICD-10-PCS | Mod: 25,S$GLB,, | Performed by: NURSE PRACTITIONER

## 2020-11-23 PROCEDURE — 26010 INCISION & DRAINAGE: ICD-10-PCS | Mod: S$GLB,,, | Performed by: NURSE PRACTITIONER

## 2020-11-23 PROCEDURE — 26010 DRAINAGE OF FINGER ABSCESS: CPT | Mod: S$GLB,,, | Performed by: NURSE PRACTITIONER

## 2020-11-23 PROCEDURE — 99214 OFFICE O/P EST MOD 30 MIN: CPT | Mod: 25,S$GLB,, | Performed by: NURSE PRACTITIONER

## 2020-11-23 RX ORDER — SULFAMETHOXAZOLE AND TRIMETHOPRIM 800; 160 MG/1; MG/1
1 TABLET ORAL 2 TIMES DAILY
Qty: 20 TABLET | Refills: 0 | Status: SHIPPED | OUTPATIENT
Start: 2020-11-23 | End: 2020-12-03

## 2020-11-23 RX ORDER — MUPIROCIN 20 MG/G
OINTMENT TOPICAL
Qty: 22 G | Refills: 1 | Status: SHIPPED | OUTPATIENT
Start: 2020-11-23 | End: 2022-11-29 | Stop reason: CLARIF

## 2020-11-23 RX ORDER — HYDROCODONE BITARTRATE AND ACETAMINOPHEN 5; 325 MG/1; MG/1
1 TABLET ORAL EVERY 6 HOURS PRN
Qty: 5 TABLET | Refills: 0 | Status: SHIPPED | OUTPATIENT
Start: 2020-11-23 | End: 2022-02-09

## 2020-11-23 NOTE — PROGRESS NOTES
"Subjective:       Patient ID: Owen Radford is a 60 y.o. male.    Vitals:  height is 5' 10" (1.778 m) and weight is 90.7 kg (200 lb). His temperature is 98.3 °F (36.8 °C). His blood pressure is 142/72 (abnormal) and his pulse is 66. His respiration is 18 and oxygen saturation is 98%.     Chief Complaint: Abscess (r 3rd finger)    Pt presents with an abscess on his right 3rd finger that has worsened over the past 2 weeks. Abscess is red, swollen, and warm to touch. Pt complains of pain upon palpation. Pt has used otc ointment with little relief.     Abscess  Chronicity:  NewProgression Since Onset: gradually worsening  Location:  Finger  Associated Symptoms: no fever, no chills, no sweats  Characteristics: painful, redness, swelling and blistering    Pain Scale:  4/10  Treatments Tried:  Topical antibiotics  Relieved by:  Nothing      Constitution: Negative for chills and fever.   HENT: Negative for facial swelling and sore throat.    Neck: Negative for painful lymph nodes.   Eyes: Negative for eye itching and eyelid swelling.   Respiratory: Negative for cough.    Musculoskeletal: Negative for joint pain and joint swelling.   Skin: Positive for rash and abscess. Negative for color change, pale, wound, abrasion, laceration, lesion, skin thickening/induration, puncture wound, erythema, avulsion and hives.   Allergic/Immunologic: Negative for environmental allergies, immunocompromised state and hives.   Hematologic/Lymphatic: Negative for swollen lymph nodes.       Objective:      Physical Exam   Constitutional: He is oriented to person, place, and time. He appears well-developed. He is cooperative.  Non-toxic appearance. He does not appear ill. No distress.   HENT:   Head: Normocephalic and atraumatic.   Ears:   Right Ear: Hearing, tympanic membrane, external ear and ear canal normal.   Left Ear: Hearing, tympanic membrane, external ear and ear canal normal.   Nose: Nose normal. No mucosal edema, rhinorrhea or " nasal deformity. No epistaxis. Right sinus exhibits no maxillary sinus tenderness and no frontal sinus tenderness. Left sinus exhibits no maxillary sinus tenderness and no frontal sinus tenderness.   Mouth/Throat: Uvula is midline, oropharynx is clear and moist and mucous membranes are normal. No trismus in the jaw. Normal dentition. No uvula swelling. No posterior oropharyngeal erythema.   Eyes: Conjunctivae and lids are normal. Right eye exhibits no discharge. Left eye exhibits no discharge. No scleral icterus.   Neck: Trachea normal, normal range of motion, full passive range of motion without pain and phonation normal. Neck supple.   Cardiovascular: Normal rate, regular rhythm, normal heart sounds and normal pulses.   Pulmonary/Chest: Effort normal and breath sounds normal. No respiratory distress.   Abdominal: Soft. Normal appearance and bowel sounds are normal. He exhibits no distension, no pulsatile midline mass and no mass. There is no abdominal tenderness.   Musculoskeletal: Normal range of motion.         General: No deformity.   Neurological: He is alert and oriented to person, place, and time. He exhibits normal muscle tone. Coordination normal.   Skin: Skin is warm, dry, intact, not diaphoretic and not pale. erythemaPsychiatric: His speech is normal and behavior is normal. Judgment and thought content normal.   Nursing note and vitals reviewed.        Assessment:       1. Paronychia of finger of right hand        Plan:         Paronychia of finger of right hand    Other orders  -     sulfamethoxazole-trimethoprim 800-160mg (BACTRIM DS) 800-160 mg Tab; Take 1 tablet by mouth 2 (two) times daily. for 10 days  Dispense: 20 tablet; Refill: 0  -     mupirocin (BACTROBAN) 2 % ointment; Apply to affected area 2 times daily  Dispense: 22 g; Refill: 1  -     HYDROcodone-acetaminophen (NORCO) 5-325 mg per tablet; Take 1 tablet by mouth every 6 (six) hours as needed for Pain.  Dispense: 5 tablet; Refill: 0

## 2020-11-23 NOTE — PROCEDURES
"Incision & Drainage    Date/Time: 11/23/2020 4:00 PM  Performed by: Celia Rodriguez NP  Authorized by: Celia Rodriguez NP     Time out: Immediately prior to procedure a "time out" was called to verify the correct patient, procedure, equipment, support staff and site/side marked as required.    Consent Done?:  Yes (Verbal)    Type:  Abscess  Body area:  Upper extremity  Location details:  Right long finger  Local anesthetic: topical anesthetic  Scalpel size: 18G.  Incision type:  Single straight  Complexity:  Simple  Drainage:  Pus  Drainage amount:  Moderate  Wound treatment:  Wound left open  Packing material:  None  Patient tolerance:  Patient tolerated the procedure well with no immediate complications      "

## 2020-11-23 NOTE — PATIENT INSTRUCTIONS
Urgent Care Management:  - Treatment plan discussed.  - PCP recommendations given.  - Return precautions advised.  - Patient agrees with and understands plan of care.    Patient Instructions, Education, Teaching and Summary of Visit:      RETURN TO CLINIC IF SYMPTOMS WORSEN OR CALL 911 IMMEDIATELY FOR SHORTNESS OF BREATH, CHEST PAIN, DIZZINESS, WORSENING PAIN, NAUSEA AND VOMITING, HEART PALPITATIONS, FEVER AND/OR NECK STIFFNESS. FOLLOW UP WITH PRIMARY CARE PROVIDER IN THE AM.    -Diagnosis and treatment plan discussed with patient.  -Patient agreed with my treatment plan.  -Patient will follow up with primary care provider or Specialty Provider, as discussed.     -If you were prescribed a narcotic or controlled medication, do not drive or operate heavy equipment or machinery while taking these medications.  -You must understand that you've received an Urgent Care treatment only and that you may be released before all your medical problems are known or treated.   -You, the patient, will arrange for follow up care as instructed.  -Follow up with your PCP or specialty clinic as directed in the next 1-2 weeks if not improved or as needed.    -You can call [phone] to schedule an appointment with the appropriate provider.  -If your condition worsens we recommend that you receive another evaluation at the emergency room immediately or contact your primary medical clinics after hours call service to discuss your concerns.  -Please return here or go to the Emergency Department for any concerns or worsening of condition.    Please arrange follow up with your primary medical clinic as soon as possible. You must understand that you've received an Urgent Care treatment only and that you may be released before all of your medical problems are known or treated. You, the patient, will arrange for follow up as instructed. If your symptoms worsen or fail to improve you should go to the Emergency Room.    WE CANNOT RULE OUT  ALL POSSIBLE CAUSES OF YOUR SYMPTOMS IN THE URGENT CARE SETTING PLEASE GO TO THE ER IF YOU FEELS YOUR CONDITION IS WORSENING OR YOU WOULD LIKE EMERGENT EVALUATION.     Please return here or go to the Emergency Department for any concerns or worsening of condition.  If you were prescribed antibiotics, please take them to completion.  If you were prescribed a narcotic medication, do not drive or operate heavy equipment or machinery while taking these medications.  Please follow up with your primary care doctor or specialist as needed.     If you  smoke, please stop smoking.  Paronychia of the Finger or Toe  Paronychia is an infection near a fingernail or toenail. It usually occurs when an opening in the cuticle or an ingrown toenail lets bacteria under the skin.  The infection will need to be drained if pus is present. If the infection has been caught early, you may need only antibiotic treatment. Healing will take about 1 to 2 weeks.  Home care  Follow these guidelines when caring for yourself at home:  · Clean and soak the toe or finger. Do this 2 times a day for the first 3 days. To do so:  ¨ Soak your foot or hand in a tub of warm water for 5 minutes. Or hold your toe or finger under a faucet of warm running water for 5 minutes.  ¨ Clean any crust away with soap and water using a cotton swab.  ¨ Put antibiotic ointment on the infected area.  · Change the dressing daily or any time it gets dirty.  · If you were given antibiotics, take them as directed until they are all gone.  · If your infection is on a toe, wear comfortable shoes with a lot of toe room. You can also wear open-toed sandals while your toe heals.  · You may use over-the-counter medicine (acetaminophen or ibuprofen to help with pain, unless another medicine was prescribed. If you have chronic liver or kidney disease, talk with your healthcare provider before using these medicines. Also talk with your provider if you've had a stomach ulcer or GI  (gastrointestinal) bleeding.  Prevention  The following can prevent paronychia:  · Avoid cutting or playing with your cuticles at home.  · Don't bite your nails.  · Don't suck on your thumbs or fingers.  Follow-up care  Follow up with your healthcare provider, or as advised.  When to seek medical advice  Call your healthcare provider right away if any of these occur:  · Redness, pain, or swelling of the finger or toe gets worse  · Red streaks in the skin leading away from the wound  · Pus or fluid draining from the nail area  · Fever of 100.4ºF (38ºC) or higher, or as directed by your provider  Date Last Reviewed: 8/1/2016  © 5491-7063 [email or web address]. [address] [place] [address], [place] [address]. All rights reserved. This information is not intended as a substitute for professional medical care. Always follow your healthcare professional's instructions.

## 2020-12-01 ENCOUNTER — LAB VISIT (OUTPATIENT)
Dept: LAB | Facility: HOSPITAL | Age: 60
End: 2020-12-01
Attending: NURSE PRACTITIONER
Payer: COMMERCIAL

## 2020-12-01 DIAGNOSIS — E03.9 HYPOTHYROIDISM, UNSPECIFIED TYPE: ICD-10-CM

## 2020-12-01 DIAGNOSIS — E10.65 TYPE 1 DIABETES MELLITUS WITH HYPERGLYCEMIA: ICD-10-CM

## 2020-12-01 LAB
ANION GAP SERPL CALC-SCNC: 6 MMOL/L (ref 8–16)
BUN SERPL-MCNC: 15 MG/DL (ref 8–23)
CALCIUM SERPL-MCNC: 8.6 MG/DL (ref 8.7–10.5)
CHLORIDE SERPL-SCNC: 102 MMOL/L (ref 95–110)
CO2 SERPL-SCNC: 28 MMOL/L (ref 23–29)
CREAT SERPL-MCNC: 1.2 MG/DL (ref 0.5–1.4)
EST. GFR  (AFRICAN AMERICAN): >60 ML/MIN/1.73 M^2
EST. GFR  (NON AFRICAN AMERICAN): >60 ML/MIN/1.73 M^2
ESTIMATED AVG GLUCOSE: 143 MG/DL (ref 68–131)
GLUCOSE SERPL-MCNC: 239 MG/DL (ref 70–110)
HBA1C MFR BLD HPLC: 6.6 % (ref 4–5.6)
POTASSIUM SERPL-SCNC: 4.5 MMOL/L (ref 3.5–5.1)
SODIUM SERPL-SCNC: 136 MMOL/L (ref 136–145)
T4 FREE SERPL-MCNC: 0.89 NG/DL (ref 0.71–1.51)
TSH SERPL DL<=0.005 MIU/L-ACNC: 6.85 UIU/ML (ref 0.4–4)

## 2020-12-01 PROCEDURE — 36415 COLL VENOUS BLD VENIPUNCTURE: CPT | Mod: PN

## 2020-12-01 PROCEDURE — 84443 ASSAY THYROID STIM HORMONE: CPT

## 2020-12-01 PROCEDURE — 84439 ASSAY OF FREE THYROXINE: CPT

## 2020-12-01 PROCEDURE — 80048 BASIC METABOLIC PNL TOTAL CA: CPT

## 2020-12-01 PROCEDURE — 83036 HEMOGLOBIN GLYCOSYLATED A1C: CPT

## 2021-01-07 ENCOUNTER — TELEPHONE (OUTPATIENT)
Dept: INTERNAL MEDICINE | Facility: CLINIC | Age: 61
End: 2021-01-07

## 2021-01-07 DIAGNOSIS — E10.65 TYPE 1 DIABETES MELLITUS WITH HYPERGLYCEMIA: Primary | ICD-10-CM

## 2021-01-15 ENCOUNTER — PATIENT OUTREACH (OUTPATIENT)
Dept: ADMINISTRATIVE | Facility: OTHER | Age: 61
End: 2021-01-15

## 2021-01-15 DIAGNOSIS — E10.65 TYPE 1 DIABETES MELLITUS WITH HYPERGLYCEMIA: Primary | ICD-10-CM

## 2021-01-19 ENCOUNTER — OFFICE VISIT (OUTPATIENT)
Dept: SLEEP MEDICINE | Facility: CLINIC | Age: 61
End: 2021-01-19
Payer: COMMERCIAL

## 2021-01-19 VITALS
DIASTOLIC BLOOD PRESSURE: 61 MMHG | SYSTOLIC BLOOD PRESSURE: 121 MMHG | HEART RATE: 71 BPM | HEIGHT: 70 IN | BODY MASS INDEX: 28.53 KG/M2 | WEIGHT: 199.31 LBS

## 2021-01-19 DIAGNOSIS — G47.33 OBSTRUCTIVE SLEEP APNEA: Primary | ICD-10-CM

## 2021-01-19 DIAGNOSIS — G25.81 RLS (RESTLESS LEGS SYNDROME): ICD-10-CM

## 2021-01-19 PROCEDURE — 99999 PR PBB SHADOW E&M-EST. PATIENT-LVL III: ICD-10-PCS | Mod: PBBFAC,,, | Performed by: NURSE PRACTITIONER

## 2021-01-19 PROCEDURE — 99214 OFFICE O/P EST MOD 30 MIN: CPT | Mod: S$GLB,,, | Performed by: NURSE PRACTITIONER

## 2021-01-19 PROCEDURE — 99999 PR PBB SHADOW E&M-EST. PATIENT-LVL III: CPT | Mod: PBBFAC,,, | Performed by: NURSE PRACTITIONER

## 2021-01-19 PROCEDURE — 99214 PR OFFICE/OUTPT VISIT, EST, LEVL IV, 30-39 MIN: ICD-10-PCS | Mod: S$GLB,,, | Performed by: NURSE PRACTITIONER

## 2021-01-21 ENCOUNTER — TELEPHONE (OUTPATIENT)
Dept: INTERNAL MEDICINE | Facility: CLINIC | Age: 61
End: 2021-01-21

## 2021-01-21 ENCOUNTER — PATIENT MESSAGE (OUTPATIENT)
Dept: INTERNAL MEDICINE | Facility: CLINIC | Age: 61
End: 2021-01-21

## 2021-01-22 ENCOUNTER — LAB VISIT (OUTPATIENT)
Dept: LAB | Facility: HOSPITAL | Age: 61
End: 2021-01-22
Attending: NURSE PRACTITIONER
Payer: COMMERCIAL

## 2021-01-22 DIAGNOSIS — E10.65 TYPE 1 DIABETES MELLITUS WITH HYPERGLYCEMIA: ICD-10-CM

## 2021-01-22 LAB
ESTIMATED AVG GLUCOSE: 154 MG/DL (ref 68–131)
HBA1C MFR BLD HPLC: 7 % (ref 4–5.6)

## 2021-01-22 PROCEDURE — 83036 HEMOGLOBIN GLYCOSYLATED A1C: CPT

## 2021-01-22 PROCEDURE — 36415 COLL VENOUS BLD VENIPUNCTURE: CPT | Mod: PN

## 2021-01-25 ENCOUNTER — OFFICE VISIT (OUTPATIENT)
Dept: INTERNAL MEDICINE | Facility: CLINIC | Age: 61
End: 2021-01-25
Payer: COMMERCIAL

## 2021-01-25 VITALS
BODY MASS INDEX: 28.31 KG/M2 | HEART RATE: 77 BPM | HEIGHT: 70 IN | OXYGEN SATURATION: 97 % | SYSTOLIC BLOOD PRESSURE: 120 MMHG | WEIGHT: 197.75 LBS | DIASTOLIC BLOOD PRESSURE: 76 MMHG

## 2021-01-25 DIAGNOSIS — H35.00 RETINOPATHY: ICD-10-CM

## 2021-01-25 DIAGNOSIS — E11.59 HYPERTENSION ASSOCIATED WITH DIABETES: ICD-10-CM

## 2021-01-25 DIAGNOSIS — E78.5 DYSLIPIDEMIA: ICD-10-CM

## 2021-01-25 DIAGNOSIS — E10.65 TYPE 1 DIABETES MELLITUS WITH HYPERGLYCEMIA: Primary | ICD-10-CM

## 2021-01-25 DIAGNOSIS — E03.9 HYPOTHYROIDISM, UNSPECIFIED TYPE: ICD-10-CM

## 2021-01-25 DIAGNOSIS — Z11.59 NEED FOR HEPATITIS C SCREENING TEST: ICD-10-CM

## 2021-01-25 DIAGNOSIS — G47.33 OBSTRUCTIVE SLEEP APNEA: ICD-10-CM

## 2021-01-25 DIAGNOSIS — I15.2 HYPERTENSION ASSOCIATED WITH DIABETES: ICD-10-CM

## 2021-01-25 DIAGNOSIS — Z11.4 ENCOUNTER FOR SCREENING FOR HIV: ICD-10-CM

## 2021-01-25 PROCEDURE — 99214 OFFICE O/P EST MOD 30 MIN: CPT | Mod: S$GLB,,, | Performed by: NURSE PRACTITIONER

## 2021-01-25 PROCEDURE — 99999 PR PBB SHADOW E&M-EST. PATIENT-LVL IV: CPT | Mod: PBBFAC,,, | Performed by: NURSE PRACTITIONER

## 2021-01-25 PROCEDURE — 99999 PR PBB SHADOW E&M-EST. PATIENT-LVL IV: ICD-10-PCS | Mod: PBBFAC,,, | Performed by: NURSE PRACTITIONER

## 2021-01-25 PROCEDURE — 99214 PR OFFICE/OUTPT VISIT, EST, LEVL IV, 30-39 MIN: ICD-10-PCS | Mod: S$GLB,,, | Performed by: NURSE PRACTITIONER

## 2021-02-01 ENCOUNTER — PATIENT MESSAGE (OUTPATIENT)
Dept: INTERNAL MEDICINE | Facility: CLINIC | Age: 61
End: 2021-02-01

## 2021-02-01 RX ORDER — INSULIN LISPRO 100 [IU]/ML
INJECTION, SOLUTION INTRAVENOUS; SUBCUTANEOUS
Qty: 3 VIAL | Refills: 6 | Status: SHIPPED | OUTPATIENT
Start: 2021-02-01 | End: 2021-02-11

## 2021-02-11 RX ORDER — INSULIN LISPRO 100 [IU]/ML
INJECTION, SOLUTION INTRAVENOUS; SUBCUTANEOUS
Qty: 3 VIAL | Refills: 8 | Status: SHIPPED | OUTPATIENT
Start: 2021-02-11 | End: 2022-03-01 | Stop reason: SDUPTHER

## 2021-03-08 ENCOUNTER — LAB VISIT (OUTPATIENT)
Dept: LAB | Facility: HOSPITAL | Age: 61
End: 2021-03-08
Attending: NURSE PRACTITIONER
Payer: COMMERCIAL

## 2021-03-08 DIAGNOSIS — E03.9 HYPOTHYROIDISM, UNSPECIFIED TYPE: ICD-10-CM

## 2021-03-08 LAB — TSH SERPL DL<=0.005 MIU/L-ACNC: 1.26 UIU/ML (ref 0.4–4)

## 2021-03-08 PROCEDURE — 36415 COLL VENOUS BLD VENIPUNCTURE: CPT | Mod: PN | Performed by: NURSE PRACTITIONER

## 2021-03-08 PROCEDURE — 84443 ASSAY THYROID STIM HORMONE: CPT | Performed by: NURSE PRACTITIONER

## 2021-07-21 ENCOUNTER — LAB VISIT (OUTPATIENT)
Dept: LAB | Facility: HOSPITAL | Age: 61
End: 2021-07-21
Payer: COMMERCIAL

## 2021-07-21 ENCOUNTER — PATIENT MESSAGE (OUTPATIENT)
Dept: INTERNAL MEDICINE | Facility: CLINIC | Age: 61
End: 2021-07-21

## 2021-07-21 DIAGNOSIS — Z11.59 NEED FOR HEPATITIS C SCREENING TEST: ICD-10-CM

## 2021-07-21 DIAGNOSIS — E03.9 HYPOTHYROIDISM, UNSPECIFIED TYPE: ICD-10-CM

## 2021-07-21 DIAGNOSIS — E78.5 DYSLIPIDEMIA: ICD-10-CM

## 2021-07-21 DIAGNOSIS — E10.65 TYPE 1 DIABETES MELLITUS WITH HYPERGLYCEMIA: ICD-10-CM

## 2021-07-21 DIAGNOSIS — Z11.4 ENCOUNTER FOR SCREENING FOR HIV: ICD-10-CM

## 2021-07-21 LAB
CHOLEST SERPL-MCNC: 151 MG/DL (ref 120–199)
CHOLEST/HDLC SERPL: 3.6 {RATIO} (ref 2–5)
ESTIMATED AVG GLUCOSE: 143 MG/DL (ref 68–131)
HBA1C MFR BLD: 6.6 % (ref 4–5.6)
HCV AB SERPL QL IA: NEGATIVE
HDLC SERPL-MCNC: 42 MG/DL (ref 40–75)
HDLC SERPL: 27.8 % (ref 20–50)
HIV 1+2 AB+HIV1 P24 AG SERPL QL IA: NEGATIVE
LDLC SERPL CALC-MCNC: 93.8 MG/DL (ref 63–159)
NONHDLC SERPL-MCNC: 109 MG/DL
T4 FREE SERPL-MCNC: 0.86 NG/DL (ref 0.71–1.51)
TRIGL SERPL-MCNC: 76 MG/DL (ref 30–150)
TSH SERPL DL<=0.005 MIU/L-ACNC: 4.97 UIU/ML (ref 0.4–4)

## 2021-07-21 PROCEDURE — 83036 HEMOGLOBIN GLYCOSYLATED A1C: CPT | Performed by: NURSE PRACTITIONER

## 2021-07-21 PROCEDURE — 84443 ASSAY THYROID STIM HORMONE: CPT | Performed by: NURSE PRACTITIONER

## 2021-07-21 PROCEDURE — 86803 HEPATITIS C AB TEST: CPT | Performed by: NURSE PRACTITIONER

## 2021-07-21 PROCEDURE — 87389 HIV-1 AG W/HIV-1&-2 AB AG IA: CPT | Performed by: NURSE PRACTITIONER

## 2021-07-21 PROCEDURE — 36415 COLL VENOUS BLD VENIPUNCTURE: CPT | Mod: PN | Performed by: NURSE PRACTITIONER

## 2021-07-21 PROCEDURE — 80061 LIPID PANEL: CPT | Performed by: NURSE PRACTITIONER

## 2021-07-21 PROCEDURE — 84439 ASSAY OF FREE THYROXINE: CPT | Performed by: NURSE PRACTITIONER

## 2021-07-30 ENCOUNTER — PATIENT MESSAGE (OUTPATIENT)
Dept: INTERNAL MEDICINE | Facility: CLINIC | Age: 61
End: 2021-07-30

## 2021-08-04 ENCOUNTER — OFFICE VISIT (OUTPATIENT)
Dept: INTERNAL MEDICINE | Facility: CLINIC | Age: 61
End: 2021-08-04
Payer: COMMERCIAL

## 2021-08-04 DIAGNOSIS — E78.5 DYSLIPIDEMIA: ICD-10-CM

## 2021-08-04 DIAGNOSIS — E03.9 HYPOTHYROIDISM, UNSPECIFIED TYPE: ICD-10-CM

## 2021-08-04 DIAGNOSIS — I15.2 HYPERTENSION ASSOCIATED WITH DIABETES: ICD-10-CM

## 2021-08-04 DIAGNOSIS — E11.59 HYPERTENSION ASSOCIATED WITH DIABETES: ICD-10-CM

## 2021-08-04 DIAGNOSIS — E10.65 TYPE 1 DIABETES MELLITUS WITH HYPERGLYCEMIA: Primary | ICD-10-CM

## 2021-08-04 DIAGNOSIS — G47.33 OBSTRUCTIVE SLEEP APNEA: ICD-10-CM

## 2021-08-04 DIAGNOSIS — E11.649 HYPOGLYCEMIA ASSOCIATED WITH DIABETES: ICD-10-CM

## 2021-08-04 PROCEDURE — 99442 PR PHYSICIAN TELEPHONE EVALUATION 11-20 MIN: ICD-10-PCS | Mod: 95,,, | Performed by: NURSE PRACTITIONER

## 2021-08-04 PROCEDURE — 99442 PR PHYSICIAN TELEPHONE EVALUATION 11-20 MIN: CPT | Mod: 95,,, | Performed by: NURSE PRACTITIONER

## 2021-08-09 ENCOUNTER — TELEPHONE (OUTPATIENT)
Dept: INTERNAL MEDICINE | Facility: CLINIC | Age: 61
End: 2021-08-09

## 2021-11-18 ENCOUNTER — PATIENT MESSAGE (OUTPATIENT)
Dept: SLEEP MEDICINE | Facility: CLINIC | Age: 61
End: 2021-11-18
Payer: COMMERCIAL

## 2021-12-08 ENCOUNTER — PATIENT MESSAGE (OUTPATIENT)
Dept: SLEEP MEDICINE | Facility: CLINIC | Age: 61
End: 2021-12-08
Payer: COMMERCIAL

## 2022-02-02 ENCOUNTER — LAB VISIT (OUTPATIENT)
Dept: LAB | Facility: HOSPITAL | Age: 62
End: 2022-02-02
Attending: NURSE PRACTITIONER
Payer: COMMERCIAL

## 2022-02-02 DIAGNOSIS — E10.65 TYPE 1 DIABETES MELLITUS WITH HYPERGLYCEMIA: ICD-10-CM

## 2022-02-02 DIAGNOSIS — E03.9 HYPOTHYROIDISM, UNSPECIFIED TYPE: ICD-10-CM

## 2022-02-02 LAB
ESTIMATED AVG GLUCOSE: 143 MG/DL (ref 68–131)
HBA1C MFR BLD: 6.6 % (ref 4–5.6)
T4 FREE SERPL-MCNC: 0.82 NG/DL (ref 0.71–1.51)
TSH SERPL DL<=0.005 MIU/L-ACNC: 9.56 UIU/ML (ref 0.4–4)

## 2022-02-02 PROCEDURE — 84443 ASSAY THYROID STIM HORMONE: CPT | Performed by: NURSE PRACTITIONER

## 2022-02-02 PROCEDURE — 83036 HEMOGLOBIN GLYCOSYLATED A1C: CPT | Performed by: NURSE PRACTITIONER

## 2022-02-02 PROCEDURE — 84439 ASSAY OF FREE THYROXINE: CPT | Performed by: NURSE PRACTITIONER

## 2022-02-02 PROCEDURE — 36415 COLL VENOUS BLD VENIPUNCTURE: CPT | Mod: PN | Performed by: NURSE PRACTITIONER

## 2022-02-04 ENCOUNTER — PATIENT MESSAGE (OUTPATIENT)
Dept: INTERNAL MEDICINE | Facility: CLINIC | Age: 62
End: 2022-02-04
Payer: COMMERCIAL

## 2022-02-08 NOTE — PROGRESS NOTES
CC: Patient is here for management of Type 1 diabetes and review of medical conditions as listed in visit diagnosis.      HPI: Mr. Owen Radford is a 61 y.o. White male who was diagnosed with SUSAN/Type 1  DM x 25 years ago, seen by Dr. Sheffield in 2016.    Last seen by me in 1/ 2021 ,audio visit fall 2021 and is now being seen by me again today.  In the past on 630 g ([employer]) 2017--interested in 670g-780g.  [facility] surgery in fall for (R) rotator cuff repair, still rehab/PT 3x a week.    Suspends at 90 mg/dl     Target  mg/dl  iob 2:30 hours  isf 31 mg/dl  1:10 icr   78% basal  mn-0700 0.975 u/hr  7a-12p 2.2 u/hr   12n-3p 2.15 u/hr  3p-mn 1.75 u/hr     BG trends 183-167  [phone]  [phone]  100-173   107 -170   191-180   124-186 mg/dl   30 day TIR 64% 1% hypoglycemia  tdd 45.825   78% basal    7 day TIR 63% 1% hypoglycemia   7 day 164 mg/dl, sd 28 mg/dl  -270 mg/dl     Sensor   158 mg/dl  6.5 sd     On pump, injections 4 x a day  Testing 4 times a day  Type 1 demonstrated an understanding of technology and motivated to use the device correctly. Patients are expected to adhere to a diabetes treatment plan and are capable of recognizing alerts and alarms.    Patient has  impaired awareness of hypoglycemia and puts patient at risk     Lab Results   Component Value Date    HGBA1C 6.6 (H) 02/02/2022     Has h/o severe hypoglycemia, 40 -50 mg/dl  H/o nocturnal hypoglycemia  Has hypoglycemia unawareness     Has h/o hypothyroidism, last tsh, t4 free  Lab Results   Component Value Date    TSH 9.561 (H) 02/02/2022   needs repeat, on lt4 200 mcg daily -not always taking without other medications  Holding off lt4 25 mcg additional    CURRENT DIABETIC MEDS: novolog w/ insulin pump   Auto mode mainly  mn-7p 1 u/hr  Then 2.15-2.3 u/hr  Target  mg/dl  iob 2:15 mins  isf 27  icr 1:8  mn-mn    Diabetes Management Status    Statin: Taking  ACE/ARB: Taking    Screening or Prevention Patient's value Goal  Complete/Controlled?   HgA1C Testing and Control   Lab Results   Component Value Date    HGBA1C 6.6 (H) 02/02/2022      Annually/Less than 8% Yes   Lipid profile : 07/21/2021 Annually Yes   LDL control Lab Results   Component Value Date    LDLCALC 93.8 07/21/2021    Annually/Less than 100 mg/dl  Yes   Nephropathy screening Lab Results   Component Value Date    LABMICR 10.0 02/02/2022     No results found for: PROTEINUA Annually Yes   Blood pressure BP Readings from Last 1 Encounters:   01/25/21 120/76    Less than 140/90 Yes   Dilated retinal exam : 04/08/2019 Annually No   Foot exam   : 01/25/2021 Annually Yes   DR-annually April 2019  [facility]     REVIEW OF SYSTEMS  General: no weakness, fatigue, +weight stable  Eyes: no double or blurred vision, eye pain, or redness   Cardiovascular: no chest pain, palpitations, edema, or murmurs.   Respiratory: no cough or dyspnea.   GI: no heartburn, nausea, or changes in bowel patterns; good appetite.   Skin: no rashes, dryness, itching, or reactions at insulin injection sites.   Neuro: no numbness, tingling, tremors, or vertigo.   Endocrine: no polyuria, polydipsia, polyphagia, heat or cold intolerance.     Vital Signs  There were no vitals taken for this visit.    Hemoglobin A1C   Date Value Ref Range Status   02/02/2022 6.6 (H) 4.0 - 5.6 % Final     Comment:     ADA Screening Guidelines:  5.7-6.4%  Consistent with prediabetes  >or=6.5%  Consistent with diabetes    High levels of fetal hemoglobin interfere with the HbA1C  assay. Heterozygous hemoglobin variants (HbS, HgC, etc)do  not significantly interfere with this assay.   However, presence of multiple variants may affect accuracy.     07/21/2021 6.6 (H) 4.0 - 5.6 % Final     Comment:     ADA Screening Guidelines:  5.7-6.4%  Consistent with prediabetes  >or=6.5%  Consistent with diabetes    High levels of fetal hemoglobin interfere with the HbA1C  assay. Heterozygous hemoglobin variants (HbS, HgC,  etc)do  not significantly interfere with this assay.   However, presence of multiple variants may affect accuracy.     01/22/2021 7.0 (H) 4.0 - 5.6 % Final     Comment:     ADA Screening Guidelines:  5.7-6.4%  Consistent with prediabetes  >or=6.5%  Consistent with diabetes  High levels of fetal hemoglobin interfere with the HbA1C  assay. Heterozygous hemoglobin variants (HbS, HgC, etc)do  not significantly interfere with this assay.   However, presence of multiple variants may affect accuracy.        Lab Results   Component Value Date    CREATININE 1.2 12/01/2020      Lab Results   Component Value Date    TSH 9.561 (H) 02/02/2022      Lab Results   Component Value Date    LDLCALC 93.8 07/21/2021        PHYSICAL EXAMINATION  Constitutional: Appears well, no distress  Neck: Supple, trachea midline.   Respiratory: No wheezes, even and unlabored.  Cardiovascular: RRR  Lymph: DP pulses, no edema.   Skin: warm and dry; no injection site reactions, no acanthosis nigracans observed.  Neuro:patient alert and cooperative, normal affect.    Diabetes Foot Exam:   Deferred     Assessment/Plan  1. Type 1 diabetes mellitus with hyperglycemia  Hemoglobin A1C    Ambulatory referral/consult to Podiatry    Comprehensive Metabolic Panel   2. Obstructive sleep apnea     3. Hypothyroidism, unspecified type  TSH    T4, Free    TSH   4. Hypertension associated with diabetes     5. Dyslipidemia       1-5. Follow up in 6 months   Tsh t4 free cmp in 6 weeks  Other labs in 6 months   Discussed newer pumps, features   Has refills glucagon kit   Continue settings at this time  During visit 214-222 mg/dl   bp slightly elevated -repeat 148/64   tsh- elevated-taking lt4 at night  Repeat if still elevated add 25 mcg to 200 mcg daily    Only change is Icr 1:9, isf 25.    Back up lantus or levemir 46 units at night       FOLLOW UP  Follow up in about 6 months (around 8/9/2022).     Orders Placed This Encounter   Procedures    Hemoglobin A1C      Standing Status:   Future     Standing Expiration Date:   4/9/2023    TSH     Standing Status:   Future     Standing Expiration Date:   4/9/2023    T4, Free     Standing Status:   Future     Standing Expiration Date:   4/10/2023    TSH     Standing Status:   Future     Standing Expiration Date:   4/10/2023    Ambulatory referral/consult to Podiatry     Standing Status:   Future     Standing Expiration Date:   3/9/2023     Referral Priority:   Routine     Referral Type:   Consultation     Referral Reason:   Specialty Services Required     Requested Specialty:   Podiatry     Number of Visits Requested:   1

## 2022-02-09 ENCOUNTER — OFFICE VISIT (OUTPATIENT)
Dept: INTERNAL MEDICINE | Facility: CLINIC | Age: 62
End: 2022-02-09
Payer: COMMERCIAL

## 2022-02-09 VITALS
HEIGHT: 70 IN | DIASTOLIC BLOOD PRESSURE: 64 MMHG | SYSTOLIC BLOOD PRESSURE: 148 MMHG | BODY MASS INDEX: 28.91 KG/M2 | WEIGHT: 201.94 LBS

## 2022-02-09 DIAGNOSIS — E78.5 DYSLIPIDEMIA: ICD-10-CM

## 2022-02-09 DIAGNOSIS — E03.9 HYPOTHYROIDISM, UNSPECIFIED TYPE: ICD-10-CM

## 2022-02-09 DIAGNOSIS — I15.2 HYPERTENSION ASSOCIATED WITH DIABETES: ICD-10-CM

## 2022-02-09 DIAGNOSIS — E11.59 HYPERTENSION ASSOCIATED WITH DIABETES: ICD-10-CM

## 2022-02-09 DIAGNOSIS — E10.65 TYPE 1 DIABETES MELLITUS WITH HYPERGLYCEMIA: Primary | ICD-10-CM

## 2022-02-09 DIAGNOSIS — G47.33 OBSTRUCTIVE SLEEP APNEA: ICD-10-CM

## 2022-02-09 PROCEDURE — 99215 OFFICE O/P EST HI 40 MIN: CPT | Mod: S$GLB,,, | Performed by: NURSE PRACTITIONER

## 2022-02-09 PROCEDURE — 99215 PR OFFICE/OUTPT VISIT, EST, LEVL V, 40-54 MIN: ICD-10-PCS | Mod: S$GLB,,, | Performed by: NURSE PRACTITIONER

## 2022-02-09 PROCEDURE — 99999 PR PBB SHADOW E&M-EST. PATIENT-LVL IV: ICD-10-PCS | Mod: PBBFAC,,, | Performed by: NURSE PRACTITIONER

## 2022-02-09 PROCEDURE — 99999 PR PBB SHADOW E&M-EST. PATIENT-LVL IV: CPT | Mod: PBBFAC,,, | Performed by: NURSE PRACTITIONER

## 2022-02-09 NOTE — PATIENT INSTRUCTIONS
A1c tsh in 6 months  Tsh t4 free in 6 weeks  Follow up in 6 months w/Ninfa   Podiatry 2022     isf 25 mg/dl  1:9 icr   78% basal  mn-0700 0.975 u/hr  7a-12p 2.2 u/hr   12n-3p 2.15 u/hr  3p-mn 1.75 u/hr     Back up insulin:   lnatus or levemir 46 units at night

## 2022-02-22 ENCOUNTER — PATIENT MESSAGE (OUTPATIENT)
Dept: INTERNAL MEDICINE | Facility: CLINIC | Age: 62
End: 2022-02-22
Payer: COMMERCIAL

## 2022-03-23 ENCOUNTER — LAB VISIT (OUTPATIENT)
Dept: LAB | Facility: HOSPITAL | Age: 62
End: 2022-03-23
Attending: NURSE PRACTITIONER
Payer: COMMERCIAL

## 2022-03-23 DIAGNOSIS — E10.65 TYPE 1 DIABETES MELLITUS WITH HYPERGLYCEMIA: ICD-10-CM

## 2022-03-23 LAB
ALBUMIN SERPL BCP-MCNC: 3.6 G/DL (ref 3.5–5.2)
ALP SERPL-CCNC: 71 U/L (ref 55–135)
ALT SERPL W/O P-5'-P-CCNC: 15 U/L (ref 10–44)
ANION GAP SERPL CALC-SCNC: 8 MMOL/L (ref 8–16)
AST SERPL-CCNC: 15 U/L (ref 10–40)
BILIRUB SERPL-MCNC: 0.7 MG/DL (ref 0.1–1)
BUN SERPL-MCNC: 16 MG/DL (ref 8–23)
CALCIUM SERPL-MCNC: 9 MG/DL (ref 8.7–10.5)
CHLORIDE SERPL-SCNC: 105 MMOL/L (ref 95–110)
CO2 SERPL-SCNC: 27 MMOL/L (ref 23–29)
CREAT SERPL-MCNC: 0.8 MG/DL (ref 0.5–1.4)
EST. GFR  (AFRICAN AMERICAN): >60 ML/MIN/1.73 M^2
EST. GFR  (NON AFRICAN AMERICAN): >60 ML/MIN/1.73 M^2
GLUCOSE SERPL-MCNC: 91 MG/DL (ref 70–110)
POTASSIUM SERPL-SCNC: 4.1 MMOL/L (ref 3.5–5.1)
PROT SERPL-MCNC: 6.2 G/DL (ref 6–8.4)
SODIUM SERPL-SCNC: 140 MMOL/L (ref 136–145)

## 2022-03-23 PROCEDURE — 80053 COMPREHEN METABOLIC PANEL: CPT | Performed by: NURSE PRACTITIONER

## 2022-03-23 PROCEDURE — 36415 COLL VENOUS BLD VENIPUNCTURE: CPT | Mod: PN | Performed by: NURSE PRACTITIONER

## 2022-03-28 ENCOUNTER — TELEPHONE (OUTPATIENT)
Dept: INTERNAL MEDICINE | Facility: CLINIC | Age: 62
End: 2022-03-28
Payer: COMMERCIAL

## 2022-03-28 ENCOUNTER — PATIENT MESSAGE (OUTPATIENT)
Dept: INTERNAL MEDICINE | Facility: CLINIC | Age: 62
End: 2022-03-28
Payer: COMMERCIAL

## 2022-03-28 DIAGNOSIS — E03.9 HYPOTHYROIDISM, UNSPECIFIED TYPE: Primary | ICD-10-CM

## 2022-03-31 ENCOUNTER — LAB VISIT (OUTPATIENT)
Dept: LAB | Facility: HOSPITAL | Age: 62
End: 2022-03-31
Attending: NURSE PRACTITIONER
Payer: COMMERCIAL

## 2022-03-31 ENCOUNTER — PATIENT MESSAGE (OUTPATIENT)
Dept: INTERNAL MEDICINE | Facility: CLINIC | Age: 62
End: 2022-03-31
Payer: COMMERCIAL

## 2022-03-31 DIAGNOSIS — E03.9 HYPOTHYROIDISM, UNSPECIFIED TYPE: ICD-10-CM

## 2022-03-31 LAB
T4 FREE SERPL-MCNC: 0.98 NG/DL (ref 0.71–1.51)
TSH SERPL DL<=0.005 MIU/L-ACNC: 0.47 UIU/ML (ref 0.4–4)

## 2022-03-31 PROCEDURE — 84443 ASSAY THYROID STIM HORMONE: CPT | Performed by: NURSE PRACTITIONER

## 2022-03-31 PROCEDURE — 84439 ASSAY OF FREE THYROXINE: CPT | Performed by: NURSE PRACTITIONER

## 2022-03-31 PROCEDURE — 36415 COLL VENOUS BLD VENIPUNCTURE: CPT | Mod: PN | Performed by: NURSE PRACTITIONER

## 2022-06-29 ENCOUNTER — PATIENT MESSAGE (OUTPATIENT)
Dept: INTERNAL MEDICINE | Facility: CLINIC | Age: 62
End: 2022-06-29
Payer: COMMERCIAL

## 2022-06-29 RX ORDER — GLUCAGON 3 MG/1
POWDER NASAL
Qty: 2 EACH | Refills: 3 | Status: SHIPPED | OUTPATIENT
Start: 2022-06-29

## 2022-08-09 NOTE — PROGRESS NOTES
CC: Patient is here for management of Type 1 diabetes and review of medical conditions as listed in visit diagnosis.      HPI: Mr. Owen Radford is a 62 y.o. White male who was diagnosed with SUSAN/Type 1  DM x 25 years ago, seen by Dr. Sheffield in 2016.    Last seen by me in 2/ 2022 ,audio visit fall 2021 and is now being seen by me again today.  Overdue for labs  Had (R) shoulder sx (rotator cuff) , f/u with hand doctor next week  Recently got new 670 g   Suspends at 90 mg/dl    Target  mg/dl   iob 2:30 hours  ICR 1:12  isf 40     mn 0.95 u/hr  0600 1.95 u/hr  12n 1.95 u/hr  430p 1.8 u/hr    TIR 71%   Manual 100%  Sensor wear 81%   avg 148 =/- 56 mg/dl   gmi 6.9%   Variable 37.6%   avg fingerstick 152 +/- 55 mg/dl  tdd 45.2 units    On pump, injections 4 x a day  Testing 4 times a day  Type 1 demonstrated an understanding of technology and motivated to use the device correctly. Patients are expected to adhere to a diabetes treatment plan and are capable of recognizing alerts and alarms.    Patient has  impaired awareness of hypoglycemia and puts patient at risk     Lab Results   Component Value Date    HGBA1C 6.6 (H) 02/02/2022     Has h/o severe hypoglycemia, 40 -50 mg/dl  H/o nocturnal hypoglycemia  Has hypoglycemia unawareness     Has h/o hypothyroidism, last tsh, t4 free  Lab Results   Component Value Date    TSH 0.470 03/31/2022   needs repeat, on lt4 200 mcg daily -not always taking without other medications  Holding off lt4 25 mcg additional    CURRENT DIABETIC MEDS: novolog w/ insulin pump   Auto mode mainly  See above    Diabetes Management Status    Statin: Taking  ACE/ARB: Taking    Screening or Prevention Patient's value Goal Complete/Controlled?   HgA1C Testing and Control   Lab Results   Component Value Date    HGBA1C 6.6 (H) 02/02/2022      Annually/Less than 8% Yes   Lipid profile : 07/21/2021 Annually Yes   LDL control Lab Results   Component Value Date    LDLCALC 93.8 07/21/2021     "Annually/Less than 100 mg/dl  Yes   Nephropathy screening Lab Results   Component Value Date    LABMICR 10.0 02/02/2022     No results found for: PROTEINUA Annually Yes   Blood pressure BP Readings from Last 1 Encounters:   08/10/22 118/62    Less than 140/90 Yes   Dilated retinal exam : 04/08/2019 Annually No   Foot exam   : 01/25/2021 Annually Yes   DR-annually April 2019  [facility]     REVIEW OF SYSTEMS  General: no weakness, fatigue, +weight loss 11#   Eyes: no double or blurred vision, eye pain, or redness   Cardiovascular: no chest pain, palpitations, edema, or murmurs.   Respiratory: no cough or dyspnea.   GI: no heartburn, nausea, or changes in bowel patterns; good appetite.   Skin: no rashes, dryness, itching, or reactions at insulin injection sites.   Neuro: no numbness, tingling, tremors, or vertigo.   Endocrine: no polyuria, polydipsia, polyphagia, heat or cold intolerance.     Vital Signs  /62 (BP Location: Left arm, Patient Position: Sitting, BP Method: Medium (Manual))   Pulse 63   Ht 5' 10" (1.778 m)   Wt 86.4 kg (190 lb 7.6 oz)   SpO2 98%   BMI 27.33 kg/m²     Hemoglobin A1C   Date Value Ref Range Status   02/02/2022 6.6 (H) 4.0 - 5.6 % Final     Comment:     ADA Screening Guidelines:  5.7-6.4%  Consistent with prediabetes  >or=6.5%  Consistent with diabetes    High levels of fetal hemoglobin interfere with the HbA1C  assay. Heterozygous hemoglobin variants (HbS, HgC, etc)do  not significantly interfere with this assay.   However, presence of multiple variants may affect accuracy.     07/21/2021 6.6 (H) 4.0 - 5.6 % Final     Comment:     ADA Screening Guidelines:  5.7-6.4%  Consistent with prediabetes  >or=6.5%  Consistent with diabetes    High levels of fetal hemoglobin interfere with the HbA1C  assay. Heterozygous hemoglobin variants (HbS, HgC, etc)do  not significantly interfere with this assay.   However, presence of multiple variants may affect accuracy.   "   01/22/2021 7.0 (H) 4.0 - 5.6 % Final     Comment:     ADA Screening Guidelines:  5.7-6.4%  Consistent with prediabetes  >or=6.5%  Consistent with diabetes  High levels of fetal hemoglobin interfere with the HbA1C  assay. Heterozygous hemoglobin variants (HbS, HgC, etc)do  not significantly interfere with this assay.   However, presence of multiple variants may affect accuracy.        Lab Results   Component Value Date    CREATININE 0.8 03/23/2022      Lab Results   Component Value Date    TSH 0.470 03/31/2022      Lab Results   Component Value Date    LDLCALC 93.8 07/21/2021        PHYSICAL EXAMINATION  Constitutional: Appears well, no distress  Neck: Supple, trachea midline.   Respiratory: No wheezes, even and unlabored.  Cardiovascular: RRR  Lymph: DP pulses, no edema.   Skin: warm and dry; no injection site reactions, no acanthosis nigracans observed.  Neuro:patient alert and cooperative, normal affect.    Diabetes Foot Exam:   Protective Sensation (w/ 10 gram monofilament):  Right: Intact 7/7   Left: Intact 7/7     Visual Inspection:  Normal -  Bilateral    Pedal Pulses:   Right: Present  Left: Present    Posterior tibialis:   Right:Present  Left: Present    Assessment/Plan  1. Type 1 diabetes mellitus with hyperglycemia  Hemoglobin A1C    Hemoglobin A1C   2. Obstructive sleep apnea     3. Hypothyroidism, unspecified type  TSH    TSH   4. Hypertension associated with diabetes     5. Dyslipidemia  Lipid Panel     1-5. Follow up in 5-6 months  a1c tsh today  a1c tsh lipid next time   A1c goal less than 7%   Discussed other options for pump therapy, cgm   medtronic 780 g at end of year   Stable     Target  mg/dl   iob 2:30 hours  ICR 1:12--change to 10  isf 40   mn 0.95 u/hr  0600 1.95 u/hr  12n 1.95 u/hr  430p 1.8 u/hr      Lab Results   Component Value Date    TSH 0.470 03/31/2022     At goal   Continue lt4   bp controlled  Lab Results   Component Value Date    LDLCALC 93.8 07/21/2021     At goal   Lipid  next time    Back up lantus or levemir 40-44 units at night     FOLLOW UP  In 5 -6 months      Orders Placed This Encounter   Procedures    Hemoglobin A1C     Standing Status:   Future     Standing Expiration Date:   10/8/2023    TSH     Standing Status:   Future     Standing Expiration Date:   10/8/2023    Lipid Panel     Standing Status:   Future     Standing Expiration Date:   10/9/2023    Hemoglobin A1C     Standing Status:   Future     Standing Expiration Date:   10/9/2023    TSH     Standing Status:   Future     Standing Expiration Date:   10/9/2023

## 2022-08-10 ENCOUNTER — OFFICE VISIT (OUTPATIENT)
Dept: INTERNAL MEDICINE | Facility: CLINIC | Age: 62
End: 2022-08-10
Payer: COMMERCIAL

## 2022-08-10 ENCOUNTER — LAB VISIT (OUTPATIENT)
Dept: LAB | Facility: HOSPITAL | Age: 62
End: 2022-08-10
Payer: COMMERCIAL

## 2022-08-10 VITALS
OXYGEN SATURATION: 98 % | HEART RATE: 63 BPM | WEIGHT: 190.5 LBS | SYSTOLIC BLOOD PRESSURE: 118 MMHG | HEIGHT: 70 IN | DIASTOLIC BLOOD PRESSURE: 62 MMHG | BODY MASS INDEX: 27.27 KG/M2

## 2022-08-10 DIAGNOSIS — G47.33 OBSTRUCTIVE SLEEP APNEA: ICD-10-CM

## 2022-08-10 DIAGNOSIS — E78.5 DYSLIPIDEMIA: ICD-10-CM

## 2022-08-10 DIAGNOSIS — E03.9 HYPOTHYROIDISM, UNSPECIFIED TYPE: ICD-10-CM

## 2022-08-10 DIAGNOSIS — E10.65 TYPE 1 DIABETES MELLITUS WITH HYPERGLYCEMIA: ICD-10-CM

## 2022-08-10 DIAGNOSIS — E11.59 HYPERTENSION ASSOCIATED WITH DIABETES: ICD-10-CM

## 2022-08-10 DIAGNOSIS — I15.2 HYPERTENSION ASSOCIATED WITH DIABETES: ICD-10-CM

## 2022-08-10 DIAGNOSIS — E10.65 TYPE 1 DIABETES MELLITUS WITH HYPERGLYCEMIA: Primary | ICD-10-CM

## 2022-08-10 LAB
ESTIMATED AVG GLUCOSE: 134 MG/DL (ref 68–131)
HBA1C MFR BLD: 6.3 % (ref 4–5.6)
TSH SERPL DL<=0.005 MIU/L-ACNC: 1.32 UIU/ML (ref 0.4–4)

## 2022-08-10 PROCEDURE — 84443 ASSAY THYROID STIM HORMONE: CPT | Performed by: NURSE PRACTITIONER

## 2022-08-10 PROCEDURE — 36415 COLL VENOUS BLD VENIPUNCTURE: CPT | Performed by: NURSE PRACTITIONER

## 2022-08-10 PROCEDURE — 83036 HEMOGLOBIN GLYCOSYLATED A1C: CPT | Performed by: NURSE PRACTITIONER

## 2022-08-10 PROCEDURE — 99214 PR OFFICE/OUTPT VISIT, EST, LEVL IV, 30-39 MIN: ICD-10-PCS | Mod: S$GLB,,, | Performed by: NURSE PRACTITIONER

## 2022-08-10 PROCEDURE — 99999 PR PBB SHADOW E&M-EST. PATIENT-LVL IV: CPT | Mod: PBBFAC,,, | Performed by: NURSE PRACTITIONER

## 2022-08-10 PROCEDURE — 99214 OFFICE O/P EST MOD 30 MIN: CPT | Mod: S$GLB,,, | Performed by: NURSE PRACTITIONER

## 2022-08-10 PROCEDURE — 99999 PR PBB SHADOW E&M-EST. PATIENT-LVL IV: ICD-10-PCS | Mod: PBBFAC,,, | Performed by: NURSE PRACTITIONER

## 2022-08-10 RX ORDER — INSULIN LISPRO 100 [IU]/ML
INJECTION, SOLUTION INTRAVENOUS; SUBCUTANEOUS
Qty: 30 ML | Refills: 8 | Status: SHIPPED | OUTPATIENT
Start: 2022-08-10 | End: 2023-07-19 | Stop reason: SDUPTHER

## 2022-08-10 NOTE — PATIENT INSTRUCTIONS
Medtronic 780 g   Guardian sensor    Omnipod 5  Automated/dexcom g6     Tandem x2 /dexcom g6      Lab Results   Component Value Date    HGBA1C 6.6 (H) 02/02/2022     Goal less than 7%     A1c tsh today  A1c lipid tsh next time   Follow up in 5-6 months w/Irielle     Target  mg/dl   iob 2:30 hours  ICR 1:10  isf 40     mn 0.95 u/hr  0600 1.95 u/hr  12n 1.95 u/hr  430p 1.8 u/hr

## 2022-08-25 ENCOUNTER — TELEPHONE (OUTPATIENT)
Dept: INTERNAL MEDICINE | Facility: CLINIC | Age: 62
End: 2022-08-25
Payer: COMMERCIAL

## 2022-08-25 NOTE — TELEPHONE ENCOUNTER
----- Message from MAGDA Bradley, GERALDP sent at 8/25/2022  1:02 PM CDT -----  Regarding: Medtronic   Hi  In file -chart note and cmn needs to be sent to [phone] instead   Upgrade with Medtronic pump   Atthoward Daniels   Thanks   Ninfa   History of appendectomy    S/P     S/P cholecystectomy    Suprapubic catheter

## 2022-10-04 ENCOUNTER — CLINICAL SUPPORT (OUTPATIENT)
Dept: DIABETES | Facility: CLINIC | Age: 62
End: 2022-10-04
Payer: COMMERCIAL

## 2022-10-04 VITALS — BODY MASS INDEX: 27.63 KG/M2 | WEIGHT: 192.56 LBS

## 2022-10-04 DIAGNOSIS — E10.65 TYPE 1 DIABETES MELLITUS WITH HYPERGLYCEMIA: ICD-10-CM

## 2022-10-04 DIAGNOSIS — E10.65 TYPE 1 DIABETES MELLITUS WITH HYPERGLYCEMIA: Primary | ICD-10-CM

## 2022-10-04 PROCEDURE — G0108 DIAB MANAGE TRN  PER INDIV: HCPCS | Mod: S$GLB,,, | Performed by: INTERNAL MEDICINE

## 2022-10-04 PROCEDURE — 99999 PR PBB SHADOW E&M-EST. PATIENT-LVL II: ICD-10-PCS | Mod: PBBFAC,,,

## 2022-10-04 PROCEDURE — G0108 PR DIAB MANAGE TRN  PER INDIV: ICD-10-PCS | Mod: S$GLB,,, | Performed by: INTERNAL MEDICINE

## 2022-10-04 PROCEDURE — 99999 PR PBB SHADOW E&M-EST. PATIENT-LVL II: CPT | Mod: PBBFAC,,,

## 2022-10-11 NOTE — PROGRESS NOTES
Diabetes Care Specialist Progress Note  Author: Rachana Solomon RN, CDE  Date: 10/4/2022    Program Intake  Reason for Diabetes Program Visit:: Initial Diabetes Assessment  Current diabetes risk level:: low  In the last 12 months, have you:: none    Lab Results   Component Value Date    HGBA1C 6.3 (H) 08/10/2022       Clinical    Patient Health Rating  Compared to other people your age, how would you rate your health?: Good    Problem Review  Reviewed Problem List with Patient: yes  Active comorbidities affecting diabetes self-care.: yes  Comorbidities: Hypertension  Reviewed health maintenance: yes    Clinical Assessment  Current Diabetes Treatment: Insulin pump ( G out of warranty)  Have you ever experienced hyperglycemia (high blood sugar)?: no    Medication Information  How do you obtain your medications?: Patient drives  How many days a week do you miss your medications?: Never        Additional Social History    Support  Does anyone support you with your diabetes care?: yes  Who supports you?: spouse  Who takes you to your medical appointments?: self  Does the current support meet the patient's needs?: Yes  Is Support an area impacting ability to self-manage diabetes?: No    Access to Mass Media & Technology  Does the patient have access to any of the following devices or technologies?: Smart phone, Internet Access, Home computer  Media or technology needs impacting ability to self-manage diabetes?: No    Cognitive/Behavioral Health  Alert and Oriented: Yes  Difficulty Thinking: No  Requires Prompting: No  Requires assistance for routine expression?: No  Cognitive or behavioral barriers impacting ability to self-manage diabetes?: No    Culture/Bahai  Culture or Evangelical beliefs that may impact ability to access healthcare: No    Communication  Language preference: English  Hearing Problems: No  Vision Problems: No  Communication needs impacting ability to self-manage diabetes?: No    Health  Literacy  Preferred Learning Method: Face to Face, Reading Materials, Hands On, Demonstration  How often do you need to have someone help you read instructions, pamphlets, or written material from your doctor or pharmacy?: Never  Health literacy needs impacting ability to self-manage diabetes?: No      Diabetes Self-Management Skills Assessment    Diabetes Disease Process/Treatment Options  Patient/caregiver able to state what happens when someone has diabetes.: yes  Patient/caregiver knows what type of diabetes they have.: yes  Diabetes Type : Type I  Diabetes Disease Process/Treatment Options: Skills Assessment Completed: Yes  Assessment indicates:: Adequate understanding  Area of need?: No    Nutrition/Healthy Eating  Nutrition/Healthy Eating Skills Assessment Completed:: No  Deffered due to:: Time    Physical Activity/Exercise  Physical Activity/Exercise Skills Assessment Completed: : No  Deffered due to:: Time    Medications  Patient is able to describe current diabetes management routine.: yes  Diabetes management routine:: insulin pump  Patient is able to identify current diabetes medications, dosages, and appropriate timing of medications.: yes  Patient understands the purpose of the medications taken for diabetes.: yes  Patient reports problems or concerns with current medication regimen.: no  Medication Skills Assessment Completed:: Yes  Assessment indicates:: Adequate understanding  Area of need?: No    Home Blood Glucose Monitoring  Home Blood Glucose Monitoring Skills Assessment Completed: : No  Deferred due to:: Time    Acute Complications  Acute Complications Skills Assessment Completed: : No  Deffered due to:: Time    Chronic Complications  Chronic Complications Skills Assessment Completed: : No  Deferred due to:: Time    Psychosocial/Coping  Psychosocial/Coping Skills Assessment Completed: : No  Deffered due to:: Time      Assessment Summary and Plan    Based on today's diabetes care assessment, the  following areas of need were identified:      Social 10/4/2022   Support No   Access to Mass Media/Tech No   Cognitive/Behavioral Health No   Culture/Jew No   Communication No   Health Literacy No        Diabetes Self-Management Skills 10/4/2022   Diabetes Disease Process/Treatment Options No   Medication No          Today's interventions were provided through individual discussion, instruction, and written materials were provided.      Patient verbalized understanding of instruction and written materials.  Pt was able to return back demonstration of instructions today. Patient understood key points, needs reinforcement and further instruction.     Diabetes Self-Management Care Plan:    Today's Diabetes Self-Management Care Plan was developed with Owen's input. Owen has agreed to work toward the following goal(s) to improve his/her overall diabetes control.      Care Plan: Diabetes Management   Updates made since 9/11/2022 12:00 AM        Problem: Medications         Long-Range Goal: Patient hear to learn about his insulin pump options    Start Date: 10/4/2022   Priority: High   Barriers: No Barriers Identified   Note:    Patient seen in clinic today to learn about his insulin pump options.  Currently on [id number] and has received a [id number] which he has not opened.  Owen wanted to know what his pump options other than Medtronic were.    Reviewed the Dexcom CGM, how to insert was shown stimulator.  He was then shown the T-slim pump with Dexcom integration and Omnipod 5 with Dexcom integration. Questions addressed concerning each pump.  Explained how the hybrid closed loop systems work.  He will inquire about an exchange with his insurance company since he has the new MMM pump.  Will f/u.              Follow Up Plan     Patient will reach out when he has decided which pump he is going to move forward with,     Today's care plan and follow up schedule was discussed with patient.  Owen verbalized understanding of  the care plan, goals, and agrees to follow up plan.        The patient was encouraged to communicate with his/her health care provider/physician and care team regarding his/her condition(s) and treatment.  I provided the patient with my contact information today and encouraged to contact me via phone or [facility]'s Patient Portal as needed.     Length of Visit   Total Time: 60 Minutes

## 2022-10-20 ENCOUNTER — PATIENT MESSAGE (OUTPATIENT)
Dept: DIABETES | Facility: CLINIC | Age: 62
End: 2022-10-20
Payer: COMMERCIAL

## 2022-10-22 NOTE — TELEPHONE ENCOUNTER
Informed pt we rec'd the request for Cialis and it was forwarded to the covering provider. Pt mentioned is asked for a new insulin pump. He spoke to Rachana.

## 2022-10-24 RX ORDER — TADALAFIL 5 MG/1
TABLET ORAL
Qty: 30 TABLET | Refills: 0 | Status: SHIPPED | OUTPATIENT
Start: 2022-10-24

## 2022-11-07 ENCOUNTER — PATIENT MESSAGE (OUTPATIENT)
Dept: DIABETES | Facility: CLINIC | Age: 62
End: 2022-11-07
Payer: COMMERCIAL

## 2022-11-16 ENCOUNTER — OFFICE VISIT (OUTPATIENT)
Dept: SURGERY | Facility: CLINIC | Age: 62
End: 2022-11-16
Attending: SPECIALIST
Payer: COMMERCIAL

## 2022-11-16 VITALS
DIASTOLIC BLOOD PRESSURE: 73 MMHG | WEIGHT: 192 LBS | SYSTOLIC BLOOD PRESSURE: 153 MMHG | HEART RATE: 61 BPM | BODY MASS INDEX: 29.1 KG/M2 | OXYGEN SATURATION: 97 % | HEIGHT: 68 IN

## 2022-11-16 DIAGNOSIS — K42.9 UMBILICAL HERNIA WITHOUT OBSTRUCTION AND WITHOUT GANGRENE: Primary | ICD-10-CM

## 2022-11-16 PROCEDURE — 99999 PR PBB SHADOW E&M-EST. PATIENT-LVL III: ICD-10-PCS | Mod: PBBFAC,,, | Performed by: SPECIALIST

## 2022-11-16 PROCEDURE — 99999 PR PBB SHADOW E&M-EST. PATIENT-LVL III: CPT | Mod: PBBFAC,,, | Performed by: SPECIALIST

## 2022-11-16 PROCEDURE — 99203 OFFICE O/P NEW LOW 30 MIN: CPT | Mod: S$GLB,,, | Performed by: SPECIALIST

## 2022-11-16 PROCEDURE — 99203 PR OFFICE/OUTPT VISIT, NEW, LEVL III, 30-44 MIN: ICD-10-PCS | Mod: S$GLB,,, | Performed by: SPECIALIST

## 2022-11-16 NOTE — PROGRESS NOTES
History & Physical    SUBJECTIVE:     History of Present Illness:  Patient is a 62 y.o. male presents with symptomatic umbilical hernia for repair.  Patient with diabetes mellitus and dyslipidemia, hypothyroidism, hypertension, sleep apnea      Review of patient's allergies indicates:  No Known Allergies    Current Outpatient Medications   Medication Sig Dispense Refill    levothyroxine (SYNTHROID) 200 MCG tablet TAKE 1 TABLET (200 MCG TOTAL) BY MOUTH BEFORE BREAKFAST. 90 tablet 4    lisinopriL 10 MG tablet TAKE 1 TABLET BY MOUTH EVERY DAY 90 tablet 2    rOPINIRole (REQUIP) 0.5 MG tablet TAKE 1 TO 2 TABLETS BY MOUTH DAILY IN THE EVENING 180 tablet 1    simvastatin (ZOCOR) 40 MG tablet TAKE 1 TABLET BY MOUTH EVERY DAY 90 tablet 2    tadalafiL (CIALIS) 5 MG tablet TAKE 1 TABLET BY MOUTH DAILY AS NEEDED FOR ERECTILE DYSFUNCTION. 30 tablet 0    aspirin (ECOTRIN) 81 MG EC tablet Take 1 tablet (81 mg total) by mouth once daily. 1 tablet 0    glucagon (BAQSIMI) 3 mg/actuation Spry Give one puff via nostril. Hold device between fingers and thumb, do not push plunger yet, insert tip gently into one nostril until finger(s) touch the outside of the nose, then push plunger firmly all the way in . Dose is complete when the green line disappears. 2 each 3    insulin lispro 100 unit/mL injection TO BE USED VIA INSULIN PUMP. MAX  UNITS DAILY. USE BEFORE MEALS AND SNACKS ON SLIDING SCALE 30 mL 8    mupirocin (BACTROBAN) 2 % ointment Apply to affected area 2 times daily 22 g 1     No current facility-administered medications for this visit.       Past Medical History:   Diagnosis Date    Diabetes mellitus     DM (diabetes mellitus), type 1, uncontrolled with complications 7/26/2016    Fatigue     Hypertension     Hypothyroidism     Sleep apnea     Type 1 diabetes mellitus with hyperglycemia 8/14/2018     Past Surgical History:   Procedure Laterality Date    COLONOSCOPY N/A 7/31/2017    Procedure: COLONOSCOPY;  Surgeon: Bacilio  MD Bijan;  [facility] ([facility]  Service: Endoscopy;  Laterality: N/A;    KNEE ARTHROSCOPY W/ MENISCAL REPAIR       Family History   Problem Relation Age of Onset    Cancer Father     Colon cancer Neg Hx      Social History     Tobacco Use    Smoking status: Former     Types: Cigarettes     Quit date: 10/10/2008     Years since quittin.1    Smokeless tobacco: Never   Substance Use Topics    Alcohol use: Yes     Comment: ocassionally    Drug use: No        Review of Systems:  Review of Systems   Constitutional:  Negative for activity change, appetite change, chills, diaphoresis, fatigue, fever and unexpected weight change.   HENT:  Negative for congestion, dental problem, drooling, ear discharge, ear pain, facial swelling, hearing loss, mouth sores, nosebleeds, postnasal drip, rhinorrhea, sinus pressure, sinus pain, sneezing, sore throat, tinnitus, trouble swallowing and voice change.    Eyes:  Negative for photophobia, pain, discharge, redness, itching and visual disturbance.   Respiratory:  Negative for apnea, cough, choking, chest tightness, shortness of breath, wheezing and stridor.    Cardiovascular:  Negative for chest pain, palpitations and leg swelling.   Gastrointestinal:  Positive for abdominal pain. Negative for abdominal distention, anal bleeding, blood in stool, constipation, diarrhea, nausea, rectal pain and vomiting.   Endocrine: Negative for cold intolerance, heat intolerance, polydipsia, polyphagia and polyuria.   Genitourinary:  Negative for decreased urine volume, difficulty urinating, dysuria, enuresis, flank pain, frequency, genital sores, hematuria, penile discharge, penile pain, penile swelling, scrotal swelling, testicular pain and urgency.   Musculoskeletal:  Negative for arthralgias, back pain, gait problem, joint swelling, myalgias, neck pain and neck stiffness.   Skin:  Negative for color change, pallor, rash and wound.   Allergic/Immunologic: Negative for environmental  "allergies, food allergies and immunocompromised state.   Neurological:  Negative for dizziness, tremors, seizures, syncope, facial asymmetry, speech difficulty, weakness, light-headedness, numbness and headaches.   Hematological:  Negative for adenopathy. Does not bruise/bleed easily.   Psychiatric/Behavioral:  Negative for agitation, behavioral problems, confusion, decreased concentration, dysphoric mood, hallucinations, self-injury, sleep disturbance and suicidal ideas. The patient is not nervous/anxious and is not hyperactive.      OBJECTIVE:     Vital Signs (Most Recent)  Pulse: 61 (11/16/22 1319)  BP: (!) 153/73 (11/16/22 1319)  SpO2: 97 % (11/16/22 1319)  5' 8" (1.727 m)  87.1 kg (192 lb)     Physical Exam:  Physical Exam  Constitutional:       General: He is not in acute distress.     Appearance: Normal appearance. He is normal weight. He is not ill-appearing, toxic-appearing or diaphoretic.   HENT:      Head: Normocephalic and atraumatic.   Eyes:      General: No scleral icterus.        Right eye: No discharge.         Left eye: No discharge.      Extraocular Movements: Extraocular movements intact.      Conjunctiva/sclera: Conjunctivae normal.   Neck:      Vascular: No carotid bruit.   Cardiovascular:      Rate and Rhythm: Normal rate and regular rhythm.      Pulses: Normal pulses.      Heart sounds: Normal heart sounds. No murmur heard.    No friction rub. No gallop.   Pulmonary:      Effort: Pulmonary effort is normal. No respiratory distress.      Breath sounds: Normal breath sounds. No stridor. No wheezing, rhonchi or rales.   Chest:      Chest wall: No tenderness.   Abdominal:      General: Bowel sounds are normal. There is no distension.      Palpations: Abdomen is soft. There is no mass.      Tenderness: There is no abdominal tenderness. There is no guarding or rebound.      Hernia: A hernia is present.      Comments:   Reducible umbilical hernia   Musculoskeletal:         General: No swelling, " tenderness, deformity or signs of injury. Normal range of motion.      Cervical back: Normal range of motion and neck supple. No rigidity or tenderness.      Right lower leg: No edema.      Left lower leg: No edema.   Lymphadenopathy:      Cervical: No cervical adenopathy.   Skin:     General: Skin is warm and dry.      Coloration: Skin is not jaundiced or pale.      Findings: No bruising, erythema, lesion or rash.   Neurological:      General: No focal deficit present.      Mental Status: He is alert and oriented to person, place, and time.      Motor: No weakness.      Coordination: Coordination normal.      Gait: Gait normal.   Psychiatric:         Mood and Affect: Mood normal.         Behavior: Behavior normal.         Thought Content: Thought content normal.         Judgment: Judgment normal.         ASSESSMENT/PLAN:     Symptomatic umbilical hernia for repair

## 2022-11-17 ENCOUNTER — PATIENT MESSAGE (OUTPATIENT)
Dept: SURGERY | Facility: OTHER | Age: 62
End: 2022-11-17
Payer: COMMERCIAL

## 2022-11-21 ENCOUNTER — PATIENT MESSAGE (OUTPATIENT)
Dept: DIABETES | Facility: CLINIC | Age: 62
End: 2022-11-21
Payer: COMMERCIAL

## 2022-11-29 ENCOUNTER — ANESTHESIA EVENT (OUTPATIENT)
Dept: SURGERY | Facility: OTHER | Age: 62
End: 2022-11-29
Payer: COMMERCIAL

## 2022-11-29 ENCOUNTER — HOSPITAL ENCOUNTER (OUTPATIENT)
Dept: PREADMISSION TESTING | Facility: OTHER | Age: 62
Discharge: HOME | End: 2022-11-29
Attending: SPECIALIST
Payer: COMMERCIAL

## 2022-11-29 VITALS
RESPIRATION RATE: 18 BRPM | WEIGHT: 198.31 LBS | HEIGHT: 68 IN | SYSTOLIC BLOOD PRESSURE: 128 MMHG | HEART RATE: 64 BPM | DIASTOLIC BLOOD PRESSURE: 66 MMHG | OXYGEN SATURATION: 98 % | TEMPERATURE: 98 F | BODY MASS INDEX: 30.05 KG/M2

## 2022-11-29 DIAGNOSIS — Z01.818 PREOP TESTING: ICD-10-CM

## 2022-11-29 LAB
ANION GAP SERPL CALC-SCNC: 4 MMOL/L (ref 8–16)
BASOPHILS # BLD AUTO: 0.04 K/UL (ref 0–0.2)
BASOPHILS NFR BLD: 0.4 % (ref 0–1.9)
BUN SERPL-MCNC: 11 MG/DL (ref 8–23)
CALCIUM SERPL-MCNC: 9.4 MG/DL (ref 8.7–10.5)
CHLORIDE SERPL-SCNC: 106 MMOL/L (ref 95–110)
CO2 SERPL-SCNC: 28 MMOL/L (ref 23–29)
CREAT SERPL-MCNC: 0.9 MG/DL (ref 0.5–1.4)
DIFFERENTIAL METHOD: ABNORMAL
EOSINOPHIL # BLD AUTO: 0.6 K/UL (ref 0–0.5)
EOSINOPHIL NFR BLD: 6.5 % (ref 0–8)
ERYTHROCYTE [DISTWIDTH] IN BLOOD BY AUTOMATED COUNT: 13.2 % (ref 11.5–14.5)
EST. GFR  (NO RACE VARIABLE): >60 ML/MIN/1.73 M^2
GLUCOSE SERPL-MCNC: 100 MG/DL (ref 70–110)
HCT VFR BLD AUTO: 43 % (ref 40–54)
HGB BLD-MCNC: 14.7 G/DL (ref 14–18)
IMM GRANULOCYTES # BLD AUTO: 0.02 K/UL (ref 0–0.04)
IMM GRANULOCYTES NFR BLD AUTO: 0.2 % (ref 0–0.5)
LYMPHOCYTES # BLD AUTO: 2.4 K/UL (ref 1–4.8)
LYMPHOCYTES NFR BLD: 27.2 % (ref 18–48)
MCH RBC QN AUTO: 35.2 PG (ref 27–31)
MCHC RBC AUTO-ENTMCNC: 34.2 G/DL (ref 32–36)
MCV RBC AUTO: 103 FL (ref 82–98)
MONOCYTES # BLD AUTO: 0.7 K/UL (ref 0.3–1)
MONOCYTES NFR BLD: 7.6 % (ref 4–15)
NEUTROPHILS # BLD AUTO: 5.2 K/UL (ref 1.8–7.7)
NEUTROPHILS NFR BLD: 58.1 % (ref 38–73)
NRBC BLD-RTO: 0 /100 WBC
PLATELET # BLD AUTO: 179 K/UL (ref 150–450)
PLATELET BLD QL SMEAR: ABNORMAL
PMV BLD AUTO: 12.5 FL (ref 9.2–12.9)
POTASSIUM SERPL-SCNC: 4.6 MMOL/L (ref 3.5–5.1)
RBC # BLD AUTO: 4.18 M/UL (ref 4.6–6.2)
SODIUM SERPL-SCNC: 138 MMOL/L (ref 136–145)
WBC # BLD AUTO: 8.95 K/UL (ref 3.9–12.7)

## 2022-11-29 PROCEDURE — 85025 COMPLETE CBC W/AUTO DIFF WBC: CPT | Performed by: ANESTHESIOLOGY

## 2022-11-29 PROCEDURE — 36415 COLL VENOUS BLD VENIPUNCTURE: CPT | Performed by: ANESTHESIOLOGY

## 2022-11-29 PROCEDURE — 80048 BASIC METABOLIC PNL TOTAL CA: CPT | Performed by: ANESTHESIOLOGY

## 2022-11-29 RX ORDER — SODIUM CHLORIDE, SODIUM LACTATE, POTASSIUM CHLORIDE, CALCIUM CHLORIDE 600; 310; 30; 20 MG/100ML; MG/100ML; MG/100ML; MG/100ML
INJECTION, SOLUTION INTRAVENOUS CONTINUOUS
Status: CANCELLED | OUTPATIENT
Start: 2022-11-29

## 2022-11-29 RX ORDER — LIDOCAINE HYDROCHLORIDE 10 MG/ML
0.5 INJECTION, SOLUTION EPIDURAL; INFILTRATION; INTRACAUDAL; PERINEURAL ONCE
Status: CANCELLED | OUTPATIENT
Start: 2022-11-29 | End: 2022-11-29

## 2022-11-29 RX ORDER — LANOLIN ALCOHOL/MO/W.PET/CERES
1000 CREAM (GRAM) TOPICAL NIGHTLY
COMMUNITY
Start: 2022-10-19

## 2022-11-29 RX ORDER — ACETAMINOPHEN 325 MG/1
975 TABLET ORAL
Status: CANCELLED | OUTPATIENT
Start: 2022-11-29 | End: 2022-11-29

## 2022-11-29 RX ORDER — CLONAZEPAM 1 MG/1
0.5 TABLET ORAL NIGHTLY
COMMUNITY
Start: 2022-10-28 | End: 2023-02-28

## 2022-11-29 RX ORDER — ZOLPIDEM TARTRATE 10 MG/1
10 TABLET ORAL NIGHTLY PRN
COMMUNITY
Start: 2022-08-24 | End: 2022-11-29 | Stop reason: CLARIF

## 2022-11-29 RX ORDER — ACETAMINOPHEN 325 MG/1
325 TABLET ORAL EVERY 4 HOURS PRN
COMMUNITY
Start: 2022-09-26 | End: 2023-02-28

## 2022-11-29 NOTE — ANESTHESIA PREPROCEDURE EVALUATION
11/29/2022  Owen Radford is a 62 y.o., male.      Pre-op Assessment    I have reviewed the Patient Summary Reports.     I have reviewed the Nursing Notes. I have reviewed the NPO Status.   I have reviewed the Medications.     Review of Systems  Anesthesia Hx:  Denies Family Hx of Anesthesia complications.   Denies Personal Hx of Anesthesia complications.   Social:  Non-Smoker    Hematology/Oncology:  Hematology Normal   Oncology Normal     EENT/Dental:EENT/Dental Normal   Cardiovascular:   Hypertension, well controlled    Pulmonary:   Sleep Apnea, CPAP    Renal/:  Renal/ Normal     Hepatic/GI:  Hepatic/GI Normal    Musculoskeletal:  Musculoskeletal Normal    Neurological:   Neuromuscular Disease, (restless leg syndrome)    Endocrine:   Diabetes (on pump), type 1 Hypothyroidism    Dermatological:  Skin Normal    Psych:  Psychiatric Normal           Physical Exam  General: Well nourished, Cooperative, Alert and Oriented    Airway:  Mallampati: II   Mouth Opening: Normal  TM Distance: Normal  Neck ROM: Normal ROM    Dental:  Intact        Anesthesia Plan  Type of Anesthesia, risks & benefits discussed:    Anesthesia Type: Gen ETT  Intra-op Monitoring Plan: Standard ASA Monitors  Post Op Pain Control Plan: multimodal analgesia, IV/PO Opioids PRN and peripheral nerve block  Induction:  IV  Airway Plan: Video  Informed Consent: Informed consent signed with the Patient and all parties understand the risks and agree with anesthesia plan.  All questions answered.   ASA Score: 3  Anesthesia Plan Notes: EKG 2020, SB 58  Labs WNL    Day of surgery update: pump off in HA, glucose 130 at the time    Ready For Surgery From Anesthesia Perspective.     .

## 2022-11-29 NOTE — DISCHARGE INSTRUCTIONS

## 2022-11-29 NOTE — DISCHARGE INSTRUCTIONS
Information to Prepare you for your Surgery    PRE-ADMIT TESTING -  [phone]    [address] [facility]          Your surgery has been scheduled at [facility]. We are pleased to have the opportunity to serve you. For Further Information please call [phone].    On the day of surgery please report to the Information Desk on the 1st floor.    CONTACT YOUR PHYSICIAN'S OFFICE THE DAY PRIOR TO YOUR SURGERY TO OBTAIN YOUR ARRIVAL TIME.     The evening before surgery do not eat anything after 9 p.m. ( this includes hard candy, chewing gum and mints).  You may only have GATORADE, POWERADE AND WATER  from 9 p.m. until you leave your home.   DO NOT DRINK ANY LIQUIDS ON THE WAY TO THE HOSPITAL.      Why does your anesthesiologist allow you to drink Gatorade/Powerade before surgery?  Gatorade/Powerade helps to increase your comfort before surgery and to decrease your nausea after surgery. The carbohydrates in Gatorade/Powerade help reduce your body's stress response to surgery.  If you are a diabetic-drink only water prior to surgery.      Current Visitor policy(12/27/2021) - Patients may have 2 visitors pre and post procedure. Only 2 visitors will be allowed in the Surgical building with the patient.     SPECIAL MEDICATION INSTRUCTIONS: TAKE medications checked off by the Anesthesiologist on your Medication List.    Angiogram Patients: Take medications as instructed by your physician, including aspirin.     Surgery Patients:    If you take ASPIRIN - Your PHYSICIAN/SURGEON will need to inform you IF/OR when you need to stop taking aspirin prior to your surgery.     The week prior to surgery do not ot take any medications containing IBUPROFEN or NSAIDS ( Advil, Motrin, Goodys, BC, Aleve, Naproxen etc) If you are not sure if you should take a medicine please call your surgeon's office.  Ok to take Tylenol    Do Not Wear any make-up (especially eye make-up) to  surgery. Please remove any false eyelashes or eyelash extensions. If you arrive the day of surgery with makeup/eyelashes on you will be required to remove prior to surgery. (There is a risk of corneal abrasions if eye makeup/eyelash extensions are not removed)      Leave all valuables at home.   Do Not wear any jewelry or watches, including any metal in body piercings. Jewelry must be removed prior to coming to the hospital.  There is a possibility that rings that are unable to be removed may be cut off if they are on the surgical extremity.    Please remove all hair extensions, wigs, clips and any other metal accessories/ ornaments from your hair.  These items may pose a flammable/fire risk in Surgery and must be removed.    Do not shave your surgical area at least 5 days prior to your surgery. The surgical prep will be performed at the hospital according to Infection Control regulations.    Contact Lens must be removed before surgery. Either do not wear the contact lens or bring a case and solution for storage.  Please bring a container for eyeglasses or dentures as required.  Bring any paperwork your physician has provided, such as consent forms,  history and physicals, doctor's orders, etc.   Bring comfortable clothes that are loose fitting to wear upon discharge. Take into consideration the type of surgery being performed.  Maintain your diet as advised per your physician the day prior to surgery.      Adequate rest the night before surgery is advised.   Park in the Parking lot behind the hospital or in the [place] [facility] across the street from the parking lot. Parking is complimentary.  If you will be discharged the same day as your procedure, please arrange for a responsible adult to drive you home or to accompany you if traveling by taxi.   YOU WILL NOT BE PERMITTED TO DRIVE OR TO LEAVE THE HOSPITAL ALONE AFTER SURGERY.   If you are being discharged the same day, it is strongly recommended that you  arrange for someone to remain with you for the first 24 hrs following your surgery.    The Surgeon will speak to your family/visitor after your surgery regarding the outcome of your surgery and post op care.  The Surgeon may speak to you after your surgery, but there is a possibility you may not remember the details.  Please check with your family members regarding the conversation with the Surgeon.    We strongly recommend whoever is bringing you home be present for discharge instructions.  This will ensure a thorough understanding for your post op home care.    ALL CHILDREN MUST ALWAYS BE ACCOMPANIED BY AN ADULT.    Visitors-Refer to current Visitor policy handouts.    Thank you for your cooperation.  The Staff of [facility].            Bathing Instructions with Hibiclens    Shower the evening before and morning of your procedure with Chlorhexidine (Hibiclens)  do not use Chlorhexidine on your face or genitals. Do not get in your eyes.  Wash your face with water and your regular face wash/soap  Use your regular shampoo  Apply Chlorhexidine (Hibiclens) directly on your skin or on a wet washcloth and wash gently. When showering: Move away from the shower stream when applying Chlorhexidine (Hibiclens) to avoid rinsing off too soon.  Rinse thoroughly with warm water  Do not dilute Chlorhexidine (Hibiclens)   Dry off as usual, do not use any deodorant, powder, body lotions, perfume, after shave or cologne.

## 2022-12-01 ENCOUNTER — ANESTHESIA (OUTPATIENT)
Dept: SURGERY | Facility: OTHER | Age: 62
End: 2022-12-01
Payer: COMMERCIAL

## 2022-12-02 ENCOUNTER — PATIENT MESSAGE (OUTPATIENT)
Dept: DIABETES | Facility: CLINIC | Age: 62
End: 2022-12-02
Payer: COMMERCIAL

## 2022-12-14 ENCOUNTER — CLINICAL SUPPORT (OUTPATIENT)
Dept: DIABETES | Facility: CLINIC | Age: 62
End: 2022-12-14
Payer: COMMERCIAL

## 2022-12-14 PROCEDURE — G0108 DIAB MANAGE TRN  PER INDIV: HCPCS | Mod: S$GLB,,, | Performed by: DIETITIAN, REGISTERED

## 2022-12-14 PROCEDURE — G0108 PR DIAB MANAGE TRN  PER INDIV: ICD-10-PCS | Mod: S$GLB,,, | Performed by: DIETITIAN, REGISTERED

## 2022-12-14 NOTE — PROGRESS NOTES
"Diabetes Care Specialist Progress Note  Author: Angelita Alejandro RD, CDE  Date: 12/14/2022    Program Intake  Reason for Diabetes Program Visit:: Intervention  Type of Intervention:: Individual  Individual: Device Training  Device Training: Insulin Pump Upgrade    Lab Results   Component Value Date    HGBA1C 6.3 (H) 08/10/2022     Patient here today for insulin pump training. Pump training was provided per Tandem protocol. Details of pump therapy were covered with the patient.  Instructed and assisted in programming pump following  t-slim " Reference Guide  " step by step guide. Patient was on Medtronic 670 pump and is upgrading to Tandem Control IQ.  Pump Options menu's on home screen were reviewed in detail.   Practiced with patient:  Filling and loading t-slim cartridge, filling tubing, and filling canula after inserting infusion set  Insertion of Autosoft XC infusion set; connecting and disconnecting infusion set.   Use of bolus menu: entering Blood glucose and carbs, and delivering bolus .  Patient demonstrated the ability to fill and load  t-slim cartridge,  fill tubing, insert infusion set and fill cannula adequately per sterile technique.   Patient inserted the infusion set with aseptic technique and secured it to left abdomen area.    Reviewed site selection, rotation. Instructed pump will alert to cartridge, infusion set and site every three days .   Discussed storage of insulin and back-up plan if pump is broken or fails.  Reviewed treatment of hypoglycemia, hyperglycemia and troubleshooting of pump.    INITIAL SETTINGS were pulled from his Medtronic Pump   Basal rate:  12 am - 6 am - 1.05  6 am - 12 pm - 2.05  12 pm - 3 pm - 2.25  3 pm - 7 pm - 1.95  7 pm - 12 am - 1.75    Bolus settings :  Glucose target: 110  Correction Factor: 40  Carb Ratio: 10  Active insulin: 5 hours  Max Bolus: 25  Max basal: 3  Auto off: Off    T Connect: UN: [email or web address]                     PW: " [email or web address]!      DEXCOM G6 CGM TRAINING     Patient referred to clinic today for Dexcom G6 continuous glucose sensor system training.  Patient arrived with  fully charged and Dexcom G6 mobile wil downloaded to phone. Education was provided using "Quick Start Guide" per Dexcom protocol.     Pt will be using his phone as the primary .  Overview:  5min glucose reading updates, trending arrows, BG graph screens, battery life indicator, Blue Tooth Symbol.  Menus: trend Graph, start sensor, enter BG, events, Alerts, Settings, Shutdown, Stop Sensor   Settings:                          * Urgent Low: 55 mg/dL                          * Urgent Low Soon: on                          * Low alert: 80 mg/dl repeat 30 min                          * High alert: 250 mg/dl repeat 120 min                          * Rise rate: off                          * Fall Rate: off                          * Signal Loss: on repeat 20 min                          * No Reading: on repeat 20 min                          * Always sound: on                             Reviewed additional setting options with patient, including Graph Height and Transmitter ID. Transmitter was paired with .    Reviewed where to find sensor insertion time and sensor expiration date.   Discussed no need to calibrate sensor during the entirety of the 10 day wear. Discussed that pt can calibrate sensor if desired, but at that time she will need to continue calibrating every 12 hours for sensor to remain accurate. Reviewed appropriate calibration techniques.  Reviewed sensor site selection. Site selected and prepped using aseptic technique Inserted to right abdomen. Transmitter placed in pod and secured.  Practiced sensor pod/transmitter removal from site, and removal of transmitter from sensor pod.  Patient able to demonstrate without difficulty.  Encouraged to review manual prior to starting another sensor.   Reviewed problem solving aspects " of sensor transmission/ variables that can disrupt RF transmission.  range 20 feet, but the first 3 hrs keep within 3 feet of transmitter.  Pt instructed on lag time of interstitial fluid from CBG and was advised to tx hypoglycemia and dose insulin based on SMBG values.  Dexcom technical support contact number given and examples of when to contact them discussed. Pt linked to [employer].  Patient advised to always check glucose with glucometer should readings do not match symptoms felt (ex. Hypo or hyperglycemia).      [employer] UN: [email or web address]                  PW: [email or web address]!    Assessment Summary and Plan    Based on today's diabetes care assessment, the following areas of need were identified:      Social 10/4/2022   Support No   Access to Mass Media/Tech No   Cognitive/Behavioral Health No   Culture/Scientology No   Communication No   Health Literacy No              Diabetes Self-Management Skills 10/4/2022   Diabetes Disease Process/Treatment Options No   Medication No        Today's interventions were provided through individual discussion, instruction, and written materials were provided.      Patient verbalized understanding of instruction and written materials.  Pt was able to return back demonstration of instructions today. Patient understood key points, needs reinforcement and further instruction.     Diabetes Self-Management Care Plan:    Today's Diabetes Self-Management Care Plan was developed with Owen's input. Owen has agreed to work toward the following goal(s) to improve his/her overall diabetes control.      Care Plan: Diabetes Management   Updates made since 11/14/2022 12:00 AM        Problem: Medications         Goal: Use Tandem pump as instructed    Start Date: 12/14/2022   Expected End Date: 3/14/2023   Priority: High   Barriers: No Barriers Identified     Task: Instructed patient on the Tandem Pump Completed 12/14/2022         Follow Up Plan     Follow up in about 13 days  (around 12/27/2022).    Today's care plan and follow up schedule was discussed with patient.  Owen verbalized understanding of the care plan, goals, and agrees to follow up plan.        The patient was encouraged to communicate with his/her health care provider/physician and care team regarding his/her condition(s) and treatment.  I provided the patient with my contact information today and encouraged to contact me via phone or [facility] Patient Portal as needed.     Length of Visit   Total Time: 120 Minutes

## 2022-12-19 NOTE — H&P
Physical     SUBJECTIVE:      History of Present Illness:  Patient is a 62 y.o. male presents with symptomatic umbilical hernia for repair.  Patient with diabetes mellitus and dyslipidemia, hypothyroidism, hypertension, sleep apnea        Review of patient's allergies indicates:  No Known Allergies     Current Medications          Current Outpatient Medications   Medication Sig Dispense Refill    levothyroxine (SYNTHROID) 200 MCG tablet TAKE 1 TABLET (200 MCG TOTAL) BY MOUTH BEFORE BREAKFAST. 90 tablet 4    lisinopriL 10 MG tablet TAKE 1 TABLET BY MOUTH EVERY DAY 90 tablet 2    rOPINIRole (REQUIP) 0.5 MG tablet TAKE 1 TO 2 TABLETS BY MOUTH DAILY IN THE EVENING 180 tablet 1    simvastatin (ZOCOR) 40 MG tablet TAKE 1 TABLET BY MOUTH EVERY DAY 90 tablet 2    tadalafiL (CIALIS) 5 MG tablet TAKE 1 TABLET BY MOUTH DAILY AS NEEDED FOR ERECTILE DYSFUNCTION. 30 tablet 0    aspirin (ECOTRIN) 81 MG EC tablet Take 1 tablet (81 mg total) by mouth once daily. 1 tablet 0    glucagon (BAQSIMI) 3 mg/actuation Spry Give one puff via nostril. Hold device between fingers and thumb, do not push plunger yet, insert tip gently into one nostril until finger(s) touch the outside of the nose, then push plunger firmly all the way in . Dose is complete when the green line disappears. 2 each 3    insulin lispro 100 unit/mL injection TO BE USED VIA INSULIN PUMP. MAX  UNITS DAILY. USE BEFORE MEALS AND SNACKS ON SLIDING SCALE 30 mL 8    mupirocin (BACTROBAN) 2 % ointment Apply to affected area 2 times daily 22 g 1      No current facility-administered medications for this visit.                 Past Medical History:   Diagnosis Date    Diabetes mellitus      DM (diabetes mellitus), type 1, uncontrolled with complications 7/26/2016    Fatigue      Hypertension      Hypothyroidism      Sleep apnea      Type 1 diabetes mellitus with hyperglycemia 8/14/2018            Past Surgical History:   Procedure Laterality Date    COLONOSCOPY N/A  2017     Procedure: COLONOSCOPY;  Surgeon: Bacilio Thakkar MD;  Location: [facility] ([address] [place] [address]);  Service: Endoscopy;  Laterality: N/A;    KNEE ARTHROSCOPY W/ MENISCAL REPAIR                Family History   Problem Relation Age of Onset    Cancer Father      Colon cancer Neg Hx        Social History            Tobacco Use    Smoking status: Former       Types: Cigarettes       Quit date: 10/10/2008       Years since quittin.1    Smokeless tobacco: Never   Substance Use Topics    Alcohol use: Yes       Comment: ocassionally    Drug use: No         Review of Systems:  Review of Systems   Constitutional:  Negative for activity change, appetite change, chills, diaphoresis, fatigue, fever and unexpected weight change.   HENT:  Negative for congestion, dental problem, drooling, ear discharge, ear pain, facial swelling, hearing loss, mouth sores, nosebleeds, postnasal drip, rhinorrhea, sinus pressure, sinus pain, sneezing, sore throat, tinnitus, trouble swallowing and voice change.    Eyes:  Negative for photophobia, pain, discharge, redness, itching and visual disturbance.   Respiratory:  Negative for apnea, cough, choking, chest tightness, shortness of breath, wheezing and stridor.    Cardiovascular:  Negative for chest pain, palpitations and leg swelling.   Gastrointestinal:  Positive for abdominal pain. Negative for abdominal distention, anal bleeding, blood in stool, constipation, diarrhea, nausea, rectal pain and vomiting.   Endocrine: Negative for cold intolerance, heat intolerance, polydipsia, polyphagia and polyuria.   Genitourinary:  Negative for decreased urine volume, difficulty urinating, dysuria, enuresis, flank pain, frequency, genital sores, hematuria, penile discharge, penile pain, penile swelling, scrotal swelling, testicular pain and urgency.   Musculoskeletal:  Negative for arthralgias, back pain, gait problem, joint swelling, myalgias, neck pain and neck stiffness.   Skin:  Negative for color  "change, pallor, rash and wound.   Allergic/Immunologic: Negative for environmental allergies, food allergies and immunocompromised state.   Neurological:  Negative for dizziness, tremors, seizures, syncope, facial asymmetry, speech difficulty, weakness, light-headedness, numbness and headaches.   Hematological:  Negative for adenopathy. Does not bruise/bleed easily.   Psychiatric/Behavioral:  Negative for agitation, behavioral problems, confusion, decreased concentration, dysphoric mood, hallucinations, self-injury, sleep disturbance and suicidal ideas. The patient is not nervous/anxious and is not hyperactive.       OBJECTIVE:      Vital Signs (Most Recent)  Pulse: 61 (11/16/22 1319)  BP: (!) 153/73 (11/16/22 1319)  SpO2: 97 % (11/16/22 1319)  5' 8" (1.727 m)  87.1 kg (192 lb)      Physical Exam:  Physical Exam  Constitutional:       General: He is not in acute distress.     Appearance: Normal appearance. He is normal weight. He is not ill-appearing, toxic-appearing or diaphoretic.   HENT:      Head: Normocephalic and atraumatic.   Eyes:      General: No scleral icterus.        Right eye: No discharge.         Left eye: No discharge.      Extraocular Movements: Extraocular movements intact.      Conjunctiva/sclera: Conjunctivae normal.   Neck:      Vascular: No carotid bruit.   Cardiovascular:      Rate and Rhythm: Normal rate and regular rhythm.      Pulses: Normal pulses.      Heart sounds: Normal heart sounds. No murmur heard.    No friction rub. No gallop.   Pulmonary:      Effort: Pulmonary effort is normal. No respiratory distress.      Breath sounds: Normal breath sounds. No stridor. No wheezing, rhonchi or rales.   Chest:      Chest wall: No tenderness.   Abdominal:      General: Bowel sounds are normal. There is no distension.      Palpations: Abdomen is soft. There is no mass.      Tenderness: There is no abdominal tenderness. There is no guarding or rebound.      Hernia: A hernia is present.      " Comments:   Reducible umbilical hernia   Musculoskeletal:         General: No swelling, tenderness, deformity or signs of injury. Normal range of motion.      Cervical back: Normal range of motion and neck supple. No rigidity or tenderness.      Right lower leg: No edema.      Left lower leg: No edema.   Lymphadenopathy:      Cervical: No cervical adenopathy.   Skin:     General: Skin is warm and dry.      Coloration: Skin is not jaundiced or pale.      Findings: No bruising, erythema, lesion or rash.   Neurological:      General: No focal deficit present.      Mental Status: He is alert and oriented to person, place, and time.      Motor: No weakness.      Coordination: Coordination normal.      Gait: Gait normal.   Psychiatric:         Mood and Affect: Mood normal.         Behavior: Behavior normal.         Thought Content: Thought content normal.         Judgment: Judgment normal.            ASSESSMENT/PLAN:      Symptomatic umbilical hernia for repair

## 2022-12-20 ENCOUNTER — HOSPITAL ENCOUNTER (OUTPATIENT)
Facility: OTHER | Age: 62
Discharge: HOME | End: 2022-12-20
Attending: SPECIALIST | Admitting: SPECIALIST
Payer: COMMERCIAL

## 2022-12-20 ENCOUNTER — PATIENT MESSAGE (OUTPATIENT)
Dept: INTERNAL MEDICINE | Facility: CLINIC | Age: 62
End: 2022-12-20
Payer: COMMERCIAL

## 2022-12-20 DIAGNOSIS — Z01.818 PREOP TESTING: Primary | ICD-10-CM

## 2022-12-20 DIAGNOSIS — K42.9 UMBILICAL HERNIA WITHOUT OBSTRUCTION AND WITHOUT GANGRENE: ICD-10-CM

## 2022-12-20 PROCEDURE — 36000707: Performed by: SPECIALIST

## 2022-12-20 PROCEDURE — 63600175 PHARM REV CODE 636 W HCPCS: Performed by: STUDENT IN AN ORGANIZED HEALTH CARE EDUCATION/TRAINING PROGRAM

## 2022-12-20 PROCEDURE — 37000009 HC ANESTHESIA EA ADD 15 MINS: Performed by: SPECIALIST

## 2022-12-20 PROCEDURE — 63600175 PHARM REV CODE 636 W HCPCS: Performed by: ANESTHESIOLOGY

## 2022-12-20 PROCEDURE — 71000016 HC POSTOP RECOV ADDL HR: Performed by: SPECIALIST

## 2022-12-20 PROCEDURE — 71000033 HC RECOVERY, INTIAL HOUR: Performed by: SPECIALIST

## 2022-12-20 PROCEDURE — 63600175 PHARM REV CODE 636 W HCPCS

## 2022-12-20 PROCEDURE — 25000003 PHARM REV CODE 250: Performed by: SPECIALIST

## 2022-12-20 PROCEDURE — 37000008 HC ANESTHESIA 1ST 15 MINUTES: Performed by: SPECIALIST

## 2022-12-20 PROCEDURE — 25000003 PHARM REV CODE 250: Performed by: STUDENT IN AN ORGANIZED HEALTH CARE EDUCATION/TRAINING PROGRAM

## 2022-12-20 PROCEDURE — 71000015 HC POSTOP RECOV 1ST HR: Performed by: SPECIALIST

## 2022-12-20 PROCEDURE — 49585 PR REPAIR UMBILICAL HERN,5+Y/O,REDUC: CPT | Mod: ,,, | Performed by: SPECIALIST

## 2022-12-20 PROCEDURE — 63600175 PHARM REV CODE 636 W HCPCS: Performed by: SPECIALIST

## 2022-12-20 PROCEDURE — 49585 PR REPAIR UMBILICAL HERN,5+Y/O,REDUC: ICD-10-PCS | Mod: ,,, | Performed by: SPECIALIST

## 2022-12-20 PROCEDURE — 64488 TAP BLOCK BI INJECTION: CPT | Performed by: ANESTHESIOLOGY

## 2022-12-20 PROCEDURE — 25000003 PHARM REV CODE 250: Performed by: ANESTHESIOLOGY

## 2022-12-20 PROCEDURE — 36000706: Performed by: SPECIALIST

## 2022-12-20 PROCEDURE — 27201423 OPTIME MED/SURG SUP & DEVICES STERILE SUPPLY: Performed by: SPECIALIST

## 2022-12-20 RX ORDER — ACETAMINOPHEN 325 MG/1
975 TABLET ORAL
Status: COMPLETED | OUTPATIENT
Start: 2022-12-20 | End: 2022-12-20

## 2022-12-20 RX ORDER — EPHEDRINE SULFATE 50 MG/ML
INJECTION, SOLUTION INTRAVENOUS
Status: DISCONTINUED | OUTPATIENT
Start: 2022-12-20 | End: 2022-12-20

## 2022-12-20 RX ORDER — LIDOCAINE HYDROCHLORIDE 10 MG/ML
0.5 INJECTION, SOLUTION EPIDURAL; INFILTRATION; INTRACAUDAL; PERINEURAL ONCE
Status: DISCONTINUED | OUTPATIENT
Start: 2022-12-20 | End: 2022-12-20 | Stop reason: HOSPADM

## 2022-12-20 RX ORDER — SODIUM CHLORIDE, SODIUM LACTATE, POTASSIUM CHLORIDE, CALCIUM CHLORIDE 600; 310; 30; 20 MG/100ML; MG/100ML; MG/100ML; MG/100ML
INJECTION, SOLUTION INTRAVENOUS CONTINUOUS
Status: DISCONTINUED | OUTPATIENT
Start: 2022-12-20 | End: 2022-12-20 | Stop reason: HOSPADM

## 2022-12-20 RX ORDER — SODIUM CHLORIDE 0.9 % (FLUSH) 0.9 %
3 SYRINGE (ML) INJECTION
Status: DISCONTINUED | OUTPATIENT
Start: 2022-12-20 | End: 2022-12-20 | Stop reason: HOSPADM

## 2022-12-20 RX ORDER — ROPIVACAINE HYDROCHLORIDE 5 MG/ML
INJECTION, SOLUTION EPIDURAL; INFILTRATION; PERINEURAL
Status: COMPLETED | OUTPATIENT
Start: 2022-12-20 | End: 2022-12-20

## 2022-12-20 RX ORDER — SODIUM CHLORIDE 9 MG/ML
INJECTION, SOLUTION INTRAVENOUS CONTINUOUS
Status: DISCONTINUED | OUTPATIENT
Start: 2022-12-20 | End: 2022-12-20 | Stop reason: HOSPADM

## 2022-12-20 RX ORDER — ROCURONIUM BROMIDE 10 MG/ML
INJECTION, SOLUTION INTRAVENOUS
Status: DISCONTINUED | OUTPATIENT
Start: 2022-12-20 | End: 2022-12-20

## 2022-12-20 RX ORDER — PHENYLEPHRINE HYDROCHLORIDE 10 MG/ML
INJECTION INTRAVENOUS
Status: DISCONTINUED | OUTPATIENT
Start: 2022-12-20 | End: 2022-12-20

## 2022-12-20 RX ORDER — FENTANYL CITRATE 50 UG/ML
INJECTION, SOLUTION INTRAMUSCULAR; INTRAVENOUS
Status: DISCONTINUED | OUTPATIENT
Start: 2022-12-20 | End: 2022-12-20

## 2022-12-20 RX ORDER — ONDANSETRON 2 MG/ML
INJECTION INTRAMUSCULAR; INTRAVENOUS
Status: DISCONTINUED | OUTPATIENT
Start: 2022-12-20 | End: 2022-12-20

## 2022-12-20 RX ORDER — PROCHLORPERAZINE EDISYLATE 5 MG/ML
5 INJECTION INTRAMUSCULAR; INTRAVENOUS EVERY 30 MIN PRN
Status: DISCONTINUED | OUTPATIENT
Start: 2022-12-20 | End: 2022-12-20 | Stop reason: HOSPADM

## 2022-12-20 RX ORDER — CEFAZOLIN SODIUM 1 G/3ML
2 INJECTION, POWDER, FOR SOLUTION INTRAMUSCULAR; INTRAVENOUS
Status: COMPLETED | OUTPATIENT
Start: 2022-12-20 | End: 2022-12-20

## 2022-12-20 RX ORDER — MIDAZOLAM HYDROCHLORIDE 1 MG/ML
INJECTION INTRAMUSCULAR; INTRAVENOUS
Status: DISCONTINUED | OUTPATIENT
Start: 2022-12-20 | End: 2022-12-20

## 2022-12-20 RX ORDER — LIDOCAINE HYDROCHLORIDE 10 MG/ML
1 INJECTION, SOLUTION EPIDURAL; INFILTRATION; INTRACAUDAL; PERINEURAL ONCE
Status: DISCONTINUED | OUTPATIENT
Start: 2022-12-20 | End: 2022-12-20 | Stop reason: HOSPADM

## 2022-12-20 RX ORDER — BUPIVACAINE HCL/EPINEPHRINE 0.25-.0005
VIAL (ML) INJECTION
Status: DISCONTINUED | OUTPATIENT
Start: 2022-12-20 | End: 2022-12-20 | Stop reason: HOSPADM

## 2022-12-20 RX ORDER — DEXAMETHASONE SODIUM PHOSPHATE 4 MG/ML
INJECTION, SOLUTION INTRA-ARTICULAR; INTRALESIONAL; INTRAMUSCULAR; INTRAVENOUS; SOFT TISSUE
Status: DISCONTINUED | OUTPATIENT
Start: 2022-12-20 | End: 2022-12-20

## 2022-12-20 RX ORDER — DIPHENHYDRAMINE HYDROCHLORIDE 50 MG/ML
12.5 INJECTION INTRAMUSCULAR; INTRAVENOUS EVERY 30 MIN PRN
Status: DISCONTINUED | OUTPATIENT
Start: 2022-12-20 | End: 2022-12-20 | Stop reason: HOSPADM

## 2022-12-20 RX ORDER — OXYCODONE AND ACETAMINOPHEN 7.5; 325 MG/1; MG/1
1 TABLET ORAL EVERY 4 HOURS PRN
Qty: 20 TABLET | Refills: 0 | Status: SHIPPED | OUTPATIENT
Start: 2022-12-20 | End: 2023-02-28

## 2022-12-20 RX ORDER — PROPOFOL 10 MG/ML
VIAL (ML) INTRAVENOUS
Status: DISCONTINUED | OUTPATIENT
Start: 2022-12-20 | End: 2022-12-20

## 2022-12-20 RX ORDER — SIMVASTATIN 40 MG/1
TABLET, FILM COATED ORAL
Qty: 90 TABLET | Refills: 2 | Status: SHIPPED | OUTPATIENT
Start: 2022-12-20 | End: 2023-10-16

## 2022-12-20 RX ORDER — OXYCODONE HYDROCHLORIDE 5 MG/1
5 TABLET ORAL
Status: DISCONTINUED | OUTPATIENT
Start: 2022-12-20 | End: 2022-12-20 | Stop reason: HOSPADM

## 2022-12-20 RX ORDER — LIDOCAINE HYDROCHLORIDE 20 MG/ML
INJECTION INTRAVENOUS
Status: DISCONTINUED | OUTPATIENT
Start: 2022-12-20 | End: 2022-12-20

## 2022-12-20 RX ORDER — HYDROMORPHONE HYDROCHLORIDE 2 MG/ML
0.4 INJECTION, SOLUTION INTRAMUSCULAR; INTRAVENOUS; SUBCUTANEOUS EVERY 5 MIN PRN
Status: DISCONTINUED | OUTPATIENT
Start: 2022-12-20 | End: 2022-12-20 | Stop reason: HOSPADM

## 2022-12-20 RX ADMIN — GLYCOPYRROLATE 0.2 MG: 0.2 INJECTION, SOLUTION INTRAMUSCULAR; INTRAVITREAL at 07:12

## 2022-12-20 RX ADMIN — PHENYLEPHRINE HYDROCHLORIDE 100 MCG: 10 INJECTION INTRAVENOUS at 07:12

## 2022-12-20 RX ADMIN — OXYCODONE 5 MG: 5 TABLET ORAL at 09:12

## 2022-12-20 RX ADMIN — ACETAMINOPHEN 975 MG: 325 TABLET, FILM COATED ORAL at 06:12

## 2022-12-20 RX ADMIN — CEFAZOLIN 2 G: 330 INJECTION, POWDER, FOR SOLUTION INTRAMUSCULAR; INTRAVENOUS at 07:12

## 2022-12-20 RX ADMIN — FENTANYL CITRATE 100 MCG: 50 INJECTION, SOLUTION INTRAMUSCULAR; INTRAVENOUS at 06:12

## 2022-12-20 RX ADMIN — ONDANSETRON HYDROCHLORIDE 4 MG: 2 INJECTION INTRAMUSCULAR; INTRAVENOUS at 07:12

## 2022-12-20 RX ADMIN — ROPIVACAINE HYDROCHLORIDE 30 ML: 5 INJECTION, SOLUTION EPIDURAL; INFILTRATION; PERINEURAL at 06:12

## 2022-12-20 RX ADMIN — SUGAMMADEX 200 MG: 100 INJECTION, SOLUTION INTRAVENOUS at 07:12

## 2022-12-20 RX ADMIN — EPHEDRINE SULFATE 10 MG: 50 INJECTION INTRAVENOUS at 07:12

## 2022-12-20 RX ADMIN — MIDAZOLAM HYDROCHLORIDE 2 MG: 1 INJECTION, SOLUTION INTRAMUSCULAR; INTRAVENOUS at 06:12

## 2022-12-20 RX ADMIN — SODIUM CHLORIDE, SODIUM LACTATE, POTASSIUM CHLORIDE, AND CALCIUM CHLORIDE: 600; 310; 30; 20 INJECTION, SOLUTION INTRAVENOUS at 06:12

## 2022-12-20 RX ADMIN — DEXAMETHASONE SODIUM PHOSPHATE 4 MG: 4 INJECTION, SOLUTION INTRAMUSCULAR; INTRAVENOUS at 07:12

## 2022-12-20 RX ADMIN — LIDOCAINE HYDROCHLORIDE 80 MG: 20 INJECTION, SOLUTION INTRAVENOUS at 07:12

## 2022-12-20 RX ADMIN — ROCURONIUM BROMIDE 50 MG: 10 INJECTION, SOLUTION INTRAVENOUS at 07:12

## 2022-12-20 RX ADMIN — PROPOFOL 130 MG: 10 INJECTION, EMULSION INTRAVENOUS at 07:12

## 2022-12-20 NOTE — H&P
History & Physical     SUBJECTIVE:      History of Present Illness:  Patient is a 62 y.o. male presents with symptomatic umbilical hernia for repair.  Patient with diabetes mellitus and dyslipidemia, hypothyroidism, hypertension, sleep apnea        Review of patient's allergies indicates:  No Known Allergies            Current Outpatient Medications   Medication Sig Dispense Refill    levothyroxine (SYNTHROID) 200 MCG tablet TAKE 1 TABLET (200 MCG TOTAL) BY MOUTH BEFORE BREAKFAST. 90 tablet 4    lisinopriL 10 MG tablet TAKE 1 TABLET BY MOUTH EVERY DAY 90 tablet 2    rOPINIRole (REQUIP) 0.5 MG tablet TAKE 1 TO 2 TABLETS BY MOUTH DAILY IN THE EVENING 180 tablet 1    simvastatin (ZOCOR) 40 MG tablet TAKE 1 TABLET BY MOUTH EVERY DAY 90 tablet 2    tadalafiL (CIALIS) 5 MG tablet TAKE 1 TABLET BY MOUTH DAILY AS NEEDED FOR ERECTILE DYSFUNCTION. 30 tablet 0    aspirin (ECOTRIN) 81 MG EC tablet Take 1 tablet (81 mg total) by mouth once daily. 1 tablet 0    glucagon (BAQSIMI) 3 mg/actuation Spry Give one puff via nostril. Hold device between fingers and thumb, do not push plunger yet, insert tip gently into one nostril until finger(s) touch the outside of the nose, then push plunger firmly all the way in . Dose is complete when the green line disappears. 2 each 3    insulin lispro 100 unit/mL injection TO BE USED VIA INSULIN PUMP. MAX  UNITS DAILY. USE BEFORE MEALS AND SNACKS ON SLIDING SCALE 30 mL 8    mupirocin (BACTROBAN) 2 % ointment Apply to affected area 2 times daily 22 g 1      No current facility-administered medications for this visit.              Past Medical History:   Diagnosis Date    Diabetes mellitus      DM (diabetes mellitus), type 1, uncontrolled with complications 7/26/2016    Fatigue      Hypertension      Hypothyroidism      Sleep apnea      Type 1 diabetes mellitus with hyperglycemia 8/14/2018            Past Surgical History:   Procedure Laterality Date    COLONOSCOPY N/A 7/31/2017      Procedure: COLONOSCOPY;  Surgeon: Bacilio Thakkar MD;  Location: [facility] ([address] [place] [address]);  Service: Endoscopy;  Laterality: N/A;    KNEE ARTHROSCOPY W/ MENISCAL REPAIR                Family History   Problem Relation Age of Onset    Cancer Father      Colon cancer Neg Hx        Social History            Tobacco Use    Smoking status: Former       Types: Cigarettes       Quit date: 10/10/2008       Years since quittin.1    Smokeless tobacco: Never   Substance Use Topics    Alcohol use: Yes       Comment: ocassionally    Drug use: No         Review of Systems:  Review of Systems   Constitutional:  Negative for activity change, appetite change, chills, diaphoresis, fatigue, fever and unexpected weight change.   HENT:  Negative for congestion, dental problem, drooling, ear discharge, ear pain, facial swelling, hearing loss, mouth sores, nosebleeds, postnasal drip, rhinorrhea, sinus pressure, sinus pain, sneezing, sore throat, tinnitus, trouble swallowing and voice change.    Eyes:  Negative for photophobia, pain, discharge, redness, itching and visual disturbance.   Respiratory:  Negative for apnea, cough, choking, chest tightness, shortness of breath, wheezing and stridor.    Cardiovascular:  Negative for chest pain, palpitations and leg swelling.   Gastrointestinal:  Positive for abdominal pain. Negative for abdominal distention, anal bleeding, blood in stool, constipation, diarrhea, nausea, rectal pain and vomiting.   Endocrine: Negative for cold intolerance, heat intolerance, polydipsia, polyphagia and polyuria.   Genitourinary:  Negative for decreased urine volume, difficulty urinating, dysuria, enuresis, flank pain, frequency, genital sores, hematuria, penile discharge, penile pain, penile swelling, scrotal swelling, testicular pain and urgency.   Musculoskeletal:  Negative for arthralgias, back pain, gait problem, joint swelling, myalgias, neck pain and neck stiffness.   Skin:  Negative for color change, pallor, rash  "and wound.   Allergic/Immunologic: Negative for environmental allergies, food allergies and immunocompromised state.   Neurological:  Negative for dizziness, tremors, seizures, syncope, facial asymmetry, speech difficulty, weakness, light-headedness, numbness and headaches.   Hematological:  Negative for adenopathy. Does not bruise/bleed easily.   Psychiatric/Behavioral:  Negative for agitation, behavioral problems, confusion, decreased concentration, dysphoric mood, hallucinations, self-injury, sleep disturbance and suicidal ideas. The patient is not nervous/anxious and is not hyperactive.       OBJECTIVE:      Vital Signs (Most Recent)  Pulse: 61 (11/16/22 1319)  BP: (!) 153/73 (11/16/22 1319)  SpO2: 97 % (11/16/22 1319)  5' 8" (1.727 m)  87.1 kg (192 lb)      Physical Exam:  Physical Exam  Constitutional:       General: He is not in acute distress.     Appearance: Normal appearance. He is normal weight. He is not ill-appearing, toxic-appearing or diaphoretic.   HENT:      Head: Normocephalic and atraumatic.   Eyes:      General: No scleral icterus.        Right eye: No discharge.         Left eye: No discharge.      Extraocular Movements: Extraocular movements intact.      Conjunctiva/sclera: Conjunctivae normal.   Neck:      Vascular: No carotid bruit.   Cardiovascular:      Rate and Rhythm: Normal rate and regular rhythm.      Pulses: Normal pulses.      Heart sounds: Normal heart sounds. No murmur heard.    No friction rub. No gallop.   Pulmonary:      Effort: Pulmonary effort is normal. No respiratory distress.      Breath sounds: Normal breath sounds. No stridor. No wheezing, rhonchi or rales.   Chest:      Chest wall: No tenderness.   Abdominal:      General: Bowel sounds are normal. There is no distension.      Palpations: Abdomen is soft. There is no mass.      Tenderness: There is no abdominal tenderness. There is no guarding or rebound.      Hernia: A hernia is present.      Comments:   Reducible " umbilical hernia   Musculoskeletal:         General: No swelling, tenderness, deformity or signs of injury. Normal range of motion.      Cervical back: Normal range of motion and neck supple. No rigidity or tenderness.      Right lower leg: No edema.      Left lower leg: No edema.   Lymphadenopathy:      Cervical: No cervical adenopathy.   Skin:     General: Skin is warm and dry.      Coloration: Skin is not jaundiced or pale.      Findings: No bruising, erythema, lesion or rash.   Neurological:      General: No focal deficit present.      Mental Status: He is alert and oriented to person, place, and time.      Motor: No weakness.      Coordination: Coordination normal.      Gait: Gait normal.   Psychiatric:         Mood and Affect: Mood normal.         Behavior: Behavior normal.         Thought Content: Thought content normal.         Judgment: Judgment normal.            ASSESSMENT/PLAN:      Symptomatic umbilical hernia for repair

## 2022-12-20 NOTE — ANESTHESIA PROCEDURE NOTES
Intubation    Date/Time: 12/20/2022 7:04 AM  Performed by: Patricio Gibbs CRNA  Authorized by: Braxton Renee MD     Intubation:     Induction:  Intravenous    Intubated:  Postinduction    Mask Ventilation:  Easy mask    Attempts:  1    Attempted By:  Student    Method of Intubation:  Video laryngoscopy    Blade:  Fay 3    Laryngeal View Grade: Grade I - full view of cords      Difficult Airway Encountered?: No      Complications:  None    Airway Device:  Oral endotracheal tube    Airway Device Size:  7.5    Style/Cuff Inflation:  Cuffed (inflated to minimal occlusive pressure)    Tube secured:  22    Secured at:  The lips    Placement Verified By:  Capnometry    Complicating Factors:  None    Findings Post-Intubation:  BS equal bilateral and atraumatic/condition of teeth unchanged

## 2022-12-20 NOTE — OR NURSING
VSS on RA. Pt denies pain. Dressing and PIV C/D/I. Home insulin pump/glucose monitor attached, glucose WNL. Meets Phase I discharge criteria. Ready for transfer to Phase II. Family notified.

## 2022-12-20 NOTE — TRANSFER OF CARE
"Anesthesia Transfer of Care Note    Patient: Owen Radford    Procedure(s) Performed: Procedure(s) (LRB):  REPAIR, HERNIA, UMBILICAL, AGE 5 YEARS OR OLDER (N/A)    Patient location: PACU    Anesthesia Type: general    Transport from OR: Transported from OR on 6-10 L/min O2 by face mask with adequate spontaneous ventilation    Post pain: adequate analgesia    Post assessment: no apparent anesthetic complications and tolerated procedure well    Post vital signs: stable    Level of consciousness: awake and responds to stimulation    Nausea/Vomiting: no nausea/vomiting    Complications: none    Transfer of care protocol was followed      Last vitals:   Visit Vitals  /63   Pulse 67   Temp 36.9 °C (98.4 °F) (Oral)   Resp 18   Ht 5' 8" (1.727 m)   Wt 89.9 kg (198 lb 4.9 oz)   SpO2 95%   BMI 30.15 kg/m²     "

## 2022-12-20 NOTE — PLAN OF CARE
Owen Radford has met all discharge criteria from Phase II. Vital Signs are stable, ambulating  without difficulty.Pain is now under control and tolerable for the pt. Pain score 3 at this time.  Discharge instructions given, patient verbalized understanding. Discharged from facility via wheelchair in stable condition.

## 2022-12-20 NOTE — ANESTHESIA PROCEDURE NOTES
Peripheral Block    Patient location during procedure: pre-op   Block not for primary anesthetic.  Reason for block: at surgeon's request and post-op pain management   Post-op Pain Location: abdominal pain   Start time: 12/20/2022 6:37 AM  Timeout: 12/20/2022 6:36 AM   End time: 12/20/2022 6:44 AM    Staffing  Authorizing Provider: Fito Alcantara MD  Performing Provider: Fito Alcantara MD    Preanesthetic Checklist  Completed: patient identified, IV checked, site marked, risks and benefits discussed, surgical consent, monitors and equipment checked, pre-op evaluation and timeout performed  Peripheral Block  Patient position: supine  Prep: ChloraPrep  Patient monitoring: heart rate, cardiac monitor, continuous pulse ox, continuous capnometry and frequent blood pressure checks  Block type: rectus sheath  Laterality: bilateral  Injection technique: single shot  Needle  Needle type: Stimuplex   Needle gauge: 22 G  Needle length: 2 in  Needle localization: anatomical landmarks and ultrasound guidance   -ultrasound image captured on disc.  Assessment  Injection assessment: negative aspiration and negative parasthesia  Paresthesia pain: none  Heart rate change: no  Slow fractionated injection: yes  Pain Tolerance: comfortable throughout block and no complaints  Medications:    Medications: ropivacaine (NAROPIN) injection 0.5% - Perineural   30 mL - 12/20/2022 6:44:00 AM    Additional Notes  VSS.  Vitals monitored throughout procedure - see record.  Patient tolerated procedure well. 15mL Ropivocaine 0.5% bilaterally; medication clearly distributed in proper rectus plane; no complications

## 2022-12-20 NOTE — OP NOTE
[facility], [facility] ([place])  Operative Note    SUMMARY     Surgery Date: 12/20/2022     Surgeon(s) and Role:     * Lewis Ritter Jr., MD - Primary    Assisting Surgeon: None    Pre-op Diagnosis:  Umbilical hernia without obstruction and without gangrene [K42.9]    Post-op Diagnosis:  Post-Op Diagnosis Codes:     * Umbilical hernia without obstruction and without gangrene [K42.9]    Procedure(s) (LRB):  REPAIR, HERNIA, UMBILICAL, AGE 5 YEARS OR OLDER (N/A)    Anesthesia: General    Description of Procedure:  The patient was brought to the operating room and placed in supine position.  The abdomen prepped and draped in a sterile fashion.  Incision made around the lower portion of the umbilicus.  Using blunt sharp dissection the hernia sac was isolated.  The sac was opened and then inverted into the peritoneal cavity.  The wounds inspected.  The fascia then closed with interrupted 0 Ethibond sutures.  The subcutaneous tissue closed with interrupted 3-0 Vicryl and the skin with running 4-0 Monocryl.  The wounds sterilely dressed.  The patient tolerated the procedure well left the operating good condition.  At the end of procedure all sponge lap and instrument counts were correct.    Estimated Blood Loss:  Minimal         Specimens:   Specimen (24h ago, onward)      None                SUMMARY     Admit Date:     Discharge Date and Time:  12/20/2022 7:47 AM    Hospital Course (synopsis of major diagnoses, care, treatment, and services provided during the course of the hospital stay):  Benign     Final Diagnosis: Post-Op Diagnosis Codes:     * Umbilical hernia without obstruction and without gangrene [K42.9]    Disposition: Home or Self Care    Follow Up/Patient Instructions:     Medications:  Reconciled Home Medications:      Medication List        START taking these medications      oxyCODONE-acetaminophen 7.5-325 mg per tablet  Commonly known as: PERCOCET  Take 1 tablet by mouth every 4 (four) hours as needed for  Pain.            CONTINUE taking these medications      acetaminophen 325 MG tablet  Commonly known as: TYLENOL  Take 325 mg by mouth every 4 (four) hours as needed.     aspirin 81 MG EC tablet  Commonly known as: ECOTRIN  Take 1 tablet (81 mg total) by mouth once daily.     BAQSIMI 3 mg/actuation Spry  Generic drug: glucagon  Give one puff via nostril. Hold device between fingers and thumb, do not push plunger yet, insert tip gently into one nostril until finger(s) touch the outside of the nose, then push plunger firmly all the way in . Dose is complete when the green line disappears.     clonazePAM 1 MG tablet  Commonly known as: KlonoPIN  Take 0.5 mg by mouth every evening.     cyanocobalamin 1000 MCG tablet  Commonly known as: VITAMIN B-12  Take 1,000 mcg by mouth every evening.     insulin lispro 100 unit/mL injection  TO BE USED VIA INSULIN PUMP. MAX  UNITS DAILY. USE BEFORE MEALS AND SNACKS ON SLIDING SCALE     levothyroxine 200 MCG tablet  Commonly known as: SYNTHROID  TAKE 1 TABLET (200 MCG TOTAL) BY MOUTH BEFORE BREAKFAST.     lisinopriL 10 MG tablet  TAKE 1 TABLET BY MOUTH EVERY DAY     rOPINIRole 0.5 MG tablet  Commonly known as: REQUIP  TAKE 1 TO 2 TABLETS BY MOUTH DAILY IN THE EVENING     simvastatin 40 MG tablet  Commonly known as: ZOCOR  TAKE 1 TABLET BY MOUTH EVERY DAY     tadalafiL 5 MG tablet  Commonly known as: CIALIS  TAKE 1 TABLET BY MOUTH DAILY AS NEEDED FOR ERECTILE DYSFUNCTION.            Discharge Procedure Orders   Diet general     Call MD for:  temperature >100.4     Call MD for:  persistent nausea and vomiting     Call MD for:  severe uncontrolled pain     Call MD for:  difficulty breathing, headache or visual disturbances     Call MD for:  redness, tenderness, or signs of infection (pain, swelling, redness, odor or green/yellow discharge around incision site)     Lifting restrictions   Order Comments: 15 Lb     Wound care routine (specify)   Order Comments: Wound care routine:    Leave dressing intact  May shower      Follow-up Information       Lewis Ritter Jr, MD Follow up in 1 week(s).    Specialties: General Surgery, Vascular Surgery  Contact information:  [address] [address] [phone]

## 2022-12-20 NOTE — ANESTHESIA POSTPROCEDURE EVALUATION
Anesthesia Post Evaluation    Patient: Owen Radford    Procedure(s) Performed: Procedure(s) (LRB):  REPAIR, HERNIA, UMBILICAL, AGE 5 YEARS OR OLDER (N/A)    Final Anesthesia Type: general      Patient location during evaluation: PACU  Patient participation: Yes- Able to Participate  Level of consciousness: awake and alert  Post-procedure vital signs: reviewed and stable  Pain management: adequate  Airway patency: patent    PONV status at discharge: No PONV  Anesthetic complications: no      Cardiovascular status: blood pressure returned to baseline  Respiratory status: unassisted  Hydration status: euvolemic  Follow-up not needed.          Vitals Value Taken Time   /75 12/20/22 0831   Temp 36.7 °C (98 °F) 12/20/22 0831   Pulse 73 12/20/22 0831   Resp 18 12/20/22 0906   SpO2 94 % 12/20/22 0831         Event Time   Out of Recovery 08:28:00         Pain/Alejandra Score: Pain Rating Prior to Med Admin: 4 (12/20/2022  9:06 AM)  Alejandra Score: 10 (12/20/2022  8:31 AM)

## 2022-12-21 VITALS
TEMPERATURE: 98 F | OXYGEN SATURATION: 94 % | RESPIRATION RATE: 15 BRPM | HEART RATE: 68 BPM | SYSTOLIC BLOOD PRESSURE: 148 MMHG | HEIGHT: 68 IN | BODY MASS INDEX: 30.05 KG/M2 | WEIGHT: 198.31 LBS | DIASTOLIC BLOOD PRESSURE: 78 MMHG

## 2022-12-27 ENCOUNTER — CLINICAL SUPPORT (OUTPATIENT)
Dept: DIABETES | Facility: CLINIC | Age: 62
End: 2022-12-27
Payer: COMMERCIAL

## 2022-12-27 PROCEDURE — G0108 PR DIAB MANAGE TRN  PER INDIV: ICD-10-PCS | Mod: 95,,, | Performed by: DIETITIAN, REGISTERED

## 2022-12-27 PROCEDURE — G0108 DIAB MANAGE TRN  PER INDIV: HCPCS | Mod: 95,,, | Performed by: DIETITIAN, REGISTERED

## 2022-12-27 RX ORDER — LEVOTHYROXINE SODIUM 200 UG/1
200 TABLET ORAL
Qty: 90 TABLET | Refills: 3 | Status: SHIPPED | OUTPATIENT
Start: 2022-12-27 | End: 2024-01-17

## 2022-12-27 NOTE — PROGRESS NOTES
Diabetes Care Specialist Progress Note  Author: Angelita Alejandro RD, CDE  Date: 12/27/2022    Diabetes Care Specialist Virtual Visit Note   It was in the patient's best interest to receive diabetes self-management education and support services in this format to prevent the exposure to COVID-19.        The patient location is: home in [place]  The chief complaint leading to consultation is: Diabetes  Visit type: audiovisual  Total time spent with patient: 30 min   Each patient to whom he or she provides medical services by telemedicine is:  (1) informed of the relationship between the physician and patient and the respective role of any other health care provider with respect to management of the patient; and (2) notified that he or she may decline to receive medical services by telemedicine and may withdraw from such care at any time.     Program Intake  Reason for Diabetes Program Visit:: Intervention  Type of Intervention:: Individual  Individual: Education  Education: Pattern Management    Lab Results   Component Value Date    HGBA1C 6.3 (H) 08/10/2022     Patient was started on T-Slim/Dexcom on 12/14. He switched from [id number] pump. Overall, patient is doing well. Reports reviewed with patient - TIR 73%, High 19%, Very High 4%, Low 3%, Very Low 1%. Noted some of the lows occurred after patient gave correction bolus around the same time as the pump was giving an auto-correction bolus. He also admits to sometimes forgetting to bolus before the meal and so would bolus after the meal and some lows occurred as a result. He also reports sometimes forgetting to give a bolus altogether. Patient generally eats small amounts of CHO at any one time - 30g or less. Noted on some days patient only entered CHO one time, but again patient admits to sometimes forgetting to bolus.  Bolusing mostly through the pump - sometimes uses his phone. Reviewed the Control IQ adjustments as well as the Sleep and Exercise Modes (he reports  sometimes forgetting to stop the sleep mode when he wakes up). Encouraged to give bolus before meals and to check pump to see if auto-correction bolus is being delivered before giving a correction.  Patient is still getting used to the system. Will hold off on changes at this time.     Assessment Summary and Plan    Based on today's diabetes care assessment, the following areas of need were identified:      Social 10/4/2022   Support No   Access to Mass Media/Tech No   Cognitive/Behavioral Health No   Culture/Yazidi No   Communication No   Health Literacy No              Diabetes Self-Management Skills 10/4/2022   Diabetes Disease Process/Treatment Options No   Medication No          Today's interventions were provided through individual discussion, instruction, and written materials were provided.      Patient verbalized understanding of instruction and written materials.  Pt was able to return back demonstration of instructions today. Patient understood key points, needs reinforcement and further instruction.     Diabetes Self-Management Care Plan:    Today's Diabetes Self-Management Care Plan was developed with Owen's input. Owen has agreed to work toward the following goal(s) to improve his/her overall diabetes control.      Care Plan: Diabetes Management   Updates made since 11/27/2022 12:00 AM        Problem: Medications         Goal: Use Tandem pump as instructed    Start Date: 12/14/2022   Expected End Date: 3/14/2023   Priority: High   Barriers: No Barriers Identified          Task: Instructed patient on the Tandem Pump Completed 12/14/2022         Follow Up Plan     Follow up in about 4 weeks (around 1/24/2023).    Today's care plan and follow up schedule was discussed with patient.  Owen verbalized understanding of the care plan, goals, and agrees to follow up plan.        The patient was encouraged to communicate with his/her health care provider/physician and care team regarding his/her  condition(s) and treatment.  I provided the patient with my contact information today and encouraged to contact me via phone or [facility]'s Patient Portal as needed.     Length of Visit   Total Time: 30 Minutes       Bexarotene Pregnancy And Lactation Text: This medication is Pregnancy Category X and should not be given to women who are pregnant or may become pregnant. This medication should not be used if you are breast feeding.

## 2022-12-28 ENCOUNTER — OFFICE VISIT (OUTPATIENT)
Dept: SURGERY | Facility: CLINIC | Age: 62
End: 2022-12-28
Attending: SPECIALIST
Payer: COMMERCIAL

## 2022-12-28 VITALS — DIASTOLIC BLOOD PRESSURE: 65 MMHG | HEART RATE: 62 BPM | OXYGEN SATURATION: 97 % | SYSTOLIC BLOOD PRESSURE: 145 MMHG

## 2022-12-28 DIAGNOSIS — K42.9 UMBILICAL HERNIA WITHOUT OBSTRUCTION AND WITHOUT GANGRENE: Primary | ICD-10-CM

## 2022-12-28 PROCEDURE — 99999 PR PBB SHADOW E&M-EST. PATIENT-LVL III: CPT | Mod: PBBFAC,,, | Performed by: SPECIALIST

## 2022-12-28 PROCEDURE — 99999 PR PBB SHADOW E&M-EST. PATIENT-LVL III: ICD-10-PCS | Mod: PBBFAC,,, | Performed by: SPECIALIST

## 2022-12-28 PROCEDURE — 99024 POSTOP FOLLOW-UP VISIT: CPT | Mod: S$GLB,,, | Performed by: SPECIALIST

## 2022-12-28 PROCEDURE — 99024 PR POST-OP FOLLOW-UP VISIT: ICD-10-PCS | Mod: S$GLB,,, | Performed by: SPECIALIST

## 2022-12-28 NOTE — PROGRESS NOTES
Status post open primary repair of symptomatic umbilical hernia 12/20/2022     Subjective   No complaints    PE   Abdomen-soft, incisions healing without evidence of infection or recurrence     Impression/plan  RTC 6-8 weeks

## 2023-01-24 ENCOUNTER — CLINICAL SUPPORT (OUTPATIENT)
Dept: DIABETES | Facility: CLINIC | Age: 63
End: 2023-01-24
Payer: COMMERCIAL

## 2023-01-24 ENCOUNTER — PATIENT MESSAGE (OUTPATIENT)
Dept: DIABETES | Facility: CLINIC | Age: 63
End: 2023-01-24

## 2023-01-24 NOTE — PROGRESS NOTES
Diabetes Care Specialist Progress Note  Author: Angelita Alejandro RD, CDE  Date: 1/24/2023    Diabetes Care Specialist Telehealth Visit Note     It was in the patient's best interest to receive diabetes self-management education and support services in this format to prevent the exposure to COVID-19.        Established Patient - Audio Only Telehealth Visit  The patient location is: home   The chief complaint leading to consultation is: Diabetes    Visit type: Virtual visit with audio only (telephone)  Total time spent with patient: 15 min      The reason for the audio only service rather than synchronous audio and video virtual visit was related to technical difficulties or patient preference/necessity. Patient requested phone call. He was unable to connect to visit.     Each patient to whom I provide medical services by telemedicine is:  (1) informed of the relationship between the physician and patient and the respective role of any other health care provider with respect to management of the patient; and (2) notified that they may decline to receive medical services by telemedicine and may withdraw from such care at any time. Patient verbally consented to receive this service via voice-only telephone call.         Program Intake  Reason for Diabetes Program Visit:: Intervention  Type of Intervention:: Individual  Individual: Education  Education: Pattern Management    Lab Results   Component Value Date    HGBA1C 6.3 (H) 08/10/2022     Patient requested phone call - unable to connect to visit. Overall, continues to do well on the T-Slim and Dexcom. Dexcom and T-Slim reports attached - reviewed with patient. 74% TIR, 4% Very High, 19% High, 2% low, <1% Very Low. Reports had recent steroid injection which impacted his BGs. Reports readings are finally starting to look better. Admits he could do better with CHO counting. Sometimes using the sleep and exercise modes - often forgets to turn on or off. No questions or  concerns at this time. He will have labs and see ENDO NP next month. He will call or message with further questions or concerns.    Assessment Summary and Plan    Based on today's diabetes care assessment, the following areas of need were identified:      Social 10/4/2022   Support No   Access to Mass Media/Tech No   Cognitive/Behavioral Health No   Culture/Pentecostal No   Communication No   Health Literacy No              Diabetes Self-Management Skills 10/4/2022   Diabetes Disease Process/Treatment Options No   Medication No          Today's interventions were provided through individual discussion, instruction, and written materials were provided.      Patient verbalized understanding of instruction and written materials.  Pt was able to return back demonstration of instructions today. Patient understood key points, needs reinforcement and further instruction.     Diabetes Self-Management Care Plan:    Today's Diabetes Self-Management Care Plan was developed with Owen's input. Owen has agreed to work toward the following goal(s) to improve his/her overall diabetes control.      There are no recently modified care plans to display for this patient.      Follow Up Plan     Follow up in about 5 weeks (around 2/28/2023).    Today's care plan and follow up schedule was discussed with patient.  Owen verbalized understanding of the care plan, goals, and agrees to follow up plan.        The patient was encouraged to communicate with his/her health care provider/physician and care team regarding his/her condition(s) and treatment.  I provided the patient with my contact information today and encouraged to contact me via phone or [facility] Patient Portal as needed.     Length of Visit   Total Time: 15 Minutes

## 2023-01-25 DIAGNOSIS — E11.59 HYPERTENSION ASSOCIATED WITH DIABETES: ICD-10-CM

## 2023-01-25 DIAGNOSIS — I15.2 HYPERTENSION ASSOCIATED WITH DIABETES: ICD-10-CM

## 2023-01-25 RX ORDER — LISINOPRIL 10 MG/1
TABLET ORAL
Qty: 90 TABLET | Refills: 2 | Status: SHIPPED | OUTPATIENT
Start: 2023-01-25 | End: 2023-10-16

## 2023-02-22 ENCOUNTER — PATIENT MESSAGE (OUTPATIENT)
Dept: INTERNAL MEDICINE | Facility: CLINIC | Age: 63
End: 2023-02-22
Payer: COMMERCIAL

## 2023-02-22 ENCOUNTER — LAB VISIT (OUTPATIENT)
Dept: LAB | Facility: HOSPITAL | Age: 63
End: 2023-02-22
Attending: NURSE PRACTITIONER
Payer: COMMERCIAL

## 2023-02-22 DIAGNOSIS — E03.9 HYPOTHYROIDISM, UNSPECIFIED TYPE: ICD-10-CM

## 2023-02-22 DIAGNOSIS — E78.5 DYSLIPIDEMIA: ICD-10-CM

## 2023-02-22 DIAGNOSIS — E10.65 TYPE 1 DIABETES MELLITUS WITH HYPERGLYCEMIA: ICD-10-CM

## 2023-02-22 LAB
CHOLEST SERPL-MCNC: 163 MG/DL (ref 120–199)
CHOLEST/HDLC SERPL: 3.2 {RATIO} (ref 2–5)
ESTIMATED AVG GLUCOSE: 128 MG/DL (ref 68–131)
HBA1C MFR BLD: 6.1 % (ref 4–5.6)
HDLC SERPL-MCNC: 51 MG/DL (ref 40–75)
HDLC SERPL: 31.3 % (ref 20–50)
LDLC SERPL CALC-MCNC: 99.2 MG/DL (ref 63–159)
NONHDLC SERPL-MCNC: 112 MG/DL
TRIGL SERPL-MCNC: 64 MG/DL (ref 30–150)
TSH SERPL DL<=0.005 MIU/L-ACNC: 3.31 UIU/ML (ref 0.4–4)

## 2023-02-22 PROCEDURE — 84443 ASSAY THYROID STIM HORMONE: CPT | Performed by: NURSE PRACTITIONER

## 2023-02-22 PROCEDURE — 36415 COLL VENOUS BLD VENIPUNCTURE: CPT | Mod: PN | Performed by: NURSE PRACTITIONER

## 2023-02-22 PROCEDURE — 83036 HEMOGLOBIN GLYCOSYLATED A1C: CPT | Performed by: NURSE PRACTITIONER

## 2023-02-22 PROCEDURE — 80061 LIPID PANEL: CPT | Performed by: NURSE PRACTITIONER

## 2023-02-23 ENCOUNTER — PATIENT MESSAGE (OUTPATIENT)
Dept: INTERNAL MEDICINE | Facility: CLINIC | Age: 63
End: 2023-02-23
Payer: COMMERCIAL

## 2023-02-24 NOTE — TELEPHONE ENCOUNTER
Called [employer] to request the form to order Tandem supplies. Spoke to Martha and she confirmed the provider information then stated we should get the form within 24 - 48hrs.      ----- Message from MAGDA Bradley FNP sent at 2/23/2023  5:18 PM CST -----  Regarding: magdalena form-call  Please call magdalena in the morning for form     Pt needs tandem supplies    See below:  Can you call them at [phone]  Needs expedited.

## 2023-02-27 ENCOUNTER — PATIENT MESSAGE (OUTPATIENT)
Dept: DIABETES | Facility: CLINIC | Age: 63
End: 2023-02-27
Payer: COMMERCIAL

## 2023-02-28 ENCOUNTER — OFFICE VISIT (OUTPATIENT)
Dept: INTERNAL MEDICINE | Facility: CLINIC | Age: 63
End: 2023-02-28
Payer: COMMERCIAL

## 2023-02-28 ENCOUNTER — TELEPHONE (OUTPATIENT)
Dept: INTERNAL MEDICINE | Facility: CLINIC | Age: 63
End: 2023-02-28
Payer: COMMERCIAL

## 2023-02-28 DIAGNOSIS — I15.2 HYPERTENSION ASSOCIATED WITH DIABETES: ICD-10-CM

## 2023-02-28 DIAGNOSIS — E03.9 HYPOTHYROIDISM, UNSPECIFIED TYPE: ICD-10-CM

## 2023-02-28 DIAGNOSIS — E10.65 TYPE 1 DIABETES MELLITUS WITH HYPERGLYCEMIA: Primary | ICD-10-CM

## 2023-02-28 DIAGNOSIS — H35.00 RETINOPATHY: ICD-10-CM

## 2023-02-28 DIAGNOSIS — G47.33 OBSTRUCTIVE SLEEP APNEA: ICD-10-CM

## 2023-02-28 DIAGNOSIS — E78.5 DYSLIPIDEMIA: ICD-10-CM

## 2023-02-28 DIAGNOSIS — E11.59 HYPERTENSION ASSOCIATED WITH DIABETES: ICD-10-CM

## 2023-02-28 DIAGNOSIS — Z46.81 INSULIN PUMP TITRATION: ICD-10-CM

## 2023-02-28 PROCEDURE — 99214 OFFICE O/P EST MOD 30 MIN: CPT | Mod: 95,,, | Performed by: NURSE PRACTITIONER

## 2023-02-28 PROCEDURE — 99214 PR OFFICE/OUTPT VISIT, EST, LEVL IV, 30-39 MIN: ICD-10-PCS | Mod: 95,,, | Performed by: NURSE PRACTITIONER

## 2023-02-28 RX ORDER — TRAZODONE HYDROCHLORIDE 50 MG/1
50 TABLET ORAL NIGHTLY
Qty: 30 TABLET | Refills: 4 | Status: SHIPPED | OUTPATIENT
Start: 2023-02-28 | End: 2023-05-26

## 2023-02-28 NOTE — TELEPHONE ENCOUNTER
----- Message from MAGDA Bradley, YOUSIF sent at 2/28/2023  4:33 PM CST -----  Regarding: form needed  Can you have someone call [facility] and place the order for me. Number is [phone]  thanks

## 2023-02-28 NOTE — PROGRESS NOTES
CC: Patient is here for management of Type 1 diabetes and review of medical conditions as listed in visit diagnosis.      HPI: Mr. Owen Radford is a 62 y.o. White male who was diagnosed with SUSAN/Type 1  DM x >25 years ago, seen by Dr. Sheffield in 2016.    Last seen by me in summer 2022  Since then pt switched from medtronic to tandem control iq  A1c improved even further  Has c/o requip rx, needs sleep aid, requip is too strong.  [employer]ush shipment needed for tandem and dexcom supplies via [employer] is needed.  Office is awaiting form/cmn.   The patient location is: home  The chief complaint leading to consultation is: type 1 dm    Visit type: audiovisual    Face to Face time with patient: 15,22   minutes of total time spent on the encounter, which includes face to face time and non-face to face time preparing to see the patient (eg, review of tests), Obtaining and/or reviewing separately obtained history, Documenting clinical information in the electronic or other health record, Independently interpreting results (not separately reported) and communicating results to the patient/family/caregiver, or Care coordination (not separately reported).         Each patient to whom he or she provides medical services by telemedicine is:  (1) informed of the relationship between the physician and patient and the respective role of any other health care provider with respect to management of the patient; and (2) notified that he or she may decline to receive medical services by telemedicine and may withdraw from such care at any time.    Notes:     Target  mg/dl   iob 2:30 hours  ICR 1:12  isf 40     mn 0.95 u/hr  0600 1.95 u/hr  12n 1.95 u/hr  430p 1.8 u/hr    Avg in the past tdd : 45 units    On pump, injections 4 x a day  Testing 4 times a day  Type 1 demonstrated an understanding of technology and motivated to use the device correctly. Patients are expected to adhere to a diabetes treatment plan and are capable of recognizing  alerts and alarms.    Patient has  impaired awareness of hypoglycemia and puts patient at risk     Lab Results   Component Value Date    HGBA1C 6.1 (H) 02/22/2023     Has h/o severe hypoglycemia, 40 -50 mg/dl  H/o nocturnal hypoglycemia  Has hypoglycemia unawareness     Has h/o hypothyroidism, last tsh, t4 free  Lab Results   Component Value Date    TSH 3.310 02/22/2023   on lt4 200 mcg daily -not always taking without other medications  Holding off lt4 25 mcg additional    CURRENT DIABETIC MEDS: novolog w/ insulin pump   Control iq iob 5 hours, target 110  See above    Diabetes Management Status    Statin: Taking  ACE/ARB: Taking    Screening or Prevention Patient's value Goal Complete/Controlled?   HgA1C Testing and Control   Lab Results   Component Value Date    HGBA1C 6.1 (H) 02/22/2023      Annually/Less than 8% Yes   Lipid profile : 02/22/2023 Annually Yes   LDL control Lab Results   Component Value Date    LDLCALC 99.2 02/22/2023    Annually/Less than 100 mg/dl  Yes   Nephropathy screening Lab Results   Component Value Date    LABMICR 10.0 02/02/2022     No results found for: PROTEINUA Annually Yes   Blood pressure BP Readings from Last 1 Encounters:   12/28/22 (!) 145/65    Less than 140/90 Yes   Dilated retinal exam : 06/01/2022 Annually No   Foot exam   : 08/10/2022 Annually Yes   DR-annually April   [facility]     REVIEW OF SYSTEMS  General: no weakness, fatigue, +weight fluctuations  Eyes: no double or blurred vision, eye pain, or redness   Cardiovascular: no chest pain, palpitations, edema, or murmurs.   Respiratory: no cough or dyspnea.   GI: no heartburn, nausea, or changes in bowel patterns; good appetite.   Skin: no rashes, dryness, itching, or reactions at insulin injection sites.   Neuro: no numbness, tingling, tremors, or vertigo. +restless leg syndrome  Endocrine: no polyuria, polydipsia, polyphagia, heat or cold intolerance.   Sleep cycle is off, uses cpap    Vital  Signs  There were no vitals taken for this visit.    Hemoglobin A1C   Date Value Ref Range Status   02/22/2023 6.1 (H) 4.0 - 5.6 % Final     Comment:     ADA Screening Guidelines:  5.7-6.4%  Consistent with prediabetes  >or=6.5%  Consistent with diabetes    High levels of fetal hemoglobin interfere with the HbA1C  assay. Heterozygous hemoglobin variants (HbS, HgC, etc)do  not significantly interfere with this assay.   However, presence of multiple variants may affect accuracy.     08/10/2022 6.3 (H) 4.0 - 5.6 % Final     Comment:     ADA Screening Guidelines:  5.7-6.4%  Consistent with prediabetes  >or=6.5%  Consistent with diabetes    High levels of fetal hemoglobin interfere with the HbA1C  assay. Heterozygous hemoglobin variants (HbS, HgC, etc)do  not significantly interfere with this assay.   However, presence of multiple variants may affect accuracy.     02/02/2022 6.6 (H) 4.0 - 5.6 % Final     Comment:     ADA Screening Guidelines:  5.7-6.4%  Consistent with prediabetes  >or=6.5%  Consistent with diabetes    High levels of fetal hemoglobin interfere with the HbA1C  assay. Heterozygous hemoglobin variants (HbS, HgC, etc)do  not significantly interfere with this assay.   However, presence of multiple variants may affect accuracy.        Lab Results   Component Value Date    CREATININE 0.9 11/29/2022      Lab Results   Component Value Date    TSH 3.310 02/22/2023      Lab Results   Component Value Date    LDLCALC 99.2 02/22/2023        PHYSICAL EXAMINATION  Constitutional: Appears well, no distress        Assessment/Plan  1. Type 1 diabetes mellitus with hyperglycemia  Hemoglobin A1C    Microalbumin/Creatinine Ratio, Urine      2. Hypothyroidism, unspecified type        3. Hypertension associated with diabetes        4. Dyslipidemia        5. Retinopathy        6. Obstructive sleep apnea        7. Insulin pump titration        1-7. Has baqsimi  Continue settings above  Below goal   Loves the tandem control iq    Has refills  Hold sample dexcom   Send chart note to [facility] for expedited shipment of cgm, tandem supplies   Send trazadone  Stop requip  F/u in 5 months  A1c urine mac prior   Back up lantus or levemir 40-44 units at night     FOLLOW UP  In 5 -6 months      Orders Placed This Encounter   Procedures    Hemoglobin A1C     Standing Status:   Future     Standing Expiration Date:   4/28/2024    Microalbumin/Creatinine Ratio, Urine     Standing Status:   Future     Standing Expiration Date:   4/28/2024     Order Specific Question:   Specimen Source     Answer:   Urine

## 2023-02-28 NOTE — TELEPHONE ENCOUNTER
Salena spoke to the [facility] staff.    ----- Message from MAGDA Bradley FNP sent at 2/28/2023  4:33 PM CST -----  Regarding: form needed  Can you have someone call [facility] and place the order for me. Number is [phone]  thanks

## 2023-03-01 ENCOUNTER — TELEPHONE (OUTPATIENT)
Dept: INTERNAL MEDICINE | Facility: CLINIC | Age: 63
End: 2023-03-01
Payer: COMMERCIAL

## 2023-03-01 PROBLEM — Z46.81 INSULIN PUMP TITRATION: Status: ACTIVE | Noted: 2023-03-01

## 2023-03-01 NOTE — TELEPHONE ENCOUNTER
----- Message from MAGDA Bradley, FNP sent at 3/1/2023  7:03 AM CST -----  Regarding: appt Heron 5 months, labs 1 week prior  F/u in 5 months  A1c urine mac prior   Thanks  IB

## 2023-04-24 ENCOUNTER — PATIENT MESSAGE (OUTPATIENT)
Dept: INTERNAL MEDICINE | Facility: CLINIC | Age: 63
End: 2023-04-24
Payer: COMMERCIAL

## 2023-05-26 RX ORDER — TRAZODONE HYDROCHLORIDE 50 MG/1
TABLET ORAL
Qty: 90 TABLET | Refills: 1 | Status: SHIPPED | OUTPATIENT
Start: 2023-05-26 | End: 2023-12-11

## 2023-06-15 ENCOUNTER — PATIENT MESSAGE (OUTPATIENT)
Dept: INTERNAL MEDICINE | Facility: CLINIC | Age: 63
End: 2023-06-15
Payer: COMMERCIAL

## 2023-06-15 RX ORDER — GABAPENTIN 300 MG/1
300 CAPSULE ORAL 3 TIMES DAILY
Qty: 90 CAPSULE | Refills: 11 | Status: SHIPPED | OUTPATIENT
Start: 2023-06-15 | End: 2024-06-14

## 2023-07-19 RX ORDER — INSULIN LISPRO 100 [IU]/ML
INJECTION, SOLUTION INTRAVENOUS; SUBCUTANEOUS
Qty: 30 ML | Refills: 8 | Status: SHIPPED | OUTPATIENT
Start: 2023-07-19

## 2023-07-20 ENCOUNTER — PATIENT MESSAGE (OUTPATIENT)
Dept: INTERNAL MEDICINE | Facility: CLINIC | Age: 63
End: 2023-07-20
Payer: COMMERCIAL

## 2023-07-21 RX ORDER — BLOOD-GLUCOSE TRANSMITTER
EACH MISCELLANEOUS
Qty: 1 EACH | Refills: 1 | Status: SHIPPED | OUTPATIENT
Start: 2023-07-21 | End: 2024-02-29 | Stop reason: SDUPTHER

## 2023-07-21 RX ORDER — BLOOD-GLUCOSE SENSOR
EACH MISCELLANEOUS
Qty: 9 EACH | Refills: 3 | Status: SHIPPED | OUTPATIENT
Start: 2023-07-21

## 2023-07-27 ENCOUNTER — LAB VISIT (OUTPATIENT)
Dept: LAB | Facility: HOSPITAL | Age: 63
End: 2023-07-27
Payer: COMMERCIAL

## 2023-07-27 DIAGNOSIS — E10.65 TYPE 1 DIABETES MELLITUS WITH HYPERGLYCEMIA: ICD-10-CM

## 2023-07-27 LAB
ALBUMIN/CREAT UR: NORMAL UG/MG (ref 0–30)
CREAT UR-MCNC: 39 MG/DL (ref 23–375)
MICROALBUMIN UR DL<=1MG/L-MCNC: <5 UG/ML

## 2023-07-27 PROCEDURE — 82570 ASSAY OF URINE CREATININE: CPT | Performed by: NURSE PRACTITIONER

## 2023-08-10 ENCOUNTER — PATIENT MESSAGE (OUTPATIENT)
Dept: INTERNAL MEDICINE | Facility: CLINIC | Age: 63
End: 2023-08-10
Payer: COMMERCIAL

## 2023-08-14 ENCOUNTER — PATIENT MESSAGE (OUTPATIENT)
Dept: DIABETES | Facility: CLINIC | Age: 63
End: 2023-08-14
Payer: COMMERCIAL

## 2023-10-15 DIAGNOSIS — E11.59 HYPERTENSION ASSOCIATED WITH DIABETES: ICD-10-CM

## 2023-10-15 DIAGNOSIS — I15.2 HYPERTENSION ASSOCIATED WITH DIABETES: ICD-10-CM

## 2023-10-16 RX ORDER — LISINOPRIL 10 MG/1
TABLET ORAL
Qty: 90 TABLET | Refills: 2 | Status: SHIPPED | OUTPATIENT
Start: 2023-10-16

## 2023-10-16 RX ORDER — SIMVASTATIN 40 MG/1
TABLET, FILM COATED ORAL
Qty: 90 TABLET | Refills: 2 | Status: SHIPPED | OUTPATIENT
Start: 2023-10-16

## 2023-11-24 ENCOUNTER — TELEPHONE (OUTPATIENT)
Dept: INTERNAL MEDICINE | Facility: CLINIC | Age: 63
End: 2023-11-24
Payer: COMMERCIAL

## 2023-11-24 NOTE — TELEPHONE ENCOUNTER
----- Message from MAGDA Bradley, YOUSIF sent at 11/22/2023  4:58 PM CST -----  Contact: [phone]  This was just sent   ----- Message -----  From: Susanna Andersen  Sent: 11/22/2023   4:42 PM CST  To: [facility] Staff    [email or web address]     Patient is calling for Medical Advice regarding:    How long has patient had these symptoms:    Pharmacy name and phone#:    Would like response via [employer]hart: call back    Comments: Mikelma is calling from [employer] regarding the supplies order form that was faxed over on 11/20. Fax# [phone]

## 2023-11-27 ENCOUNTER — TELEPHONE (OUTPATIENT)
Dept: INTERNAL MEDICINE | Facility: CLINIC | Age: 63
End: 2023-11-27
Payer: COMMERCIAL

## 2023-11-27 NOTE — TELEPHONE ENCOUNTER
----- Message from Susanna Andersen sent at 11/22/2023  4:38 PM CST -----  Contact: [email or web address] [phone]  [email or web address]     Patient is calling for Medical Advice regarding:    How long has patient had these symptoms:    Pharmacy name and phone#:    Would like response via Storyfulhart: call back    Comments: Mikelma is calling from [employer] regarding the supplies order form that was faxed over on 11/20. Fax# [phone]

## 2023-11-27 NOTE — TELEPHONE ENCOUNTER
Called [facility]. Was transferred two times and sat on hold 17 mins. Re faxed paperwork faxed on 11/13/23 and 11/22/23

## 2023-11-28 ENCOUNTER — TELEPHONE (OUTPATIENT)
Dept: INTERNAL MEDICINE | Facility: CLINIC | Age: 63
End: 2023-11-28
Payer: COMMERCIAL

## 2023-11-28 DIAGNOSIS — E10.65 TYPE 1 DIABETES MELLITUS WITH HYPERGLYCEMIA: Primary | ICD-10-CM

## 2023-12-06 ENCOUNTER — OFFICE VISIT (OUTPATIENT)
Dept: URGENT CARE | Facility: CLINIC | Age: 63
End: 2023-12-06
Payer: COMMERCIAL

## 2023-12-06 ENCOUNTER — PATIENT MESSAGE (OUTPATIENT)
Dept: INTERNAL MEDICINE | Facility: CLINIC | Age: 63
End: 2023-12-06
Payer: COMMERCIAL

## 2023-12-06 VITALS
HEIGHT: 70 IN | HEART RATE: 64 BPM | TEMPERATURE: 98 F | WEIGHT: 190 LBS | OXYGEN SATURATION: 98 % | BODY MASS INDEX: 27.2 KG/M2 | DIASTOLIC BLOOD PRESSURE: 77 MMHG | RESPIRATION RATE: 18 BRPM | SYSTOLIC BLOOD PRESSURE: 149 MMHG

## 2023-12-06 DIAGNOSIS — R05.9 COUGH, UNSPECIFIED TYPE: ICD-10-CM

## 2023-12-06 DIAGNOSIS — J10.1 INFLUENZA A: Primary | ICD-10-CM

## 2023-12-06 LAB
CTP QC/QA: YES
CTP QC/QA: YES
POC MOLECULAR INFLUENZA A AGN: POSITIVE
POC MOLECULAR INFLUENZA B AGN: NEGATIVE
SARS-COV-2 AG RESP QL IA.RAPID: NEGATIVE

## 2023-12-06 PROCEDURE — 87811 SARS CORONAVIRUS 2 ANTIGEN POCT, MANUAL READ: ICD-10-PCS | Mod: QW,S$GLB,, | Performed by: NURSE PRACTITIONER

## 2023-12-06 PROCEDURE — 99213 PR OFFICE/OUTPT VISIT, EST, LEVL III, 20-29 MIN: ICD-10-PCS | Mod: S$GLB,,, | Performed by: NURSE PRACTITIONER

## 2023-12-06 PROCEDURE — 87811 SARS-COV-2 COVID19 W/OPTIC: CPT | Mod: QW,S$GLB,, | Performed by: NURSE PRACTITIONER

## 2023-12-06 PROCEDURE — 87502 POCT INFLUENZA A/B MOLECULAR: ICD-10-PCS | Mod: QW,,, | Performed by: NURSE PRACTITIONER

## 2023-12-06 PROCEDURE — 99213 OFFICE O/P EST LOW 20 MIN: CPT | Mod: S$GLB,,, | Performed by: NURSE PRACTITIONER

## 2023-12-06 PROCEDURE — 87502 INFLUENZA DNA AMP PROBE: CPT | Mod: QW,,, | Performed by: NURSE PRACTITIONER

## 2023-12-06 RX ORDER — PROMETHAZINE HYDROCHLORIDE AND DEXTROMETHORPHAN HYDROBROMIDE 6.25; 15 MG/5ML; MG/5ML
5 SYRUP ORAL EVERY 6 HOURS PRN
Qty: 120 ML | Refills: 0 | Status: SHIPPED | OUTPATIENT
Start: 2023-12-06 | End: 2023-12-16

## 2023-12-06 RX ORDER — OSELTAMIVIR PHOSPHATE 75 MG/1
75 CAPSULE ORAL 2 TIMES DAILY
Qty: 10 CAPSULE | Refills: 0 | Status: SHIPPED | OUTPATIENT
Start: 2023-12-06 | End: 2023-12-11

## 2023-12-06 NOTE — PROGRESS NOTES
"Subjective:       Patient ID: Owen Radford is a 63 y.o. male.    Vitals:  height is 5' 10" (1.778 m) and weight is 86.2 kg (190 lb). His oral temperature is 98.1 °F (36.7 °C). His blood pressure is 149/77 (abnormal) and his pulse is 64. His respiration is 18 and oxygen saturation is 98%.     Chief Complaint: Cough (Started yesterday with a cough, head congestion and a headache.  Declines testing.)    This is a 63 y.o. male who presents today with a chief complaint of Started yesterday with a cough, head congestion , headache, and overall not feeling well.    Patient presents with:  Cough: Started yesterday with a cough, head congestion and a headache.           Cough  This is a new problem. The current episode started in the past 7 days. The problem has been gradually worsening. Associated symptoms include headaches and nasal congestion. Pertinent negatives include no shortness of breath or wheezing. Treatments tried: aspirin, nyquil and cough drops. The treatment provided moderate relief.       Constitution: Negative.   HENT:  Positive for congestion.    Respiratory:  Positive for cough. Negative for shortness of breath and wheezing.    Neurological:  Positive for headaches.           Objective:      Physical Exam   Constitutional: He is oriented to person, place, and time. He appears well-developed. He is cooperative.  Non-toxic appearance. He does not appear ill. No distress.   HENT:   Head: Normocephalic and atraumatic.   Ears:   Right Ear: Hearing, tympanic membrane, external ear and ear canal normal.   Left Ear: Hearing, tympanic membrane, external ear and ear canal normal.   Nose: Nose normal. No mucosal edema, rhinorrhea or nasal deformity. No epistaxis. Right sinus exhibits no maxillary sinus tenderness and no frontal sinus tenderness. Left sinus exhibits no maxillary sinus tenderness and no frontal sinus tenderness.   Mouth/Throat: Uvula is midline, oropharynx is clear and moist and mucous membranes are " normal. No trismus in the jaw. Normal dentition. No uvula swelling. No oropharyngeal exudate, posterior oropharyngeal edema or posterior oropharyngeal erythema.   Eyes: Conjunctivae and lids are normal. No scleral icterus.   Neck: Trachea normal and phonation normal. Neck supple. No edema present. No erythema present. No neck rigidity present.   Cardiovascular: Normal rate, regular rhythm, normal heart sounds and normal pulses.   Pulmonary/Chest: Effort normal and breath sounds normal. No respiratory distress. He has no decreased breath sounds. He has no rhonchi.   Abdominal: Normal appearance.   Musculoskeletal: Normal range of motion.         General: No deformity. Normal range of motion.   Neurological: He is alert and oriented to person, place, and time. He exhibits normal muscle tone. Coordination normal.   Skin: Skin is warm, dry, intact, not diaphoretic and not pale.   Psychiatric: His speech is normal and behavior is normal. Judgment and thought content normal.   Nursing note and vitals reviewed.        Past medical history and current medications reviewed.     Results for orders placed or performed in visit on 12/06/23   SARS Coronavirus 2 Antigen, POCT Manual Read   Result Value Ref Range    SARS Coronavirus 2 Antigen Negative Negative     Acceptable Yes    POCT Influenza A/B MOLECULAR   Result Value Ref Range    POC Molecular Influenza A Ag Positive (A) Negative, Not Reported    POC Molecular Influenza B Ag Negative Negative, Not Reported     Acceptable Yes       Assessment:           1. Influenza A    2. Cough, unspecified type              Plan:         Influenza A  -     oseltamivir (TAMIFLU) 75 MG capsule; Take 1 capsule (75 mg total) by mouth 2 (two) times daily. for 5 days  Dispense: 10 capsule; Refill: 0    Cough, unspecified type  -     SARS Coronavirus 2 Antigen, POCT Manual Read  -     POCT Influenza A/B MOLECULAR  -     promethazine-dextromethorphan  (PROMETHAZINE-DM) 6.25-15 mg/5 mL Syrp; Take 5 mLs by mouth every 6 (six) hours as needed (cough).  Dispense: 120 mL; Refill: 0             Patient Instructions   Please return here or go to the Emergency Department for any concerns or worsening of condition.  Please drink plenty of fluids.  Please get plenty of rest.  If you were prescribed antibiotics, please take them to completion.  If you were given wait & see antibiotics, please wait 5-7 days before taking them, and only take them if your symptoms have worsened or not improved.  If you do begin taking the antibiotics, please take them to completion.  If you were given a steroid shot in the clinic and have also been given a prescription for a steroid such as Prednisone or a Medrol Dose Pack, please begin taking them tomorrow.  If you do not have Hypertension or any history of palpitations, it is ok to take over the counter Sudafed or Mucinex D or Allegra-D or Claritin-D or Zyrtec-D.  If you do take one of the above, it is ok to combine that with plain over the counter Mucinex or Allegra or Claritin or Zyrtec.  If for example you are taking Zyrtec -D, you can combine that with Mucinex, but not Mucinex-D.  If you are taking Mucinex-D, you can combine that with plain Allegra or Claritin or Zyrtec.   If you do have Hypertension or palpitations, it is safe to take Coricidin HBP for relief of sinus symptoms.  If not allergic, please take over the counter Tylenol (Acetaminophen) and/or Motrin (Ibuprofen) as directed for control of pain and/or fever.  Please follow up with your primary care doctor or specialist as needed.    If you  smoke, please stop smoking.             DAVIN Rosas

## 2023-12-11 RX ORDER — TRAZODONE HYDROCHLORIDE 50 MG/1
50 TABLET ORAL NIGHTLY
Qty: 90 TABLET | Refills: 1 | Status: SHIPPED | OUTPATIENT
Start: 2023-12-11

## 2023-12-15 ENCOUNTER — LAB VISIT (OUTPATIENT)
Dept: LAB | Facility: HOSPITAL | Age: 63
End: 2023-12-15
Payer: COMMERCIAL

## 2023-12-15 DIAGNOSIS — E10.65 TYPE 1 DIABETES MELLITUS WITH HYPERGLYCEMIA: ICD-10-CM

## 2023-12-15 LAB
ANION GAP SERPL CALC-SCNC: 8 MMOL/L (ref 8–16)
BUN SERPL-MCNC: 12 MG/DL (ref 8–23)
CALCIUM SERPL-MCNC: 8.7 MG/DL (ref 8.7–10.5)
CHLORIDE SERPL-SCNC: 103 MMOL/L (ref 95–110)
CO2 SERPL-SCNC: 27 MMOL/L (ref 23–29)
CREAT SERPL-MCNC: 1 MG/DL (ref 0.5–1.4)
EST. GFR  (NO RACE VARIABLE): >60 ML/MIN/1.73 M^2
ESTIMATED AVG GLUCOSE: 174 MG/DL (ref 68–131)
GLUCOSE SERPL-MCNC: 277 MG/DL (ref 70–110)
HBA1C MFR BLD: 7.7 % (ref 4–5.6)
POTASSIUM SERPL-SCNC: 4.9 MMOL/L (ref 3.5–5.1)
SODIUM SERPL-SCNC: 138 MMOL/L (ref 136–145)

## 2023-12-15 PROCEDURE — 83036 HEMOGLOBIN GLYCOSYLATED A1C: CPT | Performed by: NURSE PRACTITIONER

## 2023-12-15 PROCEDURE — 36415 COLL VENOUS BLD VENIPUNCTURE: CPT | Mod: PN | Performed by: NURSE PRACTITIONER

## 2023-12-15 PROCEDURE — 80048 BASIC METABOLIC PNL TOTAL CA: CPT | Performed by: NURSE PRACTITIONER

## 2024-01-03 ENCOUNTER — PATIENT MESSAGE (OUTPATIENT)
Dept: INTERNAL MEDICINE | Facility: CLINIC | Age: 64
End: 2024-01-03
Payer: COMMERCIAL
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
## 2024-01-12 ENCOUNTER — TELEPHONE (OUTPATIENT)
Dept: INTERNAL MEDICINE | Facility: CLINIC | Age: 64
End: 2024-01-12
Payer: COMMERCIAL

## 2024-01-15 RX ORDER — ROPINIROLE 1 MG/1
1 TABLET, FILM COATED ORAL 2 TIMES DAILY
Qty: 60 TABLET | Refills: 6 | Status: SHIPPED | OUTPATIENT
Start: 2024-01-15 | End: 2025-01-14

## 2024-01-17 RX ORDER — LEVOTHYROXINE SODIUM 200 UG/1
200 TABLET ORAL
Qty: 90 TABLET | Refills: 3 | Status: SHIPPED | OUTPATIENT
Start: 2024-01-17

## 2024-02-29 ENCOUNTER — PATIENT MESSAGE (OUTPATIENT)
Dept: INTERNAL MEDICINE | Facility: CLINIC | Age: 64
End: 2024-02-29
Payer: COMMERCIAL

## 2024-02-29 RX ORDER — BLOOD-GLUCOSE TRANSMITTER
EACH MISCELLANEOUS
Qty: 1 EACH | Refills: 3 | Status: SHIPPED | OUTPATIENT
Start: 2024-02-29

## 2024-04-25 NOTE — PATIENT INSTRUCTIONS
PLEASE READ YOUR DISCHARGE INSTRUCTIONS ENTIRELY AS IT CONTAINS IMPORTANT INFORMATION.      Please drink plenty of fluids.    Please get plenty of rest.    Please return here or go to the Emergency Department for any concerns or worsening of condition.    Please take an over the counter antihistamine medication (allegra/Claritin/Zyrtec) of your choice as directed.    Mucinex    If not allergic, please take over the counter Tylenol (Acetaminophen) and/or Motrin (Ibuprofen) as directed for control of pain and/or fever.  Please follow up with your primary care doctor or specialist as needed.    Sore throat recommendations: Warm fluids, warm salt water gargles, throat lozenges, tea, honey, soup, rest, hydration.    Use over the counter flonase: one spray each nostril twice daily OR two sprays each nostril once daily.     Sinus rinses DO NOT USE TAP WATER, if you must, water must be a rolling boil for 1 minute, let it cool, then use.  May use distilled water, or over the counter nasal saline rinses.  Vics vapor rub in shower to help open nasal passages.  May use nasal gel to keep passages moisturized.  May use Nasal saline sprays during the day for added relief of congestion.   For those who go to the gym, please do not use the sauna or steam room now to clear sinuses.    If you  smoke, please stop smoking.      Please return or see your primary care doctor if you develop new or worsening symptoms.     Please arrange follow up with your primary medical clinic as soon as possible. You must understand that you've received an Urgent Care treatment only and that you may be released before all of your medical problems are known or treated. You, the patient, will arrange for follow up as instructed. If your symptoms worsen or fail to improve you should go to the Emergency Room.    Viral Upper Respiratory Illness (Adult)  You have a viral upper respiratory illness (URI), which is another term for the common cold. This illness  Routine Visit Note:    Skill/education provided: Wound care/CP assessment    Patient/caregiver response: pt tolerated well.     Plan for next visit: Wound care    Other pertinent info: Wound care clinic 4.26 Cardio 4.29 and Gastro 4.30 is contagious during the first few days. It is spread through the air by coughing and sneezing. It may also be spread by direct contact (touching the sick person and then touching your own eyes, nose, or mouth). Frequent handwashing will decrease risk of spread. Most viral illnesses go away within 7 to 10 days with rest and simple home remedies. Sometimes the illness may last for several weeks. Antibiotics will not kill a virus, and they are generally not prescribed for this condition.    Home care  · If symptoms are severe, rest at home for the first 2 to 3 days. When you resume activity, don't let yourself get too tired.  · Avoid being exposed to cigarette smoke (yours or others).  · You may use acetaminophen or ibuprofen to control pain and fever, unless another medicine was prescribed. (Note: If you have chronic liver or kidney disease, have ever had a stomach ulcer or gastrointestinal bleeding, or are taking blood-thinning medicines, talk with your healthcare provider before using these medicines.) Aspirin should never be given to anyone under 18 years of age who is ill with a viral infection or fever. It may cause severe liver or brain damage.  · Your appetite may be poor, so a light diet is fine. Avoid dehydration by drinking 6 to 8 glasses of fluids per day (water, soft drinks, juices, tea, or soup). Extra fluids will help loosen secretions in the nose and lungs.  · Over-the-counter cold medicines will not shorten the length of time youre sick, but they may be helpful for the following symptoms: cough, sore throat, and nasal and sinus congestion. (Note: Do not use decongestants if you have high blood pressure.)  Follow-up care  Follow up with your healthcare provider, or as advised.  When to seek medical advice  Call your healthcare provider right away if any of these occur:  · Cough with lots of colored sputum (mucus)  · Severe headache; face, neck, or ear pain  · Difficulty swallowing due to throat  pain  · Fever of 100.4°F (38°C)  Call 911, or get immediate medical care  Call emergency services right away if any of these occur:  · Chest pain, shortness of breath, wheezing, or difficulty breathing  · Coughing up blood  · Inability to swallow due to throat pain  Date Last Reviewed: 9/13/2015  © 6584-3571 [employer]. [address] [place] [address]. All rights reserved. This information is not intended as a substitute for professional medical care. Always follow your healthcare professional's instructions.

## 2024-05-08 ENCOUNTER — LAB VISIT (OUTPATIENT)
Dept: LAB | Facility: HOSPITAL | Age: 64
End: 2024-05-08
Attending: OTOLARYNGOLOGY
Payer: COMMERCIAL

## 2024-05-08 ENCOUNTER — TELEPHONE (OUTPATIENT)
Dept: INTERNAL MEDICINE | Facility: CLINIC | Age: 64
End: 2024-05-08
Payer: COMMERCIAL

## 2024-05-08 DIAGNOSIS — J34.2 DEVIATED NASAL SEPTUM: ICD-10-CM

## 2024-05-08 DIAGNOSIS — I15.2 HYPERTENSION ASSOCIATED WITH DIABETES: ICD-10-CM

## 2024-05-08 DIAGNOSIS — E10.65 TYPE 1 DIABETES MELLITUS WITH HYPERGLYCEMIA: Primary | ICD-10-CM

## 2024-05-08 DIAGNOSIS — E11.59 HYPERTENSION ASSOCIATED WITH DIABETES: ICD-10-CM

## 2024-05-08 DIAGNOSIS — G47.33 OBSTRUCTIVE SLEEP APNEA (ADULT) (PEDIATRIC): ICD-10-CM

## 2024-05-08 DIAGNOSIS — E03.9 HYPOTHYROIDISM, UNSPECIFIED TYPE: ICD-10-CM

## 2024-05-08 LAB — FERRITIN SERPL-MCNC: 141 NG/ML (ref 20–300)

## 2024-05-08 PROCEDURE — 36415 COLL VENOUS BLD VENIPUNCTURE: CPT | Mod: PN | Performed by: OTOLARYNGOLOGY

## 2024-05-08 PROCEDURE — 82728 ASSAY OF FERRITIN: CPT | Performed by: OTOLARYNGOLOGY

## 2024-06-03 RX ORDER — ROPINIROLE 1 MG/1
1 TABLET, FILM COATED ORAL 2 TIMES DAILY
Qty: 180 TABLET | Refills: 2 | Status: SHIPPED | OUTPATIENT
Start: 2024-06-03 | End: 2024-06-05

## 2024-06-05 RX ORDER — ROPINIROLE 1 MG/1
1 TABLET, FILM COATED ORAL 2 TIMES DAILY
Qty: 180 TABLET | Refills: 2 | Status: SHIPPED | OUTPATIENT
Start: 2024-06-05 | End: 2024-06-13

## 2024-06-07 ENCOUNTER — LAB VISIT (OUTPATIENT)
Dept: LAB | Facility: HOSPITAL | Age: 64
End: 2024-06-07

## 2024-06-07 DIAGNOSIS — I15.2 HYPERTENSION ASSOCIATED WITH DIABETES: ICD-10-CM

## 2024-06-07 DIAGNOSIS — E11.59 HYPERTENSION ASSOCIATED WITH DIABETES: ICD-10-CM

## 2024-06-07 DIAGNOSIS — E10.65 TYPE 1 DIABETES MELLITUS WITH HYPERGLYCEMIA: ICD-10-CM

## 2024-06-07 LAB
ALBUMIN/CREAT UR: 5.1 UG/MG (ref 0–30)
CREAT UR-MCNC: 98 MG/DL (ref 23–375)
MICROALBUMIN UR DL<=1MG/L-MCNC: 5 UG/ML

## 2024-06-07 PROCEDURE — 82043 UR ALBUMIN QUANTITATIVE: CPT | Performed by: NURSE PRACTITIONER

## 2024-06-09 ENCOUNTER — PATIENT MESSAGE (OUTPATIENT)
Dept: INTERNAL MEDICINE | Facility: CLINIC | Age: 64
End: 2024-06-09

## 2024-06-12 NOTE — PROGRESS NOTES
CC: Patient is here for management of Type 1 diabetes and review of medical conditions as listed in visit diagnosis.      HPI: Mr. Owen Radford is a 63 y.o. White male who was diagnosed with SUSAN/Type 1  DM x >25 years ago, seen by Dr. Sheffield in 2016.    Last seen by me in 2/2023.   Being seen by me again today.  Had switch from medtronic to tandem control iq   Deals with restless leg- requip -stopped r/t ses  Gabapentin in the past.  Mood swings noted with requip.    Mn 0.8 u/hr  0600 1.75 u/hr  12n 2.15 u/hr   3p 1.8 u/hr   7p 1.75 u/hr   ICR 1 to 10 whole day   1: 40, 1:40, 1:45, 1:48, 1:45   Target 110 mg/dl   Iob 4 hours with control iq  Last visit virtual   Being seen in person today.     Avg 140 mg/dl  TIR 82%   Sd 42 mg/dl  Co efficient of variation 30%  Gmi 6.7%  Tdd 48.07 units   Carbs /avg pe rday 98 gm   Highest 234 mg/dl  Lowest 70, 76 mg/dl      Data via [employer] source limited.  Reaching out to [employer].  A1c improved 7.7% to 6.8%       On pump, injections 4 x a day  Testing 4 times a day  Type 1 demonstrated an understanding of technology and motivated to use the device correctly. Patients are expected to adhere to a diabetes treatment plan and are capable of recognizing alerts and alarms.    Patient has  impaired awareness of hypoglycemia and puts patient at risk     Lab Results   Component Value Date    HGBA1C 6.8 (H) 06/07/2024     Has h/o severe hypoglycemia, 40 -50 mg/dl  H/o nocturnal hypoglycemia  Has hypoglycemia unawareness     Has h/o hypothyroidism, last tsh, t4 free  Lab Results   Component Value Date    TSH 4.487 (H) 06/07/2024   on lt4 200 mcg daily -not always taking without other medications  Holding off lt4 25 mcg additional    CURRENT DIABETIC MEDS: novolog w/ insulin pump   Control iq iob 5 hours, target 110  See above    Diabetes Management Status    Statin: Taking  ACE/ARB: Taking    Screening or Prevention Patient's value Goal Complete/Controlled?   HgA1C Testing and Control   Lab Results  "  Component Value Date    HGBA1C 6.8 (H) 06/07/2024      Annually/Less than 8% Yes   Lipid profile : 06/07/2024 Annually Yes   LDL control Lab Results   Component Value Date    LDLCALC 90.4 06/07/2024    Annually/Less than 100 mg/dl  Yes   Nephropathy screening Lab Results   Component Value Date    LABMICR 5.0 06/07/2024     No results found for: "PROTEINUA" Annually Yes   Blood pressure BP Readings from Last 1 Encounters:   06/13/24 (!) 140/78    Less than 140/90 Yes   Dilated retinal exam : 06/01/2022 Annually No   Foot exam   : 08/10/2022 Annually Yes   DR-annually April   [facility]     REVIEW OF SYSTEMS  General: no weakness, fatigue, +weight fluctuations 1-10#   Eyes: no double or blurred vision, eye pain, or redness   Cardiovascular: no chest pain, palpitations, edema, or murmurs.   Respiratory: no cough or dyspnea.   GI: no heartburn, nausea, or changes in bowel patterns; good appetite.   Skin: no rashes, dryness, itching, or reactions at insulin injection sites.   Neuro: no numbness, tingling, tremors, or vertigo. +restless leg syndrome-worsen  Endocrine: no polyuria, polydipsia, polyphagia, heat or cold intolerance.   Sleep cycle is off, uses cpap    Vital Signs  BP (!) 154/78   Pulse 74   Ht 5' 10" (1.778 m)   Wt 89.9 kg (198 lb 3.1 oz)   SpO2 98%   BMI 28.44 kg/m²     Hemoglobin A1C   Date Value Ref Range Status   06/07/2024 6.8 (H) 4.0 - 5.6 % Final     Comment:     ADA Screening Guidelines:  5.7-6.4%  Consistent with prediabetes  >or=6.5%  Consistent with diabetes    High levels of fetal hemoglobin interfere with the HbA1C  assay. Heterozygous hemoglobin variants (HbS, HgC, etc)do  not significantly interfere with this assay.   However, presence of multiple variants may affect accuracy.     12/15/2023 7.7 (H) 4.0 - 5.6 % Final     Comment:     ADA Screening Guidelines:  5.7-6.4%  Consistent with prediabetes  >or=6.5%  Consistent with diabetes    High levels of fetal hemoglobin " interfere with the HbA1C  assay. Heterozygous hemoglobin variants (HbS, HgC, etc)do  not significantly interfere with this assay.   However, presence of multiple variants may affect accuracy.     07/27/2023 6.8 (H) 4.0 - 5.6 % Final     Comment:     ADA Screening Guidelines:  5.7-6.4%  Consistent with prediabetes  >or=6.5%  Consistent with diabetes    High levels of fetal hemoglobin interfere with the HbA1C  assay. Heterozygous hemoglobin variants (HbS, HgC, etc)do  not significantly interfere with this assay.   However, presence of multiple variants may affect accuracy.        Lab Results   Component Value Date    CREATININE 1.0 06/07/2024      Lab Results   Component Value Date    TSH 4.487 (H) 06/07/2024      Lab Results   Component Value Date    LDLCALC 90.4 06/07/2024        PHYSICAL EXAMINATION  Constitutional:  Well developed, well nourished, NAD.  ENT: External ears no masses with nose patent; normal hearing.   Neck:  Supple; trachea midline; no thyromegaly.   Cardiovascular: Normal heart sounds, no LE edema.     Lungs:  Normal effort; lungs anterior bilaterally clear to auscultation.  Abdomen:  Soft, no masses,  no hernias.  MS: No clubbing or cyanosis of nails noted; normal gait   Skin: No rashes, lesions, or ulcers; no nodules.  Psychiatric: Good judgement and insight; normal mood      Assessment/Plan  1. Type 1 diabetes mellitus with hyperglycemia  Hemoglobin A1C      2. Mixed diabetic hyperlipidemia associated with type 1 diabetes mellitus        3. Type 1 diabetes mellitus with background diabetic retinopathy        4. Obstructive sleep apnea        5. Hypothyroidism, unspecified type  TSH      6. Hypoglycemia associated with diabetes        7. Insulin pump fitting or adjustment        8. Hypertension associated with diabetes  lisinopriL 10 MG tablet      9. Type 1 diabetes mellitus with chronic painful diabetic neuropathy  Ambulatory referral/consult to Pain Clinic      Follow up in 5 months w/ me ,  A1c, tsh in 5 months, upload with [place] for junito, needs new pcp, recent passing of Dr. Suarez  2.  On statin  3.  F/u with retinal specialist  4.  Using cpap consistently, switching to bipap , study done in 2024  5.   Lab Results   Component Value Date    TSH 4.487 (H) 06/07/2024     On lt4   Discussed taking it properly for absorption on l-t4  6. Has baqsimi  7.  No changes today other then isf 45 at 3p   8.  Bp elevated, on acei   9. Pain management , podiatry    FOLLOW UP  In 5 -6 months      Orders Placed This Encounter   Procedures    Hemoglobin A1C     Standing Status:   Future     Standing Expiration Date:   8/11/2025    TSH     Standing Status:   Future     Standing Expiration Date:   8/11/2025    Ambulatory referral/consult to Pain Clinic     Standing Status:   Future     Standing Expiration Date:   7/13/2025     Referral Priority:   Routine     Referral Type:   Consultation     Referral Reason:   Specialty Services Required     Requested Specialty:   Pain Medicine     Number of Visits Requested:   1    Ambulatory referral/consult to Podiatry     Standing Status:   Future     Standing Expiration Date:   7/13/2025     Referral Priority:   Routine     Referral Type:   Consultation     Referral Reason:   Specialty Services Required     Requested Specialty:   Podiatry     Number of Visits Requested:   1

## 2024-06-13 ENCOUNTER — TELEPHONE (OUTPATIENT)
Dept: INTERNAL MEDICINE | Facility: CLINIC | Age: 64
End: 2024-06-13

## 2024-06-13 ENCOUNTER — OFFICE VISIT (OUTPATIENT)
Dept: INTERNAL MEDICINE | Facility: CLINIC | Age: 64
End: 2024-06-13
Payer: COMMERCIAL

## 2024-06-13 VITALS
OXYGEN SATURATION: 98 % | WEIGHT: 198.19 LBS | BODY MASS INDEX: 28.37 KG/M2 | HEART RATE: 74 BPM | SYSTOLIC BLOOD PRESSURE: 140 MMHG | HEIGHT: 70 IN | DIASTOLIC BLOOD PRESSURE: 78 MMHG

## 2024-06-13 DIAGNOSIS — G47.33 OBSTRUCTIVE SLEEP APNEA: ICD-10-CM

## 2024-06-13 DIAGNOSIS — E10.40: ICD-10-CM

## 2024-06-13 DIAGNOSIS — I15.2 HYPERTENSION ASSOCIATED WITH DIABETES: ICD-10-CM

## 2024-06-13 DIAGNOSIS — E11.59 HYPERTENSION ASSOCIATED WITH DIABETES: ICD-10-CM

## 2024-06-13 DIAGNOSIS — Z46.81 INSULIN PUMP FITTING OR ADJUSTMENT: ICD-10-CM

## 2024-06-13 DIAGNOSIS — E11.649 HYPOGLYCEMIA ASSOCIATED WITH DIABETES: ICD-10-CM

## 2024-06-13 DIAGNOSIS — E03.9 HYPOTHYROIDISM, UNSPECIFIED TYPE: ICD-10-CM

## 2024-06-13 DIAGNOSIS — E10.69 MIXED DIABETIC HYPERLIPIDEMIA ASSOCIATED WITH TYPE 1 DIABETES MELLITUS: ICD-10-CM

## 2024-06-13 DIAGNOSIS — E10.3299 TYPE 1 DIABETES MELLITUS WITH BACKGROUND DIABETIC RETINOPATHY: ICD-10-CM

## 2024-06-13 DIAGNOSIS — E78.2 MIXED DIABETIC HYPERLIPIDEMIA ASSOCIATED WITH TYPE 1 DIABETES MELLITUS: ICD-10-CM

## 2024-06-13 DIAGNOSIS — E10.65 TYPE 1 DIABETES MELLITUS WITH HYPERGLYCEMIA: Primary | ICD-10-CM

## 2024-06-13 PROCEDURE — 99999 PR PBB SHADOW E&M-EST. PATIENT-LVL V: CPT | Mod: PBBFAC,,, | Performed by: NURSE PRACTITIONER

## 2024-06-13 PROCEDURE — 99214 OFFICE O/P EST MOD 30 MIN: CPT | Mod: S$GLB,,, | Performed by: NURSE PRACTITIONER

## 2024-06-13 PROCEDURE — 95251 CONT GLUC MNTR ANALYSIS I&R: CPT | Mod: S$GLB,,, | Performed by: NURSE PRACTITIONER

## 2024-06-13 RX ORDER — ROPINIROLE 1 MG/1
1 TABLET, FILM COATED ORAL 2 TIMES DAILY
Start: 2024-06-13

## 2024-06-13 RX ORDER — LISINOPRIL 10 MG/1
10 TABLET ORAL DAILY
Qty: 90 TABLET | Refills: 3 | Status: SHIPPED | OUTPATIENT
Start: 2024-06-13

## 2024-06-13 NOTE — TELEPHONE ENCOUNTER
----- Message from MAGDA Bradley, YOUSIF sent at 6/13/2024  1:02 PM CDT -----  Regarding: referrals  F/u for pain management and podiatry  Referrals are in  Thanks  Ninfa

## 2024-06-13 NOTE — PATIENT INSTRUCTIONS
Follow up in 5-6 months w/Irielle   A1c tsh prior -5 months   Pain management clinic 2024  Podiatry 2024     Lab Results   Component Value Date    HGBA1C 6.8 (H) 06/07/2024     Goal less than 7%    [email or web address]  Eat fit wil  Myfitnesspal wil  [email or web address]    mySugr wil     Goal  no higher than 200     Tandem x2   Dexcom g6

## 2024-07-16 RX ORDER — INSULIN LISPRO 100 [IU]/ML
INJECTION, SOLUTION INTRAVENOUS; SUBCUTANEOUS
Qty: 90 ML | Refills: 3 | Status: SHIPPED | OUTPATIENT
Start: 2024-07-16

## 2024-08-09 RX ORDER — BLOOD-GLUCOSE SENSOR
EACH MISCELLANEOUS
Qty: 9 EACH | Refills: 3 | Status: SHIPPED | OUTPATIENT
Start: 2024-08-09

## 2024-09-04 RX ORDER — GLUCAGON 3 MG/1
POWDER NASAL
Qty: 2 EACH | Refills: 3 | Status: SHIPPED | OUTPATIENT
Start: 2024-09-04

## 2024-10-26 RX ORDER — INSULIN LISPRO 100 [IU]/ML
INJECTION, SOLUTION INTRAVENOUS; SUBCUTANEOUS
Qty: 90 ML | Refills: 3 | Status: SHIPPED | OUTPATIENT
Start: 2024-10-26

## 2024-11-13 ENCOUNTER — OFFICE VISIT (OUTPATIENT)
Dept: INTERNAL MEDICINE | Facility: CLINIC | Age: 64
End: 2024-11-13
Payer: COMMERCIAL

## 2024-11-13 ENCOUNTER — LAB VISIT (OUTPATIENT)
Dept: LAB | Facility: HOSPITAL | Age: 64
End: 2024-11-13
Payer: COMMERCIAL

## 2024-11-13 ENCOUNTER — PATIENT MESSAGE (OUTPATIENT)
Dept: INTERNAL MEDICINE | Facility: CLINIC | Age: 64
End: 2024-11-13

## 2024-11-13 DIAGNOSIS — E11.649 HYPOGLYCEMIA ASSOCIATED WITH DIABETES: ICD-10-CM

## 2024-11-13 DIAGNOSIS — E03.9 HYPOTHYROIDISM, UNSPECIFIED TYPE: ICD-10-CM

## 2024-11-13 DIAGNOSIS — Z46.81 INSULIN PUMP FITTING OR ADJUSTMENT: ICD-10-CM

## 2024-11-13 DIAGNOSIS — R12 HEARTBURN: ICD-10-CM

## 2024-11-13 DIAGNOSIS — E10.65 TYPE 1 DIABETES MELLITUS WITH HYPERGLYCEMIA: ICD-10-CM

## 2024-11-13 DIAGNOSIS — I15.2 HYPERTENSION ASSOCIATED WITH DIABETES: ICD-10-CM

## 2024-11-13 DIAGNOSIS — H35.00 RETINOPATHY: ICD-10-CM

## 2024-11-13 DIAGNOSIS — E11.59 HYPERTENSION ASSOCIATED WITH DIABETES: ICD-10-CM

## 2024-11-13 DIAGNOSIS — E10.65 TYPE 1 DIABETES MELLITUS WITH HYPERGLYCEMIA: Primary | ICD-10-CM

## 2024-11-13 DIAGNOSIS — G47.33 OBSTRUCTIVE SLEEP APNEA: ICD-10-CM

## 2024-11-13 LAB
ESTIMATED AVG GLUCOSE: 148 MG/DL (ref 68–131)
HBA1C MFR BLD: 6.8 % (ref 4–5.6)
T4 FREE SERPL-MCNC: 1.21 NG/DL (ref 0.71–1.51)
TSH SERPL DL<=0.005 MIU/L-ACNC: 0.12 UIU/ML (ref 0.4–4)

## 2024-11-13 PROCEDURE — 99442 PR PHYSICIAN TELEPHONE EVALUATION 11-20 MIN: CPT | Mod: 95,,, | Performed by: NURSE PRACTITIONER

## 2024-11-13 PROCEDURE — 36415 COLL VENOUS BLD VENIPUNCTURE: CPT | Performed by: NURSE PRACTITIONER

## 2024-11-13 PROCEDURE — 95251 CONT GLUC MNTR ANALYSIS I&R: CPT | Mod: NDTC,,, | Performed by: NURSE PRACTITIONER

## 2024-11-13 PROCEDURE — 84439 ASSAY OF FREE THYROXINE: CPT | Performed by: NURSE PRACTITIONER

## 2024-11-13 PROCEDURE — 83036 HEMOGLOBIN GLYCOSYLATED A1C: CPT | Performed by: NURSE PRACTITIONER

## 2024-11-13 PROCEDURE — 84443 ASSAY THYROID STIM HORMONE: CPT | Performed by: NURSE PRACTITIONER

## 2024-11-14 DIAGNOSIS — E10.65 TYPE 1 DIABETES MELLITUS WITH HYPERGLYCEMIA: Primary | ICD-10-CM

## 2024-11-14 DIAGNOSIS — E03.9 HYPOTHYROIDISM, UNSPECIFIED TYPE: ICD-10-CM

## 2024-11-14 RX ORDER — OMEPRAZOLE 40 MG/1
40 CAPSULE, DELAYED RELEASE ORAL DAILY
Qty: 90 CAPSULE | Refills: 3 | Status: SHIPPED | OUTPATIENT
Start: 2024-11-14 | End: 2025-11-14

## 2024-11-15 ENCOUNTER — TELEPHONE (OUTPATIENT)
Dept: INTERNAL MEDICINE | Facility: CLINIC | Age: 64
End: 2024-11-15
Payer: COMMERCIAL

## 2024-11-17 RX ORDER — OMEPRAZOLE 20 MG/1
20 CAPSULE, DELAYED RELEASE ORAL DAILY
Qty: 90 CAPSULE | Refills: 3 | Status: SHIPPED | OUTPATIENT
Start: 2024-11-17 | End: 2024-11-19

## 2024-11-18 RX ORDER — SIMVASTATIN 40 MG/1
TABLET, FILM COATED ORAL
Qty: 90 TABLET | Refills: 1 | Status: SHIPPED | OUTPATIENT
Start: 2024-11-18

## 2024-11-19 ENCOUNTER — TELEPHONE (OUTPATIENT)
Dept: INTERNAL MEDICINE | Facility: CLINIC | Age: 64
End: 2024-11-19
Payer: COMMERCIAL

## 2024-11-19 RX ORDER — PANTOPRAZOLE SODIUM 20 MG/1
20 TABLET, DELAYED RELEASE ORAL DAILY
Qty: 90 TABLET | Refills: 3 | Status: SHIPPED | OUTPATIENT
Start: 2024-11-19 | End: 2025-11-19

## 2024-11-19 NOTE — TELEPHONE ENCOUNTER
I called his insurance about coverage on Omeprazole. This drug is excluded from his plan. They will not cover.   Also found out the his address and phone number is incorrect in his chart so we had a difficult time finding him with his insurance company. The correct address with his insurance is [address] [place] [address]. Phone # [phone]. I would suggest that he provide the correct information to [facility].

## 2024-11-20 ENCOUNTER — TELEPHONE (OUTPATIENT)
Dept: INTERNAL MEDICINE | Facility: CLINIC | Age: 64
End: 2024-11-20
Payer: COMMERCIAL

## 2024-11-20 ENCOUNTER — PATIENT MESSAGE (OUTPATIENT)
Dept: INTERNAL MEDICINE | Facility: CLINIC | Age: 64
End: 2024-11-20
Payer: COMMERCIAL

## 2024-11-20 NOTE — TELEPHONE ENCOUNTER
Mrs Shelby I called his insurance because the new Gerd medication that you called in yesterday is also needing a PA and is excluded from his plan. I was informed that the patient knows that his medications will be rejected by the type of plan that he is on so there is a plan called RX Results Assistant Program to help with the cost of his medication. The patient will have to call [phone] to set up a cost /plan.

## 2024-11-30 PROBLEM — R12 HEARTBURN: Status: ACTIVE | Noted: 2024-11-30

## 2024-12-01 NOTE — PROGRESS NOTES
CC: Patient is here for management of Type 1 diabetes and review of medical conditions as listed in visit diagnosis.      HPI: Mr. Owen Radford is a 64 y.o. White male who was diagnosed with SUSAN/Type 1  DM x >25 years ago, seen by Dr. Sheffield in 2016.    Last seen by me in spring 2024.  Being seen by me again today.  Pt did not realize visit was not in person, opted for audio.  Did labs after visit.  Lab Results   Component Value Date    HGBA1C 6.8 (H) 11/13/2024     Lab Results   Component Value Date    TSH 0.124 (L) 11/13/2024         The patient location is: [facility]  The chief complaint leading to consultation is: type 1 dm    Visit type: audio only    Face to Face time with patient: 15   minutes of total time spent on the encounter, which includes face to face time and non-face to face time preparing to see the patient (eg, review of tests), Obtaining and/or reviewing separately obtained history, Documenting clinical information in the electronic or other health record, Independently interpreting results (not separately reported) and communicating results to the patient/family/caregiver, or Care coordination (not separately reported).         Each patient to whom he or she provides medical services by telemedicine is:  (1) informed of the relationship between the physician and patient and the respective role of any other health care provider with respect to management of the patient; and (2) notified that he or she may decline to receive medical services by telemedicine and may withdraw from such care at any time.    Notes:    Had switch from medtronic to [email or web address] control iq   Deals with restless leg- requip -stopped r/t ses  Gabapentin in the past.  Mood swings noted with requip.    Tandem-connected for review  See media  Highest 220 mg/dl  Lowest 60 mg/dl  Avg 136 mg/dl   Tdd 46.18  units  Carbs 86 gm/day  Sd 37 mg/dl  TIR 87%   C/o heartburn   Loss 20#   Dealing with afternoon lows around 3p      On pump, bolus 4 x  "a day  Testing 4 times a day  Type 1 demonstrated an understanding of technology and motivated to use the device correctly. Patients are expected to adhere to a diabetes treatment plan and are capable of recognizing alerts and alarms.    Patient has  impaired awareness of hypoglycemia and puts patient at risk     Lab Results   Component Value Date    HGBA1C 6.8 (H) 11/13/2024     Has h/o severe hypoglycemia, 40 -50 mg/dl  H/o nocturnal hypoglycemia  Has hypoglycemia unawareness     Has h/o hypothyroidism, last tsh, t4 free  Lab Results   Component Value Date    TSH 0.124 (L) 11/13/2024   on lt4 200 mcg daily   Holding off lt4 25 mcg additional    CURRENT DIABETIC MEDS: novolog w/ insulin pump   Control iq iob 5 hours, target 110  See above    Diabetes Management Status    Statin: Taking  ACE/ARB: Taking    Screening or Prevention Patient's value Goal Complete/Controlled?   HgA1C Testing and Control   Lab Results   Component Value Date    HGBA1C 6.8 (H) 11/13/2024      Annually/Less than 8% Yes   Lipid profile : 06/07/2024 Annually Yes   LDL control Lab Results   Component Value Date    LDLCALC 90.4 06/07/2024    Annually/Less than 100 mg/dl  Yes   Nephropathy screening Lab Results   Component Value Date    LABMICR 5.0 06/07/2024     No results found for: "PROTEINUA" Annually Yes   Blood pressure BP Readings from Last 1 Encounters:   06/13/24 (!) 140/78    Less than 140/90 Yes   Dilated retinal exam : 06/01/2022 Annually No   Foot exam   : 08/10/2022 Annually Yes   DR-annually April   [facility]     REVIEW OF SYSTEMS  General: no weakness, fatigue, +weight loss 20#  Eyes: no double or blurred vision, eye pain, or redness   Cardiovascular: no chest pain, palpitations, edema, or murmurs.   Respiratory: no cough or dyspnea.   GI: + heartburn, nausea, or changes in bowel patterns; good appetite.   Skin: no rashes, dryness, itching, or reactions at insulin injection sites.   Neuro: no numbness, tingling, " tremors, or vertigo. +restless leg syndrome-worsen  Endocrine: no polyuria, polydipsia, polyphagia, heat or cold intolerance.   Sleep cycle is off, uses cpap    Vital Signs  There were no vitals taken for this visit.    Hemoglobin A1C   Date Value Ref Range Status   11/13/2024 6.8 (H) 4.0 - 5.6 % Final     Comment:     ADA Screening Guidelines:  5.7-6.4%  Consistent with prediabetes  >or=6.5%  Consistent with diabetes    High levels of fetal hemoglobin interfere with the HbA1C  assay. Heterozygous hemoglobin variants (HbS, HgC, etc)do  not significantly interfere with this assay.   However, presence of multiple variants may affect accuracy.     06/07/2024 6.8 (H) 4.0 - 5.6 % Final     Comment:     ADA Screening Guidelines:  5.7-6.4%  Consistent with prediabetes  >or=6.5%  Consistent with diabetes    High levels of fetal hemoglobin interfere with the HbA1C  assay. Heterozygous hemoglobin variants (HbS, HgC, etc)do  not significantly interfere with this assay.   However, presence of multiple variants may affect accuracy.     12/15/2023 7.7 (H) 4.0 - 5.6 % Final     Comment:     ADA Screening Guidelines:  5.7-6.4%  Consistent with prediabetes  >or=6.5%  Consistent with diabetes    High levels of fetal hemoglobin interfere with the HbA1C  assay. Heterozygous hemoglobin variants (HbS, HgC, etc)do  not significantly interfere with this assay.   However, presence of multiple variants may affect accuracy.        Lab Results   Component Value Date    CREATININE 1.0 06/07/2024      Lab Results   Component Value Date    TSH 0.124 (L) 11/13/2024      Lab Results   Component Value Date    LDLCALC 90.4 06/07/2024        PHYSICAL EXAMINATION  deferred    Assessment/Plan  1. Type 1 diabetes mellitus with hyperglycemia  Hemoglobin A1C    Hemoglobin A1C    Microalbumin/Creatinine Ratio, Urine    Hemoglobin A1C    Microalbumin/Creatinine Ratio, Urine  Labs today  Other labs next time  Changes 3p basal 1.65 u/hr, isf 40 mn-mn, ICR 1 to  12 mn-mn       2. Hypothyroidism, unspecified type  TSH-repeat in 6 weeks    TSH      3. Hypoglycemia associated with diabetes  Using dexcom, control and basal iq features      4. Hypertension associated with diabetes  Microalbumin/Creatinine Ratio, Urine, on arb/acei, stable   5.  Insulin pump fitting and adjustment See above   6.  Retinopathy F/u with retinal specialist   7.  Heartburn Send prilosec,pa pending, tums is not working       FOLLOW UP  In 5  months      Orders Placed This Encounter   Procedures    Hemoglobin A1C     Standing Status:   Future     Number of Occurrences:   1     Standing Expiration Date:   1/12/2026     Order Specific Question:   Send normal result to authorizing provider's In Basket if patient is active on MyChart:     Answer:   Yes    TSH     Standing Status:   Future     Number of Occurrences:   1     Standing Expiration Date:   1/12/2026     Order Specific Question:   Send normal result to authorizing provider's In Basket if patient is active on MyChart:     Answer:   Yes    Hemoglobin A1C     Standing Status:   Future     Standing Expiration Date:   1/12/2026     Order Specific Question:   Send normal result to authorizing provider's In Basket if patient is active on MyChart:     Answer:   Yes    Microalbumin/Creatinine Ratio, Urine     Standing Status:   Future     Standing Expiration Date:   1/12/2026     Order Specific Question:   Specimen Source     Answer:   Urine    Hemoglobin A1C     Standing Status:   Future     Standing Expiration Date:   1/18/2026     Order Specific Question:   Send normal result to authorizing provider's In Basket if patient is active on MyChart:     Answer:   Yes    Microalbumin/Creatinine Ratio, Urine     Standing Status:   Future     Standing Expiration Date:   1/18/2026     Order Specific Question:   Specimen Source     Answer:   Urine    TSH     Standing Status:   Future     Standing Expiration Date:   1/18/2026     Order Specific Question:   Send  normal result to authorizing provider's In Basket if patient is active on MyChart:     Answer:   Yes           Answers submitted by the patient for this visit:  Diabetes Questionnaire (Submitted on 11/13/2024)  Chief Complaint: Diabetes problem  Diabetes type: type 1  MedicAlert ID: No  Disease duration: 25 Years  blurred vision: Yes  chest pain: No  fatigue: No  foot paresthesias: No  foot ulcerations: No  polydipsia: No  polyphagia: No  polyuria: No  visual change: Yes  weakness: No  weight loss: Yes  Symptom course: stable  confusion: No  speech difficulty: No  dizziness: No  nervous/anxious: No  headaches: No  hunger: No  mood changes: No  pallor: No  seizures: No  tremors: No  sleepiness: No  sweats: No  blackouts: No  hospitalization: No  nocturnal hypoglycemia: No  required assistance: No  required glucagon: No  CVA: No  heart disease: No  impotence: Yes  nephropathy: No  peripheral neuropathy: No  PVD: No  retinopathy: Yes  autonomic neuropathy: No  CAD risks: no known risk factors  Current treatments: insulin pump  Treatment compliance: all of the time  Given by: patient  Injection sites: buttocks  Monitoring compliance: adequate

## 2025-03-15 RX ORDER — LEVOTHYROXINE SODIUM 200 UG/1
200 TABLET ORAL
Qty: 90 TABLET | Refills: 3 | Status: SHIPPED | OUTPATIENT
Start: 2025-03-15

## 2025-04-03 ENCOUNTER — APPOINTMENT (OUTPATIENT)
Dept: LAB | Facility: HOSPITAL | Age: 65
End: 2025-04-03
Attending: NURSE PRACTITIONER
Payer: COMMERCIAL

## 2025-04-03 ENCOUNTER — PATIENT MESSAGE (OUTPATIENT)
Dept: INTERNAL MEDICINE | Facility: CLINIC | Age: 65
End: 2025-04-03
Payer: COMMERCIAL

## 2025-04-09 ENCOUNTER — OFFICE VISIT (OUTPATIENT)
Dept: INTERNAL MEDICINE | Facility: CLINIC | Age: 65
End: 2025-04-09
Payer: COMMERCIAL

## 2025-04-09 DIAGNOSIS — H35.00 RETINOPATHY: ICD-10-CM

## 2025-04-09 DIAGNOSIS — G47.33 OBSTRUCTIVE SLEEP APNEA: ICD-10-CM

## 2025-04-09 DIAGNOSIS — Z95.1 S/P CABG X 3: ICD-10-CM

## 2025-04-09 DIAGNOSIS — I15.2 HYPERTENSION ASSOCIATED WITH DIABETES: ICD-10-CM

## 2025-04-09 DIAGNOSIS — E03.9 HYPOTHYROIDISM, UNSPECIFIED TYPE: ICD-10-CM

## 2025-04-09 DIAGNOSIS — E10.40: Primary | ICD-10-CM

## 2025-04-09 DIAGNOSIS — E10.69 MIXED DIABETIC HYPERLIPIDEMIA ASSOCIATED WITH TYPE 1 DIABETES MELLITUS: ICD-10-CM

## 2025-04-09 DIAGNOSIS — Z46.81 INSULIN PUMP FITTING OR ADJUSTMENT: ICD-10-CM

## 2025-04-09 DIAGNOSIS — E11.59 HYPERTENSION ASSOCIATED WITH DIABETES: ICD-10-CM

## 2025-04-09 DIAGNOSIS — E10.65 TYPE 1 DIABETES MELLITUS WITH HYPERGLYCEMIA: ICD-10-CM

## 2025-04-09 DIAGNOSIS — E78.2 MIXED DIABETIC HYPERLIPIDEMIA ASSOCIATED WITH TYPE 1 DIABETES MELLITUS: ICD-10-CM

## 2025-05-02 PROBLEM — Z95.1 S/P CABG X 3: Status: ACTIVE | Noted: 2024-11-30

## 2025-05-02 NOTE — PROGRESS NOTES
CC: Patient is here for management of Type 1 diabetes and review of medical conditions as listed in visit diagnosis.      HPI: Mr. Owen Radford is a 64 y.o. White male who was diagnosed with SUSAN/Type 1  DM x >30 years ago, seen by Dr. Sheffield in 2016.  Has hypothyroidism, cardiac disease, cabg x 3 12/2024.  Last seen by me in fall 2024  Being seen by me again today.  Completed cardiac rehab. Reocovered well.   Doing well w/ tandem pump.  Control iq , 5 hours, target 110 mg/dl  Tdd 48.51 units  Total carbs 92 gm /day  Highs 305 mg/dl  Lows 41-73 mg/dl    Lab Results   Component Value Date    HGBA1C 5.8 (H) 04/03/2025     Lab Results   Component Value Date    TSH 0.441 04/03/2025         The patient location is: home  The chief complaint leading to consultation is: type 1 dm    Visit type: audio/visual    Face to Face time with patient: 20   minutes of total time spent on the encounter, which includes face to face time and non-face to face time preparing to see the patient (eg, review of tests), Obtaining and/or reviewing separately obtained history, Documenting clinical information in the electronic or other health record, Independently interpreting results (not separately reported) and communicating results to the patient/family/caregiver, or Care coordination (not separately reported).         Each patient to whom he or she provides medical services by telemedicine is:  (1) informed of the relationship between the physician and patient and the respective role of any other health care provider with respect to management of the patient; and (2) notified that he or she may decline to receive medical services by telemedicine and may withdraw from such care at any time.    Notes:    Had switch from medtronic to tandem control iq   Deals with restless leg- requip -stopped r/t ses  Gabapentin in the past.  Mood swings noted with requip.  On pump, bolus 4 x a day  Testing 4 times a day  Type 1 demonstrated an understanding  "of technology and motivated to use the device correctly. Patients are expected to adhere to a diabetes treatment plan and are capable of recognizing alerts and alarms.    Patient has  impaired awareness of hypoglycemia and puts patient at risk   Has h/o severe hypoglycemia, <40 -50 mg/dl  H/o nocturnal hypoglycemia  Has hypoglycemia unawareness     Has h/o hypothyroidism, last tsh, t4 free  on lt4 200 mcg daily   Holding off lt4 25 mcg additional    CURRENT DIABETIC MEDS: novolog w/ insulin pump   Control iq iob 5 hours, target 110  See above    Diabetes Management Status    Statin: Taking  ACE/ARB: Taking    Screening or Prevention Patient's value Goal Complete/Controlled?   HgA1C Testing and Control   Lab Results   Component Value Date    HGBA1C 5.8 (H) 04/03/2025      Annually/Less than 8% Yes   Lipid profile : 12/10/2024 Annually Yes   LDL control Lab Results   Component Value Date    LDLCALC 55 12/10/2024    Annually/Less than 100 mg/dl  Yes   Nephropathy screening Lab Results   Component Value Date    LABMICR 12.0 04/03/2025     No results found for: "PROTEINUA" Annually Yes   Blood pressure BP Readings from Last 1 Encounters:   06/13/24 (!) 140/78    Less than 140/90 Yes   Dilated retinal exam : 06/01/2022 Annually No   Foot exam   : 08/10/2022 Annually Yes   DR-annually April   [facility]     REVIEW OF SYSTEMS  General: no weakness, fatigue, +weight stable  Eyes: no double or blurred vision, eye pain, or redness   Cardiovascular: no chest pain, palpitations, edema, or murmurs.   Respiratory: no cough or dyspnea.   GI: no heartburn, nausea, or changes in bowel patterns; good appetite.   Skin: no rashes, dryness, itching, or reactions at insulin injection sites.   Neuro: no numbness, tingling, tremors, or vertigo. +restless leg syndrome-worsen  Endocrine: no polyuria, polydipsia, polyphagia, heat or cold intolerance.   Sleep cycle is off, uses cpap    Vital Signs  There were no vitals taken " for this visit.    Hemoglobin A1C   Date Value Ref Range Status   12/16/2024 6.8 (H) 4.7 - 5.6 % Final   12/09/2024 6.7 (H) 4.7 - 5.6 % Final   11/13/2024 6.8 (H) 4.0 - 5.6 % Final     Comment:     ADA Screening Guidelines:  5.7-6.4%  Consistent with prediabetes  >or=6.5%  Consistent with diabetes    High levels of fetal hemoglobin interfere with the HbA1C  assay. Heterozygous hemoglobin variants (HbS, HgC, etc)do  not significantly interfere with this assay.   However, presence of multiple variants may affect accuracy.     06/07/2024 6.8 (H) 4.0 - 5.6 % Final     Comment:     ADA Screening Guidelines:  5.7-6.4%  Consistent with prediabetes  >or=6.5%  Consistent with diabetes    High levels of fetal hemoglobin interfere with the HbA1C  assay. Heterozygous hemoglobin variants (HbS, HgC, etc)do  not significantly interfere with this assay.   However, presence of multiple variants may affect accuracy.     12/15/2023 7.7 (H) 4.0 - 5.6 % Final     Comment:     ADA Screening Guidelines:  5.7-6.4%  Consistent with prediabetes  >or=6.5%  Consistent with diabetes    High levels of fetal hemoglobin interfere with the HbA1C  assay. Heterozygous hemoglobin variants (HbS, HgC, etc)do  not significantly interfere with this assay.   However, presence of multiple variants may affect accuracy.       Hemoglobin A1c   Date Value Ref Range Status   04/03/2025 5.8 (H) 4.0 - 5.6 % Final     Comment:     ADA Screening Guidelines:  5.7-6.4%  Consistent with prediabetes  >=6.5%  Consistent with diabetes    High levels of fetal hemoglobin interfere with the HbA1C  assay. Heterozygous hemoglobin variants (HbS, HgC, etc)do  not significantly interfere with this assay.   However, presence of multiple variants may affect accuracy.      Lab Results   Component Value Date    CREATININE 0.99 01/24/2025      Lab Results   Component Value Date    TSH 0.441 04/03/2025      Lab Results   Component Value Date    LDLCALC 55 12/10/2024        PHYSICAL  EXAMINATION  deferred    Assessment/Plan  1. Type 1 diabetes mellitus with chronic painful diabetic neuropathy  Hemoglobin A1C next time  Changes  6a 35 isf, 8 icr   12n 35, 8  3p 35, 8   Continue other settings -see media   F/u in sept   Labs prior   Discussed dietary habits, stressors, routine  Has supplies  Has glucagon    Gmi 6.6 %   Co eff 38 %  Sd 53 mg/dl  Avg 138 mg/dl   F/u with podiatry prn      2. Type 1 diabetes mellitus with hyperglycemia  Pump therapy helps  Doing well with integration of dexcom w/ tandem  A1c looks great  Goal less than 7%   Tir > 70%      3. Retinopathy  F/u with retinal speciaist  stable      4. Mixed diabetic hyperlipidemia associated with type 1 diabetes mellitus  On statin  Goal less than 70       5. Obstructive sleep apnea  Uses cpap  If not consistent, can affect cardiovascular sytem, bg, metabolism      6. Insulin pump fitting or adjustment  See above for changes       7. Hypothyroidism, unspecified type  TSH    T4, Free  On l-t4  At goal  Lab Results   Component Value Date    TSH 0.441 04/03/2025             8. Hypertension associated with diabetes  Bp stable  On arb/acei  [place] [place]      9. S/P CABG x 3  F/u with cards  Did well w/ cardiac rehab   Stable         Answers submitted by the patient for this visit:  Diabetes Questionnaire (Submitted on 4/9/2025)  Chief Complaint: Diabetes problem  Diabetes type: type 1  MedicAlert ID: No  Disease duration: 30 Years  blurred vision: No  chest pain: No  fatigue: No  foot paresthesias: No  foot ulcerations: No  polydipsia: No  polyphagia: No  polyuria: Yes  visual change: No  weakness: No  Symptom course: stable  confusion: No  speech difficulty: No  dizziness: No  nervous/anxious: No  headaches: No  hunger: No  mood changes: Yes  pallor: No  seizures: No  tremors: No  sleepiness: No  sweats: No  blackouts: No  hospitalization: No  nocturnal hypoglycemia: Yes  required assistance: Yes  required glucagon:  No  CVA: Yes  heart disease: Yes  impotence: Yes  nephropathy: No  peripheral neuropathy: No  PVD: No  retinopathy: No  autonomic neuropathy: No  CAD risks: dyslipidemia  Current treatments: insulin pump  Treatment compliance: all of the time  Dose schedule: pre-breakfast, pre-lunch, pre-dinner, at bedtime  Given by: patient  Injection sites: abdominal wall, buttocks  Home blood tests: 5+ x per day  Home urines: <1 x per month  Monitoring compliance: adequate  Weight trend: stable  Current diet: generally healthy  Meal planning: none  Exercise: daily  Dietitian visit: Yes  Eye exam current: Yes  Sees podiatrist: Yes

## 2025-05-25 DIAGNOSIS — E78.2 MIXED DIABETIC HYPERLIPIDEMIA ASSOCIATED WITH TYPE 1 DIABETES MELLITUS: Primary | ICD-10-CM

## 2025-05-25 DIAGNOSIS — E10.69 MIXED DIABETIC HYPERLIPIDEMIA ASSOCIATED WITH TYPE 1 DIABETES MELLITUS: Primary | ICD-10-CM

## 2025-05-25 RX ORDER — SIMVASTATIN 40 MG/1
40 TABLET, FILM COATED ORAL NIGHTLY
Qty: 90 TABLET | Refills: 1 | Status: SHIPPED | OUTPATIENT
Start: 2025-05-25

## 2025-07-16 DIAGNOSIS — I15.2 HYPERTENSION ASSOCIATED WITH DIABETES: ICD-10-CM

## 2025-07-16 DIAGNOSIS — E11.59 HYPERTENSION ASSOCIATED WITH DIABETES: ICD-10-CM

## 2025-07-16 RX ORDER — LISINOPRIL 10 MG/1
10 TABLET ORAL
Qty: 90 TABLET | Refills: 3 | Status: SHIPPED | OUTPATIENT
Start: 2025-07-16

## 2025-07-16 NOTE — TELEPHONE ENCOUNTER
Refill Routing Note   Medication(s) are not appropriate for processing by [facility] for the following reason(s):        Responsible provider unclear    [facility] action(s):  Route             Appointments  past 12m or future 3m with PCP    Date Provider   Last Visit   4/9/2025 Ninfa Shelby APRN, FNP   Next Visit   9/3/2025 Ninfa Shelby APRN, FNP   ED visits in past 90 days: 0        Note composed:8:28 AM 07/16/2025

## (undated) DEVICE — DRAPE LAP T SHT W/ INSTR PAD

## (undated) DEVICE — SEE L#120831

## (undated) DEVICE — GLOVE BIOGEL SKINSENSE PI 6.5

## (undated) DEVICE — SUT VICRYL PLUS 3-0 18IN

## (undated) DEVICE — DRESSING TRANS 4X4 TEGADERM

## (undated) DEVICE — DISSECTOR LIGASURE EXACT 21CM

## (undated) DEVICE — DRESSING XEROFORM FOIL PK 1X8

## (undated) DEVICE — ELECTRODE REM PLYHSV RETURN 9

## (undated) DEVICE — GOWN SMART IMP BREATHABLE XXLG

## (undated) DEVICE — NDL SAFETY 22G X 1.5 ECLIPSE

## (undated) DEVICE — Device

## (undated) DEVICE — SUT MCRYL PLUS 4-0 PS2 27IN

## (undated) DEVICE — GLOVE BIOGEL SKINSENSE PI 8.0

## (undated) DEVICE — SYR B-D DISP CONTROL 10CC100/C

## (undated) DEVICE — SUT VICRYL PLUS 3-0 SH 18IN

## (undated) DEVICE — BLADE SURG STAINLESS STEEL #11

## (undated) DEVICE — NDL 18GA X1 1/2 REG BEVEL

## (undated) DEVICE — APPLICATOR CHLORAPREP ORN 26ML

## (undated) DEVICE — SOL PVP-I SCRUB 7.5% 4OZ

## (undated) DEVICE — SUT ETHIBOND 0 CR/CT-2 8-18

## (undated) DEVICE — GLOVE BIOGEL SKINSENSE PI 6.0

## (undated) DEVICE — SPONGE COTTON TRAY 4X4IN